# Patient Record
Sex: MALE | Race: ASIAN | URBAN - METROPOLITAN AREA
[De-identification: names, ages, dates, MRNs, and addresses within clinical notes are randomized per-mention and may not be internally consistent; named-entity substitution may affect disease eponyms.]

---

## 2018-06-21 ENCOUNTER — OFFICE VISIT (OUTPATIENT)
Dept: FAMILY MEDICINE CLINIC | Age: 73
End: 2018-06-21

## 2018-06-21 VITALS
RESPIRATION RATE: 18 BRPM | HEIGHT: 63 IN | OXYGEN SATURATION: 96 % | DIASTOLIC BLOOD PRESSURE: 82 MMHG | WEIGHT: 155 LBS | HEART RATE: 59 BPM | BODY MASS INDEX: 27.46 KG/M2 | SYSTOLIC BLOOD PRESSURE: 128 MMHG | TEMPERATURE: 98.1 F

## 2018-06-21 DIAGNOSIS — M19.90 ARTHRITIS: ICD-10-CM

## 2018-06-21 DIAGNOSIS — E78.00 HIGH CHOLESTEROL: ICD-10-CM

## 2018-06-21 DIAGNOSIS — K21.9 GASTROESOPHAGEAL REFLUX DISEASE WITHOUT ESOPHAGITIS: ICD-10-CM

## 2018-06-21 DIAGNOSIS — R56.9 SEIZURES (HCC): ICD-10-CM

## 2018-06-21 DIAGNOSIS — I10 ESSENTIAL HYPERTENSION: ICD-10-CM

## 2018-06-21 DIAGNOSIS — M10.00 IDIOPATHIC GOUT, UNSPECIFIED CHRONICITY, UNSPECIFIED SITE: Primary | ICD-10-CM

## 2018-06-21 RX ORDER — LOSARTAN POTASSIUM AND HYDROCHLOROTHIAZIDE 12.5; 1 MG/1; MG/1
1 TABLET ORAL DAILY
COMMUNITY
End: 2019-01-04 | Stop reason: SDUPTHER

## 2018-06-21 RX ORDER — SIMVASTATIN 40 MG/1
TABLET, FILM COATED ORAL
COMMUNITY
End: 2019-06-25 | Stop reason: SDUPTHER

## 2018-06-21 RX ORDER — ACETAMINOPHEN 500 MG
TABLET ORAL
COMMUNITY

## 2018-06-21 RX ORDER — LEVETIRACETAM 250 MG/1
250 TABLET ORAL 2 TIMES DAILY
COMMUNITY

## 2018-06-21 RX ORDER — NIFEDIPINE 60 MG/1
60 TABLET, EXTENDED RELEASE ORAL DAILY
COMMUNITY
End: 2019-01-04 | Stop reason: SDUPTHER

## 2018-06-21 RX ORDER — OMEPRAZOLE 20 MG/1
20 CAPSULE, DELAYED RELEASE ORAL DAILY
COMMUNITY
End: 2019-01-04 | Stop reason: SDUPTHER

## 2018-06-21 RX ORDER — ATENOLOL 100 MG/1
100 TABLET ORAL DAILY
COMMUNITY
End: 2019-06-25 | Stop reason: SDUPTHER

## 2018-06-21 RX ORDER — ALLOPURINOL 100 MG/1
TABLET ORAL DAILY
COMMUNITY
End: 2019-06-25 | Stop reason: SDUPTHER

## 2018-06-21 RX ORDER — NAPROXEN SODIUM 220 MG
220 TABLET ORAL 2 TIMES DAILY WITH MEALS
COMMUNITY
End: 2020-07-07

## 2018-06-21 NOTE — PROGRESS NOTES
Babar Em is a 74 y.o. male (: 1945) presenting to address:    Chief Complaint   Patient presents with   [address]    Hypertension    Joint Pain     bilateral knees       Vitals:    18 1330   BP: 128/82   Pulse: (!) 59   Resp: 18   Temp: 98.1 °F (36.7 °C)   TempSrc: Oral   SpO2: 96%   Weight: 155 lb (70.3 kg)   Height: 5' 3\" (1.6 m)   PainSc:   2   PainLoc: Knee       Hearing/Vision:   No exam data present    Learning Assessment:     Learning Assessment 2018   PRIMARY LEARNER Patient   PRIMARY LANGUAGE ENGLISH   LEARNER PREFERENCE PRIMARY VIDEOS     READING     LISTENING   ANSWERED BY patient   RELATIONSHIP SELF     Depression Screening:     PHQ over the last two weeks 2018   Little interest or pleasure in doing things Not at all   Feeling down, depressed or hopeless Not at all   Total Score PHQ 2 0     Fall Risk Assessment:     Fall Risk Assessment, last 12 mths 2018   Able to walk? Yes   Fall in past 12 months? No   Fall with injury? No     Abuse Screening:     Abuse Screening Questionnaire 2018   Do you ever feel afraid of your partner? N   Are you in a relationship with someone who physically or mentally threatens you? N   Is it safe for you to go home? Y     Coordination of Care Questionaire:   1. Have you been to the ER, urgent care clinic since your last visit? Hospitalized since your last visit? NO    2. Have you seen or consulted any other health care providers outside of the [address] [place] [address] since your last visit? Include any pap smears or colon screening.  NO

## 2018-06-21 NOTE — MR AVS SNAPSHOT
[address] [facility] [address] 
 
 
 [address] [facility] [address] [facility] [address] 
[phone] Patient: Compa Chavez MRN: [id number] QIV:[id number] Visit Information Date & Time Provider Department Dept. Phone Encounter #  
 6/21/2018  1:30 PM Jason Todd MD [employer] [phone] [id number] Follow-up Instructions Return if symptoms worsen or fail to improve. Your Appointments 8/22/2018  2:00 PM  
Follow Up with Jason Todd MD  
[employer] [address] [place] [address]) Appt Note: 2mth [address] [facility] [address] [facility] [address]  
[phone]  
  
   
 [address] [place] [id number] Upcoming Health Maintenance Date Due DTaP/Tdap/Td series (1 - Tdap) 2/20/1964 ZOSTER VACCINE AGE 60> 12/20/2002 GLAUCOMA SCREENING Q2Y 2/20/2008 Pneumococcal 65+ Low/Medium Risk (1 of 2 - PCV13) 2/20/2008 Influenza Age 5 to Adult 8/1/2018 Allergies as of 6/21/2018  Review Complete On: 6/21/2018 By: Chris Daly LPN No Known Allergies Current Immunizations  Never Reviewed No immunizations on file. Not reviewed this visit You Were Diagnosed With   
  
 Codes Comments Idiopathic gout, unspecified chronicity, unspecified site    -  Primary ICD-10-CM: M10.00 ICD-9-CM: 274.9 High cholesterol     ICD-10-CM: E78.00 ICD-9-CM: 272.0 Arthritis     ICD-10-CM: M19.90 ICD-9-CM: 716.90 Vitals BP Pulse Temp Resp Height(growth percentile) Weight(growth percentile) 128/82 (BP 1 Location: Right arm, BP Patient Position: Sitting) (!) 59 98.1 °F (36.7 °C) (Oral) 18 5' 3\" (1.6 m) 155 lb (70.3 kg) SpO2 BMI Smoking Status 96% 27.46 kg/m2 Former Smoker Vitals History BMI and BSA Data  Body Mass Index Body Surface Area  
 27.46 kg/m 2 1.77 m 2  
  
  
 Your Updated Medication List  
  
   
This list is accurate as of 6/21/18  2:14 PM.  Always use your most recent med list.  
  
  
  
  
 ALEVE 220 mg tablet Generic drug:  naproxen sodium Take 220 mg by mouth two (2) times daily (with meals). allopurinol 100 mg tablet Commonly known as:  Dorothy Colette Take  by mouth daily. levETIRAcetam 250 mg tablet Commonly known as:  KEPPRA Take  by mouth two (2) times a day. losartan-hydroCHLOROthiazide 100-12.5 mg per tablet Commonly known as:  HYZAAR Take 1 Tab by mouth daily. NIFEdipine ER 60 mg ER tablet Commonly known as:  ADALAT CC Take 60 mg by mouth daily. PriLOSEC 20 mg capsule Generic drug:  omeprazole Take 20 mg by mouth daily. TENORMIN 100 mg tablet Generic drug:  atenolol Take 100 mg by mouth daily. TYLENOL EXTRA STRENGTH 500 mg tablet Generic drug:  acetaminophen Take  by mouth every six (6) hours as needed for Pain. ZOCOR 40 mg tablet Generic drug:  simvastatin Take  by mouth nightly. Follow-up Instructions Return if symptoms worsen or fail to improve. To-Do List   
 06/21/2018 Lab:  LIPID PANEL   
  
 06/21/2018 Lab:  METABOLIC PANEL, COMPREHENSIVE   
  
 06/21/2018 Lab:  URIC ACID Patient Instructions Arthritis: Care Instructions Your Care Instructions Arthritis, also called osteoarthritis, is a breakdown of the cartilage that cushions your joints. When the cartilage wears down, your bones rub against each other. This causes pain and stiffness. Many people have some arthritis as they age. Arthritis most often affects the joints of the spine, hands, hips, knees, or feet. You can take simple measures to protect your joints, ease your pain, and help you stay active. Follow-up care is a key part of your treatment and safety.  Be sure to make and go to all appointments, and call your doctor if you are having problems. It's also a good idea to know your test results and keep a list of the medicines you take. How can you care for yourself at home? · Stay at a healthy weight. Being overweight puts extra strain on your joints. · Talk to your doctor or physical therapist about exercises that will help ease joint pain. ¨ Stretch. You may enjoy gentle forms of yoga to help keep your joints and muscles flexible. ¨ Walk instead of jog. Other types of exercise that are less stressful on the joints include riding a bicycle, swimming, wing chi, or water exercise. ¨ Lift weights. Strong muscles help reduce stress on your joints. Stronger thigh muscles, for example, take some of the stress off of the knees and hips. Learn the right way to lift weights so you do not make joint pain worse. · Take your medicines exactly as prescribed. Call your doctor if you think you are having a problem with your medicine. · Take pain medicines exactly as directed. ¨ If the doctor gave you a prescription medicine for pain, take it as prescribed. ¨ If you are not taking a prescription pain medicine, ask your doctor if you can take an over-the-counter medicine. · Use a cane, crutch, walker, or another device if you need help to get around. These can help rest your joints. You also can use other things to make life easier, such as a higher toilet seat and padded handles on kitchen utensils. · Do not sit in low chairs, which can make it hard to get up. · Put heat or cold on your sore joints as needed. Use whichever helps you most. You also can take turns with hot and cold packs. ¨ Apply heat 2 or 3 times a day for 20 to 30 minutes-using a heating pad, hot shower, or hot pack-to relieve pain and stiffness. ¨ Put ice or a cold pack on your sore joint for 10 to 20 minutes at a time. Put a thin cloth between the ice and your skin. When should you call for help? Call your doctor now or seek immediate medical care if: ? · You have sudden swelling, warmth, or pain in any joint. ? · You have joint pain and a fever or rash. ? · You have such bad pain that you cannot use a joint. ? Watch closely for changes in your health, and be sure to contact your doctor if: 
? · You have mild joint symptoms that continue even with more than 6 weeks of care at home. ? · You have stomach pain or other problems with your medicine. Where can you learn more? Go to http://sandoval-molly.info/. Enter H351 in the search box to learn more about \"Arthritis: Care Instructions. \" Current as of: October 31, 2016 Content Version: 11.4 © 4089-7183 Patient Feed. Care instructions adapted under license by [employer] (which disclaims liability or warranty for this information). If you have questions about a medical condition or this instruction, always ask your healthcare professional. [address] any warranty or liability for your use of this information. Introducing [facility]! Sharon Perez introduces myeasydocs patient portal. Now you can access parts of your medical record, email your doctor's office, and request medication refills online. 1. In your internet browser, go to [email or web address] 2. Click on the First Time User? Click Here link in the Sign In box. You will see the New Member Sign Up page. 3. Enter your myeasydocs Access Code exactly as it appears below. You will not need to use this code after youve completed the sign-up process. If you do not sign up before the expiration date, you must request a new code. · myeasydocs Access Code: [email or web address] Expires: 9/19/2018  2:14 PM 
 
4. Enter the last four digits of your Social Security Number (xxxx) and Date of Birth (mm/dd/yyyy) as indicated and click Submit. You will be taken to the next sign-up page. 5. Create a myeasydocs ID.  This will be your myeasydocs login ID and cannot be changed, so think of one that is secure and easy to remember. 6. Create a [employer] password. You can change your password at any time. 7. Enter your Password Reset Question and Answer. This can be used at a later time if you forget your password. 8. Enter your e-mail address. You will receive e-mail notification when new information is available in [address] 9. Click Sign Up. You can now view and download portions of your medical record. 10. Click the Download Summary menu link to download a portable copy of your medical information. If you have questions, please visit the Frequently Asked Questions section of the [employer] website. Remember, [employer] is NOT to be used for urgent needs. For medical emergencies, dial 911. Now available from your iPhone and Android! Please provide this summary of care documentation to your next provider. If you have any questions after today's visit, please call [phone].

## 2018-06-21 NOTE — PATIENT INSTRUCTIONS
Arthritis: Care Instructions  Your Care Instructions  Arthritis, also called osteoarthritis, is a breakdown of the cartilage that cushions your joints. When the cartilage wears down, your bones rub against each other. This causes pain and stiffness. Many people have some arthritis as they age. Arthritis most often affects the joints of the spine, hands, hips, knees, or feet. You can take simple measures to protect your joints, ease your pain, and help you stay active. Follow-up care is a key part of your treatment and safety. Be sure to make and go to all appointments, and call your doctor if you are having problems. It's also a good idea to know your test results and keep a list of the medicines you take. How can you care for yourself at home? · Stay at a healthy weight. Being overweight puts extra strain on your joints. · Talk to your doctor or physical therapist about exercises that will help ease joint pain. ¨ Stretch. You may enjoy gentle forms of yoga to help keep your joints and muscles flexible. ¨ Walk instead of jog. Other types of exercise that are less stressful on the joints include riding a bicycle, swimming, wing chi, or water exercise. ¨ Lift weights. Strong muscles help reduce stress on your joints. Stronger thigh muscles, for example, take some of the stress off of the knees and hips. Learn the right way to lift weights so you do not make joint pain worse. · Take your medicines exactly as prescribed. Call your doctor if you think you are having a problem with your medicine. · Take pain medicines exactly as directed. ¨ If the doctor gave you a prescription medicine for pain, take it as prescribed. ¨ If you are not taking a prescription pain medicine, ask your doctor if you can take an over-the-counter medicine. · Use a cane, crutch, walker, or another device if you need help to get around. These can help rest your joints.  You also can use other things to make life easier, such as a higher toilet seat and padded handles on kitchen utensils. · Do not sit in low chairs, which can make it hard to get up. · Put heat or cold on your sore joints as needed. Use whichever helps you most. You also can take turns with hot and cold packs. ¨ Apply heat 2 or 3 times a day for 20 to 30 minutes-using a heating pad, hot shower, or hot pack-to relieve pain and stiffness. ¨ Put ice or a cold pack on your sore joint for 10 to 20 minutes at a time. Put a thin cloth between the ice and your skin. When should you call for help? Call your doctor now or seek immediate medical care if:  ? · You have sudden swelling, warmth, or pain in any joint. ? · You have joint pain and a fever or rash. ? · You have such bad pain that you cannot use a joint. ? Watch closely for changes in your health, and be sure to contact your doctor if:  ? · You have mild joint symptoms that continue even with more than 6 weeks of care at home. ? · You have stomach pain or other problems with your medicine. Where can you learn more? Go to http://sandoval-molly.info/. Enter P558 in the search box to learn more about \"Arthritis: Care Instructions. \"  Current as of: October 31, 2016  Content Version: 11.4  © 7985-2031 [employer]. Care instructions adapted under license by [employer] (which disclaims liability or warranty for this information). If you have questions about a medical condition or this instruction, always ask your healthcare professional. [place] [address] any warranty or liability for your use of this information.

## 2018-06-22 NOTE — PROGRESS NOTES
Mamie Najera is a 74 y.o.  male and presents with     Chief Complaint   Patient presents with    Establish Care    Hypertension    Joint Pain     bilateral knees     Subjective: Additional Concerns: none    Current Outpatient Prescriptions   Medication Sig Dispense Refill    levETIRAcetam (KEPPRA) 250 mg tablet Take  by mouth two (2) times a day.  atenolol (TENORMIN) 100 mg tablet Take 100 mg by mouth daily.  losartan-hydroCHLOROthiazide (HYZAAR) 100-12.5 mg per tablet Take 1 Tab by mouth daily.  NIFEdipine ER (ADALAT CC) 60 mg ER tablet Take 60 mg by mouth daily.  allopurinol (ZYLOPRIM) 100 mg tablet Take  by mouth daily.  simvastatin (ZOCOR) 40 mg tablet Take  by mouth nightly.  omeprazole (PRILOSEC) 20 mg capsule Take 20 mg by mouth daily.  naproxen sodium (ALEVE) 220 mg tablet Take 220 mg by mouth two (2) times daily (with meals).  acetaminophen (TYLENOL EXTRA STRENGTH) 500 mg tablet Take  by mouth every six (6) hours as needed for Pain. No Known Allergies  Past Medical History:   Diagnosis Date    Arthritis     Cancer ([facility] [address]     rectal    GERD (gastroesophageal reflux disease)     Hypercholesterolemia     Hypertension      History reviewed. No pertinent surgical history.   Family History   Problem Relation Age of Onset    No Known Problems Mother     No Known Problems Father      Social History   Substance Use Topics    Smoking status: Former Smoker    Smokeless tobacco: Never Used    Alcohol use No     ROS     General: negative for - chills, fatigue, fever, weight change  Psych: negative for - anxiety, depression, irritability or mood swings  ENT: negative for - headaches, hearing change, nasal congestion, oral lesions, sneezing or sore throat  Heme/ Lymph: negative for - bleeding problems, bruising, pallor or swollen lymph nodes  Endo: negative for - hot flashes, polydipsia/polyuria or temperature intolerance  Resp: negative for - cough, shortness of breath or wheezing  CV: negative for - chest pain, edema or palpitations  GI: negative for - abdominal pain, change in bowel habits, constipation, diarrhea or nausea/vomiting  : negative for - dysuria, hematuria, incontinence, pelvic pain or vulvar/vaginal symptoms  MSK: negative for - joint pain, joint swelling or muscle pain  Neuro: negative for - confusion, headaches, seizures or weakness  Derm: negative for - dry skin, hair changes, rash or skin lesion changes    Objective:  Vitals:    06/21/18 1330   BP: 128/82   Pulse: (!) 59   Resp: 18   Temp: 98.1 °F (36.7 °C)   TempSrc: Oral   SpO2: 96%   Weight: 155 lb (70.3 kg)   Height: 5' 3\" (1.6 m)   PainSc:   2   PainLoc: Knee     PE    Alert, well appearing, and in no distress, oriented to person, place, and time and overweight  General appearance - alert, well appearing, and in no distress, oriented to person, place, and time and overweight  Mental status - alert, oriented to person, place, and time, normal mood, behavior, speech, dress, motor activity, and thought processes  Chest - clear to auscultation, no wheezes, rales or rhonchi, symmetric air entry  Heart - normal rate, regular rhythm, normal S1, S2, no murmurs, rubs, clicks or gallops  Extremities - peripheral pulses normal, no pedal edema, no clubbing or cyanosis    LABS   No results found for any previous visit. TESTS  No results found for this or any previous visit. Assessment/Plan:      1. Idiopathic gout, unspecified chronicity, unspecified site  - allopurinol (ZYLOPRIM) 100 mg tablet; Take  by mouth daily.  - URIC ACID; Future    2. High cholesterol  - simvastatin (ZOCOR) 40 mg tablet; Take  by mouth nightly. - LIPID PANEL; Future  - METABOLIC PANEL, COMPREHENSIVE; Future    3. Arthritis  - naproxen sodium (ALEVE) 220 mg tablet; Take 220 mg by mouth two (2) times daily (with meals). - acetaminophen (TYLENOL EXTRA STRENGTH) 500 mg tablet;  Take  by mouth every six (6) hours as needed for Pain. 4. Seizures (HCC)  - levETIRAcetam (KEPPRA) 250 mg tablet; Take  by mouth two (2) times a day. 5. Essential hypertension  - atenolol (TENORMIN) 100 mg tablet; Take 100 mg by mouth daily. - losartan-hydroCHLOROthiazide (HYZAAR) 100-12.5 mg per tablet; Take 1 Tab by mouth daily.  - NIFEdipine ER (ADALAT CC) 60 mg ER tablet; Take 60 mg by mouth daily. 6. Gastroesophageal reflux disease without esophagitis  - omeprazole (PRILOSEC) 20 mg capsule; Take 20 mg by mouth daily. Lab review: orders written for new lab studies as appropriate; see orders. I have discussed the diagnosis with the patient and the intended plan as seen in the above orders. The patient has received an after-visit summary and questions were answered concerning future plans. I have discussed medication side effects and warnings with the patient as well. I have reviewed the plan of care with the patient, accepted their input and they are in agreement with the treatment goals. F/U in 3 months routine.      Pete Chan MD

## 2018-06-22 NOTE — PROGRESS NOTES
Nani Lobo is a 74 y.o.  male and presents with F/U for gout, high chol, HTN, GERD, seizures, arthritis bilateral knees. Chief Complaint   Patient presents with    Establish Care    Hypertension    Joint Pain     bilateral knees     Subjective: Additional Concerns: none     Current Outpatient Prescriptions   Medication Sig Dispense Refill    levETIRAcetam (KEPPRA) 250 mg tablet Take  by mouth two (2) times a day.  atenolol (TENORMIN) 100 mg tablet Take 100 mg by mouth daily.  losartan-hydroCHLOROthiazide (HYZAAR) 100-12.5 mg per tablet Take 1 Tab by mouth daily.  NIFEdipine ER (ADALAT CC) 60 mg ER tablet Take 60 mg by mouth daily.  allopurinol (ZYLOPRIM) 100 mg tablet Take  by mouth daily.  simvastatin (ZOCOR) 40 mg tablet Take  by mouth nightly.  omeprazole (PRILOSEC) 20 mg capsule Take 20 mg by mouth daily.  naproxen sodium (ALEVE) 220 mg tablet Take 220 mg by mouth two (2) times daily (with meals).  acetaminophen (TYLENOL EXTRA STRENGTH) 500 mg tablet Take  by mouth every six (6) hours as needed for Pain. No Known Allergies  Past Medical History:   Diagnosis Date    Arthritis     Cancer ([facility] [address]     rectal    GERD (gastroesophageal reflux disease)     Hypercholesterolemia     Hypertension      History reviewed. No pertinent surgical history.   Family History   Problem Relation Age of Onset    No Known Problems Mother     No Known Problems Father      Social History   Substance Use Topics    Smoking status: Former Smoker    Smokeless tobacco: Never Used    Alcohol use No     ROS     General: negative for - chills, fatigue, fever, weight change  Psych: negative for - anxiety, depression, irritability or mood swings  ENT: negative for - headaches, hearing change, nasal congestion, oral lesions, sneezing or sore throat  Heme/ Lymph: negative for - bleeding problems, bruising, pallor or swollen lymph nodes  Endo: negative for - hot flashes, polydipsia/polyuria or temperature intolerance  Resp: negative for - cough, shortness of breath or wheezing  CV: negative for - chest pain, edema or palpitations  GI: negative for - abdominal pain, change in bowel habits, constipation, diarrhea or nausea/vomiting  : negative for - dysuria, hematuria, incontinence, pelvic pain or vulvar/vaginal symptoms  MSK: positive for - joint pain, joint swelling or muscle pain  Neuro: negative for - confusion, headaches, seizures or weakness  Derm: negative for - dry skin, hair changes, rash or skin lesion changes    Objective:  Vitals:    06/21/18 1330   BP: 128/82   Pulse: (!) 59   Resp: 18   Temp: 98.1 °F (36.7 °C)   TempSrc: Oral   SpO2: 96%   Weight: 155 lb (70.3 kg)   Height: 5' 3\" (1.6 m)   PainSc:   2   PainLoc: Knee     PE    Alert, well appearing, and in no distress, oriented to person, place, and time and overweight  General appearance - alert, well appearing, and in no distress, oriented to person, place, and time and overweight  Mental status - alert, oriented to person, place, and time, normal mood, behavior, speech, dress, motor activity, and thought processes  Chest - clear to auscultation, no wheezes, rales or rhonchi, symmetric air entry  Heart - normal rate, regular rhythm, normal S1, S2, no murmurs, rubs, clicks or gallops  Extremities - peripheral pulses normal, no pedal edema, no clubbing or cyanosis    LABS   No results found for any previous visit. TESTS  No results found for this or any previous visit. Assessment/Plan:      1. Idiopathic gout, unspecified chronicity, unspecified site  - allopurinol (ZYLOPRIM) 100 mg tablet; Take  by mouth daily.  - URIC ACID; Future    2. High cholesterol  - simvastatin (ZOCOR) 40 mg tablet; Take  by mouth nightly. - LIPID PANEL; Future  - METABOLIC PANEL, COMPREHENSIVE; Future    3. Arthritis  - naproxen sodium (ALEVE) 220 mg tablet; Take 220 mg by mouth two (2) times daily (with meals).   - acetaminophen (TYLENOL EXTRA STRENGTH) 500 mg tablet; Take  by mouth every six (6) hours as needed for Pain. 4. Seizures (HCC)  - levETIRAcetam (KEPPRA) 250 mg tablet; Take  by mouth two (2) times a day. 5. Essential hypertension  - atenolol (TENORMIN) 100 mg tablet; Take 100 mg by mouth daily. - losartan-hydroCHLOROthiazide (HYZAAR) 100-12.5 mg per tablet; Take 1 Tab by mouth daily.  - NIFEdipine ER (ADALAT CC) 60 mg ER tablet; Take 60 mg by mouth daily. 6. Gastroesophageal reflux disease without esophagitis  - omeprazole (PRILOSEC) 20 mg capsule; Take 20 mg by mouth daily. Lab review: orders written for new lab studies as appropriate; see orders. I have discussed the diagnosis with the patient and the intended plan as seen in the above orders. The patient has received an after-visit summary and questions were answered concerning future plans. I have discussed medication side effects and warnings with the patient as well. I have reviewed the plan of care with the patient, accepted their input and they are in agreement with the treatment goals. F/U as needed.      Vannessa Lentz MD

## 2018-08-22 ENCOUNTER — OFFICE VISIT (OUTPATIENT)
Dept: FAMILY MEDICINE CLINIC | Age: 73
End: 2018-08-22

## 2018-08-22 ENCOUNTER — HOSPITAL ENCOUNTER (OUTPATIENT)
Dept: LAB | Age: 73
Discharge: HOME | End: 2018-08-22
Payer: MEDICARE

## 2018-08-22 VITALS
BODY MASS INDEX: 27.11 KG/M2 | RESPIRATION RATE: 18 BRPM | HEART RATE: 70 BPM | TEMPERATURE: 98.2 F | OXYGEN SATURATION: 98 % | SYSTOLIC BLOOD PRESSURE: 110 MMHG | DIASTOLIC BLOOD PRESSURE: 81 MMHG | WEIGHT: 153 LBS | HEIGHT: 63 IN

## 2018-08-22 DIAGNOSIS — M10.00 IDIOPATHIC GOUT, UNSPECIFIED CHRONICITY, UNSPECIFIED SITE: ICD-10-CM

## 2018-08-22 DIAGNOSIS — E78.00 HIGH CHOLESTEROL: ICD-10-CM

## 2018-08-22 DIAGNOSIS — Z00.00 MEDICARE ANNUAL WELLNESS VISIT, INITIAL: ICD-10-CM

## 2018-08-22 DIAGNOSIS — H54.7 DECREASED VISUAL ACUITY: Primary | ICD-10-CM

## 2018-08-22 LAB
ALBUMIN SERPL-MCNC: 4 G/DL (ref 3.4–5)
ALBUMIN/GLOB SERPL: 1.1 {RATIO} (ref 0.8–1.7)
ALP SERPL-CCNC: 98 U/L (ref 45–117)
ALT SERPL-CCNC: 22 U/L (ref 16–61)
ANION GAP SERPL CALC-SCNC: 8 MMOL/L (ref 3–18)
AST SERPL-CCNC: 17 U/L (ref 15–37)
BILIRUB SERPL-MCNC: 0.5 MG/DL (ref 0.2–1)
BUN SERPL-MCNC: 28 MG/DL (ref 7–18)
BUN/CREAT SERPL: 16 (ref 12–20)
CALCIUM SERPL-MCNC: 8.8 MG/DL (ref 8.5–10.1)
CHLORIDE SERPL-SCNC: 104 MMOL/L (ref 100–108)
CHOLEST SERPL-MCNC: 140 MG/DL
CO2 SERPL-SCNC: 27 MMOL/L (ref 21–32)
CREAT SERPL-MCNC: 1.78 MG/DL (ref 0.6–1.3)
GLOBULIN SER CALC-MCNC: 3.6 G/DL (ref 2–4)
GLUCOSE SERPL-MCNC: 98 MG/DL (ref 74–99)
HDLC SERPL-MCNC: 35 MG/DL (ref 40–60)
HDLC SERPL: 4 {RATIO} (ref 0–5)
LDLC SERPL CALC-MCNC: 46.6 MG/DL (ref 0–100)
LIPID PROFILE,FLP: ABNORMAL
POTASSIUM SERPL-SCNC: 4.3 MMOL/L (ref 3.5–5.5)
PROT SERPL-MCNC: 7.6 G/DL (ref 6.4–8.2)
SODIUM SERPL-SCNC: 139 MMOL/L (ref 136–145)
TRIGL SERPL-MCNC: 292 MG/DL (ref ?–150)
URATE SERPL-MCNC: 7.1 MG/DL (ref 2.6–7.2)
VLDLC SERPL CALC-MCNC: 58.4 MG/DL

## 2018-08-22 PROCEDURE — 84550 ASSAY OF BLOOD/URIC ACID: CPT | Performed by: FAMILY MEDICINE

## 2018-08-22 PROCEDURE — 36415 COLL VENOUS BLD VENIPUNCTURE: CPT | Performed by: FAMILY MEDICINE

## 2018-08-22 PROCEDURE — 80061 LIPID PANEL: CPT | Performed by: FAMILY MEDICINE

## 2018-08-22 PROCEDURE — 80053 COMPREHEN METABOLIC PANEL: CPT | Performed by: FAMILY MEDICINE

## 2018-08-22 NOTE — PROGRESS NOTES
(AWV) The Initial Medicare Annual Wellness Exam PROGRESS NOTE    This is an Initial Medicare Annual Wellness Exam (AWV) (Performed 12 months after IPPE or effective date of Medicare Part B enrollment, Once in a lifetime)    I have reviewed the patient's medical history in detail and updated the computerized patient record. Yecenia Glaser is a 74 y.o.  male and presents for an annual wellness exam.     ROS     All other systems reviewed and are negative. History     Past Medical History:   Diagnosis Date    Arthritis     Cancer ([address]     rectal    GERD (gastroesophageal reflux disease)     Hypercholesterolemia     Hypertension       No past surgical history on file. Current Outpatient Prescriptions   Medication Sig Dispense Refill    levETIRAcetam (KEPPRA) 250 mg tablet Take  by mouth two (2) times a day.  atenolol (TENORMIN) 100 mg tablet Take 100 mg by mouth daily.  losartan-hydroCHLOROthiazide (HYZAAR) 100-12.5 mg per tablet Take 1 Tab by mouth daily.  NIFEdipine ER (ADALAT CC) 60 mg ER tablet Take 60 mg by mouth daily.  allopurinol (ZYLOPRIM) 100 mg tablet Take  by mouth daily.  simvastatin (ZOCOR) 40 mg tablet Take  by mouth nightly.  omeprazole (PRILOSEC) 20 mg capsule Take 20 mg by mouth daily.  acetaminophen (TYLENOL EXTRA STRENGTH) 500 mg tablet Take  by mouth every six (6) hours as needed for Pain.  naproxen sodium (ALEVE) 220 mg tablet Take 220 mg by mouth two (2) times daily (with meals).        No Known Allergies  Family History   Problem Relation Age of Onset    No Known Problems Mother     No Known Problems Father      Social History   Substance Use Topics    Smoking status: Former Smoker    Smokeless tobacco: Never Used    Alcohol use No     Health Maintenance History  Immunizations reviewed, dtap 5-6 years , pneumovax deferred, flu yearly , zoster: deferred  Colonoscopy: deferred ,   Chest CT: stopped 1984  Eye exam:   Mammo: n/a Dexascan: n/a     Depression Risk Factor Screening:      Patient Health Questionnaire (PHQ-2)   Over the last 2 weeks, how often have you been bothered by any of the following problems? · Little interest or pleasure in doing things? · Not at all. [0]  · Feeling down, depressed, or hopeless? · Not at all. [0]    Total Score: 0/6  PHQ-2 Assessment Scoring:   A score of 2 or more requires further screening with the PHQ-9    Alcohol Risk Factor Screening:     Women: On any occasion during the past 3 months, have you had more than 3 drinks containing alcohol? Do you average more than 7 drinks per week? N/a   Men: On any occasion during the past 3 months, have you had more than 4 drinks containing alcohol? Do you average more than 14 drinks per week? n/a    Functional Ability and Level of Safety:     Hearing Loss    Hearing is good. Activities of Daily Living   Self-care. Requires assistance with: no ADLs    Fall Risk   No fall risk factors    Abuse Screen   Patient is not abused    Examination   Physical Examination  Vitals:    08/22/18 1324   BP: 110/81   Pulse: 70   Resp: 18   Temp: 98.2 °F (36.8 °C)   TempSrc: Oral   SpO2: 98%   Weight: 153 lb (69.4 kg)   Height: 5' 3\" (1.6 m)   PainSc:   0 - No pain      Body mass index is 27.1 kg/(m^2).      Evaluation of Cognitive Function:  Mood/affect:normal  Appearance: well groomed   Family member/caregiver input: spouse    alert, well appearing, and in no distress, oriented to person, place, and time and overweight    No care team member to display    End-of-life planning  Advanced Directive in the case than an injury or illness causes the patient to be unable to make health care decisions    Health Care Directive or Living Will: no    Advice/Referrals/Counselling/Plan:   Education and counseling provided:  Are appropriate based on today's review and evaluation  Include in education list (weight loss, physical activity, smoking cessation, fall prevention, and nutrition)  current treatment plan is effective, no change in therapy. Brief written plan, checklist    I have discussed the diagnosis with the patient and the intended plan as seen in the above orders. The patient has received an after-visit summary and questions were answered concerning future plans. I have discussed medication side effects and warnings with the patient as well. I have reviewed the plan of care with the patient, accepted their input and they are in agreement with the treatment goals. _________________________________________________________    Problem Assessment    for treatment of   Chief Complaint   Patient presents with    Annual Wellness Visit       SUBJECTIVE    Additional Concerns: none     LABS   None    TESTS  none    Assessment/Plan:      1. Initial medicare wellness - as above.      2. Visual acuity decrease - referral to ophthalmology    Lab review: no lab studies available for review at time of visit

## 2018-08-23 NOTE — PATIENT INSTRUCTIONS

## 2018-08-26 NOTE — PROGRESS NOTES
Pls infrom patient his trig and Cr also similarly high like hie wife's. Pls inform both in tagalog of plan to take fish oil   For high trig OTC and recheck renal function in one month.

## 2018-09-04 ENCOUNTER — TELEPHONE (OUTPATIENT)
Dept: FAMILY MEDICINE CLINIC | Age: 73
End: 2018-09-04

## 2018-09-04 NOTE — TELEPHONE ENCOUNTER
----- Message from Mary Aguilera MD sent at 8/26/2018  5:33 PM EDT -----  Pls infrom patient his trig and Cr also similarly high like hie wife's. Pls inform both in tagalog of plan to take fish oil   For high trig OTC and recheck renal function in one month.

## 2018-11-23 ENCOUNTER — TELEPHONE (OUTPATIENT)
Dept: FAMILY MEDICINE CLINIC | Age: 73
End: 2018-11-23

## 2018-11-23 NOTE — TELEPHONE ENCOUNTER
Closing Referral.      Patient contacted : YES  Patient cancelled : NO  Patient No Showed more than twice : NO  Patient refused to reschedule :  YES    Patient has been contacted via phone and written correspondence to schedule an appointment. Patient verbalized non-compliance for scheduling with the specialist. I informed her the referral is still good for one year, if she chooses to follow through with the specialist visit. Patient acknowledged understanding. Closing Encounter.

## 2019-01-04 ENCOUNTER — OFFICE VISIT (OUTPATIENT)
Dept: FAMILY MEDICINE CLINIC | Age: 74
End: 2019-01-04

## 2019-01-04 VITALS
SYSTOLIC BLOOD PRESSURE: 129 MMHG | RESPIRATION RATE: 17 BRPM | OXYGEN SATURATION: 97 % | HEIGHT: 63 IN | BODY MASS INDEX: 27.29 KG/M2 | DIASTOLIC BLOOD PRESSURE: 83 MMHG | HEART RATE: 64 BPM | WEIGHT: 154 LBS | TEMPERATURE: 97.9 F

## 2019-01-04 DIAGNOSIS — R56.9 SEIZURE (HCC): Primary | ICD-10-CM

## 2019-01-04 DIAGNOSIS — M10.9 GOUT, ARTHRITIS: ICD-10-CM

## 2019-01-04 DIAGNOSIS — I10 ESSENTIAL HYPERTENSION: ICD-10-CM

## 2019-01-04 DIAGNOSIS — K21.9 GASTROESOPHAGEAL REFLUX DISEASE WITHOUT ESOPHAGITIS: ICD-10-CM

## 2019-01-04 DIAGNOSIS — E78.2 MIXED HYPERLIPIDEMIA: ICD-10-CM

## 2019-01-04 DIAGNOSIS — E55.9 VITAMIN D DEFICIENCY: ICD-10-CM

## 2019-01-04 DIAGNOSIS — N18.30 CKD (CHRONIC KIDNEY DISEASE) STAGE 3, GFR 30-59 ML/MIN (HCC): ICD-10-CM

## 2019-01-04 RX ORDER — OMEPRAZOLE 20 MG/1
20 CAPSULE, DELAYED RELEASE ORAL DAILY
Qty: 90 CAP | Refills: 1 | Status: SHIPPED | OUTPATIENT
Start: 2019-01-04 | End: 2019-06-25 | Stop reason: SDUPTHER

## 2019-01-04 RX ORDER — NIFEDIPINE 60 MG/1
60 TABLET, EXTENDED RELEASE ORAL DAILY
Qty: 90 TAB | Refills: 1 | Status: SHIPPED | OUTPATIENT
Start: 2019-01-04 | End: 2019-06-25 | Stop reason: SDUPTHER

## 2019-01-04 RX ORDER — LOSARTAN POTASSIUM AND HYDROCHLOROTHIAZIDE 12.5; 1 MG/1; MG/1
1 TABLET ORAL DAILY
Qty: 90 TAB | Refills: 1 | Status: SHIPPED | OUTPATIENT
Start: 2019-01-04 | End: 2019-06-25 | Stop reason: SDUPTHER

## 2019-01-04 NOTE — PROGRESS NOTES
Wilfredo Nunez 42     Chief Complaint   Patient presents with    Hypertension     Vitals:    01/04/19 1416   BP: 129/83   Pulse: 64   Resp: 17   Temp: 97.9 °F (36.6 °C)   TempSrc: Oral   SpO2: 97%   Weight: 154 lb (69.9 kg)   Height: 5' 3\" (1.6 m)   PainSc:   0 - No pain         HPI: Is here to establish care since his PCP has left the clinic, he has no acute complaints today. He has a history of hypertension which is well controlled under current medication    Has history following up with oncologist for colorectal cancer, and now the patient is cancer free and he follow-up every year. Blood work with oncologist we will obtain copy to follow-up on his renal function  Past Medical History:   Diagnosis Date    Arthritis     Cancer ([address]     rectal    GERD (gastroesophageal reflux disease)     Hypercholesterolemia     Hypertension      No past surgical history on file. Social History     Tobacco Use    Smoking status: Former Smoker    Smokeless tobacco: Never Used   Substance Use Topics    Alcohol use: No       Family History   Problem Relation Age of Onset    No Known Problems Mother     No Known Problems Father        Review of Systems   Constitutional: Negative for chills, fever, malaise/fatigue and weight loss. HENT: Negative for congestion, ear discharge, ear pain, hearing loss, nosebleeds and sinus pain. Eyes: Negative for blurred vision, double vision and discharge. Respiratory: Positive for cough. Negative for hemoptysis, sputum production, shortness of breath, wheezing and stridor. Cardiovascular: Negative for chest pain, palpitations, claudication and leg swelling. Gastrointestinal: Negative for abdominal pain, blood in stool, constipation, diarrhea, melena, nausea and vomiting. Genitourinary: Negative for dysuria, flank pain, frequency, hematuria and urgency. Musculoskeletal: Positive for joint pain. Negative for falls and myalgias. Skin: Negative for itching and rash. Neurological: Negative for dizziness, tingling, sensory change, speech change, focal weakness, seizures, loss of consciousness, weakness and headaches. Psychiatric/Behavioral: Negative for depression, hallucinations, substance abuse and suicidal ideas. The patient is not nervous/anxious and does not have insomnia. Physical Exam   Constitutional: He is oriented to person, place, and time. He appears well-developed and well-nourished. No distress. HENT:   Head: Normocephalic and atraumatic. Mouth/Throat: No oropharyngeal exudate. Eyes: Conjunctivae are normal. Pupils are equal, round, and reactive to light. Right eye exhibits no discharge. Left eye exhibits no discharge. No scleral icterus. Neck: Normal range of motion. Neck supple. No thyromegaly present. Cardiovascular: Normal rate, regular rhythm and normal heart sounds. Pulmonary/Chest: Effort normal and breath sounds normal. No respiratory distress. He has no rales. Abdominal: Soft. He exhibits no distension and no mass. There is no tenderness. There is no rebound. Musculoskeletal: Normal range of motion. He exhibits no edema, tenderness or deformity. Lymphadenopathy:     He has no cervical adenopathy. Neurological: He is alert and oriented to person, place, and time. No cranial nerve deficit. Coordination normal.   Skin: Skin is warm and dry. No rash noted. He is not diaphoretic. No erythema. Psychiatric: He has a normal mood and affect. Judgment and thought content normal.   Nursing note and vitals reviewed. Assessment and plan     Plan of care has been discussed with the patient, he agrees to the plan and verbalized understanding. All his questions were answered  More than 50% of the time spent in this visit was counseling the patient about  illness and treatment options         1. Gastroesophageal reflux disease without esophagitis  Stable  - omeprazole (PRILOSEC) 20 mg capsule; Take 1 Cap by mouth daily.   Dispense: 90 Cap; Refill: 1  - METABOLIC PANEL, COMPREHENSIVE; Future    2. Essential hypertension  Controlled  - losartan-hydroCHLOROthiazide (HYZAAR) 100-12.5 mg per tablet; Take 1 Tab by mouth daily. Dispense: 90 Tab; Refill: 1  - NIFEdipine ER (ADALAT CC) 60 mg ER tablet; Take 1 Tab by mouth daily. Dispense: 90 Tab; Refill: 1  - METABOLIC PANEL, COMPREHENSIVE; Future    3. Seizure [facility])  He has been seizure-free for many years    4. Mixed hyperlipidemia    - METABOLIC PANEL, COMPREHENSIVE; Future    5. Gout, arthritis  No recent attacks of gout    6. CKD (chronic kidney disease) stage 3, GFR 30-59 ml/min ([facility])    - METABOLIC PANEL, COMPREHENSIVE; Future    7. Vitamin D deficiency      Current Outpatient Medications   Medication Sig Dispense Refill    omeprazole (PRILOSEC) 20 mg capsule Take 1 Cap by mouth daily. 90 Cap 1    losartan-hydroCHLOROthiazide (HYZAAR) 100-12.5 mg per tablet Take 1 Tab by mouth daily. 90 Tab 1    NIFEdipine ER (ADALAT CC) 60 mg ER tablet Take 1 Tab by mouth daily. 90 Tab 1    levETIRAcetam (KEPPRA) 250 mg tablet Take  by mouth two (2) times a day.  atenolol (TENORMIN) 100 mg tablet Take 100 mg by mouth daily.  allopurinol (ZYLOPRIM) 100 mg tablet Take  by mouth daily.  simvastatin (ZOCOR) 40 mg tablet Take  by mouth nightly.  naproxen sodium (ALEVE) 220 mg tablet Take 220 mg by mouth two (2) times daily (with meals).  acetaminophen (TYLENOL EXTRA STRENGTH) 500 mg tablet Take  by mouth every six (6) hours as needed for Pain.          Patient Active Problem List    Diagnosis Date Noted    Gastroesophageal reflux disease without esophagitis 01/04/2019    Essential hypertension 01/04/2019    [facility] 01/04/2019    Mixed hyperlipidemia 01/04/2019    Gout, arthritis 01/04/2019    CKD (chronic kidney disease) stage 3, GFR 30-59 ml/min ([facility] 01/04/2019    Vitamin D deficiency 01/04/2019     Results for orders placed or performed during the hospital encounter of 08/22/18   LIPID PANEL   Result Value Ref Range    LIPID PROFILE          Cholesterol, total 140 <200 MG/DL    Triglyceride 292 (H) <150 MG/DL    HDL Cholesterol 35 (L) 40 - 60 MG/DL    LDL, calculated 46.6 0 - 100 MG/DL    VLDL, calculated 58.4 MG/DL    CHOL/HDL Ratio 4.0 0 - 5.0     METABOLIC PANEL, COMPREHENSIVE   Result Value Ref Range    Sodium 139 136 - 145 mmol/L    Potassium 4.3 3.5 - 5.5 mmol/L    Chloride 104 100 - 108 mmol/L    CO2 27 21 - 32 mmol/L    Anion gap 8 3.0 - 18 mmol/L    Glucose 98 74 - 99 mg/dL    BUN 28 (H) 7.0 - 18 MG/DL    Creatinine 1.78 (H) 0.6 - 1.3 MG/DL    BUN/Creatinine ratio 16 12 - 20      GFR est AA 45 (L) >60 ml/min/1.73m2    GFR est non-AA 37 (L) >60 ml/min/1.73m2    Calcium 8.8 8.5 - 10.1 MG/DL    Bilirubin, total 0.5 0.2 - 1.0 MG/DL    ALT (SGPT) 22 16 - 61 U/L    AST (SGOT) 17 15 - 37 U/L    Alk. phosphatase 98 45 - 117 U/L    Protein, total 7.6 6.4 - 8.2 g/dL    Albumin 4.0 3.4 - 5.0 g/dL    Globulin 3.6 2.0 - 4.0 g/dL    A-G Ratio 1.1 0.8 - 1.7     URIC ACID   Result Value Ref Range    Uric acid 7.1 2.6 - 7.2 MG/DL     No visits with results within 3 Month(s) from this visit. Latest known visit with results is:   Hospital Outpatient Visit on 08/22/2018   Component Date Value Ref Range Status    LIPID PROFILE 08/22/2018        Final    Cholesterol, total 08/22/2018 140  <200 MG/DL Final    Triglyceride 08/22/2018 292* <150 MG/DL Final    Comment: The drugs N-acetylcysteine (NAC) and  Metamiszole have been found to cause falsely  low results in this chemical assay. Please  be sure to submit blood samples obtained  BEFORE administration of either of these  drugs to assure correct results.       HDL Cholesterol 08/22/2018 35* 40 - 60 MG/DL Final    LDL, calculated 08/22/2018 46.6  0 - 100 MG/DL Final    VLDL, calculated 08/22/2018 58.4  MG/DL Final    CHOL/HDL Ratio 08/22/2018 4.0  0 - 5.0   Final    Sodium 08/22/2018 139  136 - 145 mmol/L Final    Potassium 08/22/2018 4.3  3.5 - 5.5 mmol/L Final    Chloride 08/22/2018 104  100 - 108 mmol/L Final    CO2 08/22/2018 27  21 - 32 mmol/L Final    Anion gap 08/22/2018 8  3.0 - 18 mmol/L Final    Glucose 08/22/2018 98  74 - 99 mg/dL Final    BUN 08/22/2018 28* 7.0 - 18 MG/DL Final    Creatinine 08/22/2018 1.78* 0.6 - 1.3 MG/DL Final    BUN/Creatinine ratio 08/22/2018 16  12 - 20   Final    GFR est AA 08/22/2018 45* >60 ml/min/1.73m2 Final    GFR est non-AA 08/22/2018 37* >60 ml/min/1.73m2 Final    Comment: (NOTE)  Estimated GFR is calculated using the Modification of Diet in Renal   Disease (MDRD) Study equation, reported for both  Americans   (GFRAA) and non- Americans (GFRNA), and normalized to 1.73m2   body surface area. The physician must decide which value applies to   the patient. The MDRD study equation should only be used in   individuals age 25 or older. It has not been validated for the   following: pregnant women, patients with serious comorbid conditions,   or on certain medications, or persons with extremes of body size,   muscle mass, or nutritional status.  Calcium 08/22/2018 8.8  8.5 - 10.1 MG/DL Final    Bilirubin, total 08/22/2018 0.5  0.2 - 1.0 MG/DL Final    ALT (SGPT) 08/22/2018 22  16 - 61 U/L Final    AST (SGOT) 08/22/2018 17  15 - 37 U/L Final    Alk. phosphatase 08/22/2018 98  45 - 117 U/L Final    Protein, total 08/22/2018 7.6  6.4 - 8.2 g/dL Final    Albumin 08/22/2018 4.0  3.4 - 5.0 g/dL Final    Globulin 08/22/2018 3.6  2.0 - 4.0 g/dL Final    A-G Ratio 08/22/2018 1.1  0.8 - 1.7   Final    Uric acid 08/22/2018 7.1  2.6 - 7.2 MG/DL Final    Comment: The drugs N-acetylcysteine (NAC) and  Metamiszole have been found to cause falsely  low results in this chemical assay. Please  be sure to submit blood samples obtained  BEFORE administration of either of these  drugs to assure correct results.             Follow-up Disposition:  Return in about 3 months (around 4/4/2019) for to get labs done 1 week  befor next visit .

## 2019-01-05 ENCOUNTER — TELEPHONE (OUTPATIENT)
Dept: FAMILY MEDICINE CLINIC | Age: 74
End: 2019-01-05

## 2019-01-10 ENCOUNTER — TELEPHONE (OUTPATIENT)
Dept: FAMILY MEDICINE CLINIC | Age: 74
End: 2019-01-10

## 2019-01-10 NOTE — TELEPHONE ENCOUNTER
Office notes from [facility]- Dr. Ca Newsome received via facsimile. Forwarded to provider for review.

## 2019-06-25 ENCOUNTER — OFFICE VISIT (OUTPATIENT)
Dept: FAMILY MEDICINE CLINIC | Age: 74
End: 2019-06-25

## 2019-06-25 VITALS
TEMPERATURE: 97.9 F | BODY MASS INDEX: 27.29 KG/M2 | HEART RATE: 62 BPM | OXYGEN SATURATION: 99 % | DIASTOLIC BLOOD PRESSURE: 84 MMHG | WEIGHT: 154 LBS | RESPIRATION RATE: 17 BRPM | HEIGHT: 63 IN | SYSTOLIC BLOOD PRESSURE: 139 MMHG

## 2019-06-25 DIAGNOSIS — Z23 ENCOUNTER FOR IMMUNIZATION: Primary | ICD-10-CM

## 2019-06-25 DIAGNOSIS — E78.00 HIGH CHOLESTEROL: ICD-10-CM

## 2019-06-25 DIAGNOSIS — R56.9 SEIZURES (HCC): ICD-10-CM

## 2019-06-25 DIAGNOSIS — I10 ESSENTIAL HYPERTENSION: ICD-10-CM

## 2019-06-25 DIAGNOSIS — K21.9 GASTROESOPHAGEAL REFLUX DISEASE WITHOUT ESOPHAGITIS: ICD-10-CM

## 2019-06-25 DIAGNOSIS — M10.00 IDIOPATHIC GOUT, UNSPECIFIED CHRONICITY, UNSPECIFIED SITE: ICD-10-CM

## 2019-06-25 RX ORDER — ATENOLOL 100 MG/1
100 TABLET ORAL DAILY
Qty: 90 TAB | Refills: 1 | Status: SHIPPED | OUTPATIENT
Start: 2019-06-25 | End: 2019-10-22 | Stop reason: SDUPTHER

## 2019-06-25 RX ORDER — ALLOPURINOL 100 MG/1
100 TABLET ORAL DAILY
Qty: 90 TAB | Refills: 1 | Status: SHIPPED | OUTPATIENT
Start: 2019-06-25 | End: 2019-10-22 | Stop reason: SDUPTHER

## 2019-06-25 RX ORDER — NIFEDIPINE 60 MG/1
60 TABLET, EXTENDED RELEASE ORAL DAILY
Qty: 90 TAB | Refills: 1 | Status: SHIPPED | OUTPATIENT
Start: 2019-06-25 | End: 2019-10-22 | Stop reason: SDUPTHER

## 2019-06-25 RX ORDER — LOSARTAN POTASSIUM AND HYDROCHLOROTHIAZIDE 12.5; 1 MG/1; MG/1
1 TABLET ORAL DAILY
Qty: 90 TAB | Refills: 1 | Status: SHIPPED | OUTPATIENT
Start: 2019-06-25 | End: 2019-10-22 | Stop reason: SDUPTHER

## 2019-06-25 RX ORDER — SIMVASTATIN 40 MG/1
40 TABLET, FILM COATED ORAL
Qty: 90 TAB | Refills: 1 | Status: SHIPPED | OUTPATIENT
Start: 2019-06-25 | End: 2019-10-22 | Stop reason: SDUPTHER

## 2019-06-25 RX ORDER — OMEPRAZOLE 20 MG/1
20 CAPSULE, DELAYED RELEASE ORAL DAILY
Qty: 90 CAP | Refills: 1 | Status: SHIPPED | OUTPATIENT
Start: 2019-06-25 | End: 2019-10-22 | Stop reason: SDUPTHER

## 2019-06-25 NOTE — PROGRESS NOTES
Leopold Push is a 66 y.o. male presents to office for htn    Medication list has been reviewed with Leopold Push and updated as of today's date     Health Maintenance items with a due date reviewed with patient:  Health Maintenance Due   Topic Date Due    DTaP/Tdap/Td series (1 - Tdap) 02/20/1964    Shingrix Vaccine Age 50> (1 of 2) 02/20/1993    GLAUCOMA SCREENING Q2Y  02/20/2008    Pneumococcal 65+ years (1 of 2 - PCV13) 02/20/2008

## 2019-06-25 NOTE — PATIENT INSTRUCTIONS
Vaccine Information Statement     Pneumococcal Conjugate Vaccine (PCV13): What You Need to Know    Many Vaccine Information Statements are available in [place] and other languages. See www.immunize.org/vis. Hojas de información Sobre Vacunas están disponibles en español y en muchos otros idiomas. Visite www.immunize.org/vis. 1. Why get vaccinated? Vaccination can protect both children and adults from pneumococcal disease. Pneumococcal disease is caused by bacteria that can spread from person to person through close contact. It can cause ear infections, and it can also lead to more serious infections of the:   Lungs (pneumonia),   Blood (bacteremia), and   Covering of the brain and spinal cord (meningitis). Pneumococcal pneumonia is most common among adults. Pneumococcal meningitis can cause deafness and brain damage, and it kills about 1 child in 10 who get it. Anyone can get pneumococcal disease, but children under 3years of age and adults 72 years and older, people with certain medical conditions, and cigarette smokers are at the highest risk. Before there was a vaccine, the [facility] saw:   more than 700 cases of meningitis,   about 13,000 blood infections,   about 5 million ear infections, and   about 200 deaths  in children under 5 each year from pneumococcal disease. Since vaccine became available, severe pneumococcal disease in these children has fallen by 88%. About 18,000 older adults die of pneumococcal disease each year in the [place]. Treatment of pneumococcal infections with penicillin and other drugs is not as effective as it used to be, because some strains of the disease have become resistant to these drugs. This makes prevention of the disease, through vaccination, even more important. 2. PCV13 vaccine    Pneumococcal conjugate vaccine (called PCV13) protects against 13 types of pneumococcal bacteria.       PCV13 is routinely given to children at 2, 4, 6, and 1515 months of age. It is also recommended for children and adults 3to 59years of age with certain health conditions, and for all adults 72years of age and older. Your doctor can give you details. 3. Some people should not get this vaccine    Anyone who has ever had a life-threatening allergic reaction to a dose of this vaccine, to an earlier pneumococcal vaccine called PCV7, or to any vaccine containing diphtheria toxoid (for example, DTaP), should not get PCV13. Anyone with a severe allergy to any component of PCV13 should not get the vaccine. Tell your doctor if the person being vaccinated has any severe allergies. If the person scheduled for vaccination is not feeling well, your healthcare provider might decide to reschedule the shot on another day. 4. Risks of a vaccine reaction    With any medicine, including vaccines, there is a chance of reactions. These are usually mild and go away on their own, but serious reactions are also possible. Problems reported following PCV13 varied by age and dose in the series. The most common problems reported among children were:    About half became drowsy after the shot, had a temporary loss of appetite, or had redness or tenderness where the shot was given.  About 1 out of 3 had swelling where the shot was given.  About 1 out of 3 had a mild fever, and about 1 in 20 had a fever over 102.2°F.   Up to about 8 out of 10 became fussy or irritable. Adults have reported pain, redness, and swelling where the shot was given; also mild fever, fatigue, headache, chills, or muscle pain. Javier Villanueva children who get PCV13 along with inactivated flu vaccine at the same time may be at increased risk for seizures caused by fever. Ask your doctor for more information. Problems that could happen after any vaccine:     People sometimes faint after a medical procedure, including vaccination.  Sitting or lying down for about 15 minutes can help prevent fainting, and injuries caused by a fall. Tell your doctor if you feel dizzy, or have vision changes or ringing in the ears.  Some older children and adults get severe pain in the shoulder and have difficulty moving the arm where a shot was given. This happens very rarely.  Any medication can cause a severe allergic reaction. Such reactions from a vaccine are very rare, estimated at about 1 in a million doses, and would happen within a few minutes to a few hours after the vaccination. As with any medicine, there is a very small chance of a vaccine causing a serious injury or death. The safety of vaccines is always being monitored. For more information, visit: www.cdc.gov/vaccinesafety/     5. What if there is a serious reaction? What should I look for?  Look for anything that concerns you, such as signs of a severe allergic reaction, very high fever, or unusual behavior. Signs of a severe allergic reaction can include hives, swelling of the face and throat, difficulty breathing, a fast heartbeat, dizziness, and weakness - usually within a few minutes to a few hours after the vaccination. What should I do?  If you think it is a severe allergic reaction or other emergency that cant wait, call 9-1-1 or get the person to the nearest hospital. Otherwise, call your doctor. Reactions should be reported to the Vaccine Adverse Event Reporting System (VAERS). Your doctor should file this report, or you can do it yourself through the VAERS web site at www.vaers. hhs.gov, or by calling 7-857.795.3590. VAERS does not give medical advice. 6. The National Vaccine Injury Compensation Program    The [facility] Vaccine Injury Compensation Program (VICP) is a federal program that was created to compensate people who may have been injured by certain vaccines.     Persons who believe they may have been injured by a vaccine can learn about the program and about filing a claim by calling 1-370.482.2896 or visiting the [facility] [address] website at [email or web address].gov/vaccinecompensation. There is a time limit to file a claim for compensation. 7. How can I learn more?  Ask your healthcare provider. He or she can give you the vaccine package insert or suggest other sources of information.  Call your local or state health department.  Contact the Centers for Disease Control and Prevention (CDC):  - Call 9-187.349.2906 (5-855-YET-INFO) or  - Visit CDCs website at www.cdc.gov/vaccines    Vaccine Information Statement   PCV13 Vaccine   11/5/2015   42 [address] [id number]    Department of Health and Human Services  Centers for Disease Control and Prevention    Office Use Only

## 2019-06-25 NOTE — PROGRESS NOTES
Prevnar 13 Immunization/s administered 6/25/2019 by Jennifer Santos LPN with guardian's consent. Patient tolerated procedure well. No reactions noted.

## 2019-06-25 NOTE — PROGRESS NOTES
Wilfredocynthia Nunez 42     Chief Complaint   Patient presents with    Hypertension     Vitals:    06/25/19 1520   BP: 139/84   Pulse: 62   Resp: 17   Temp: 97.9 °F (36.6 °C)   TempSrc: Oral   SpO2: 99%   Weight: 154 lb (69.9 kg)   Height: 5' 3\" (1.6 m)   PainSc:   0 - No pain         HPI: Patient is here for follow-up and to get lab work and medication refill he has no acute complaint no chest pain no shortness of breath no dizziness, he has noticed swelling in his ankles. But no shortness of breath no chest pain no dizziness, he was accompanied today with his wife. Past Medical History:   Diagnosis Date    Arthritis     Cancer ([address]     rectal    GERD (gastroesophageal reflux disease)     Hypercholesterolemia     Hypertension      History reviewed. No pertinent surgical history. Social History     Tobacco Use    Smoking status: Former Smoker    Smokeless tobacco: Never Used   Substance Use Topics    Alcohol use: No       Family History   Problem Relation Age of Onset    No Known Problems Mother     No Known Problems Father        Review of Systems   Constitutional: Negative for chills, fever, malaise/fatigue and weight loss. HENT: Negative for congestion, ear discharge, ear pain, hearing loss, nosebleeds, sinus pain and sore throat. Eyes: Negative for blurred vision, double vision and discharge. Respiratory: Negative for cough, hemoptysis, sputum production, shortness of breath and wheezing. Cardiovascular: Negative for chest pain, palpitations, claudication and leg swelling. Gastrointestinal: Negative for abdominal pain, constipation, diarrhea, nausea and vomiting. Genitourinary: Negative for dysuria, frequency, hematuria and urgency. Musculoskeletal: Positive for joint pain. Negative for back pain, falls, myalgias and neck pain. Skin: Negative for itching and rash.    Neurological: Negative for dizziness, tingling, sensory change, speech change, focal weakness, seizures, weakness and headaches. Psychiatric/Behavioral: Negative for depression and suicidal ideas. Physical Exam   Constitutional: He is oriented to person, place, and time. He appears well-developed and well-nourished. No distress. HENT:   Head: Normocephalic and atraumatic. Mouth/Throat: No oropharyngeal exudate. Eyes: Pupils are equal, round, and reactive to light. Conjunctivae are normal. Right eye exhibits no discharge. Left eye exhibits no discharge. No scleral icterus. Neck: Normal range of motion. Neck supple. No thyromegaly present. Cardiovascular: Normal rate, regular rhythm and normal heart sounds. Pulmonary/Chest: Effort normal and breath sounds normal. No respiratory distress. He has no rales. Abdominal: Soft. Bowel sounds are normal. He exhibits no distension and no mass. There is no tenderness. There is no rebound. Musculoskeletal: Normal range of motion. He exhibits edema. He exhibits no tenderness or deformity. Lymphadenopathy:     He has no cervical adenopathy. Neurological: He is alert and oriented to person, place, and time. No cranial nerve deficit. Coordination normal.   Skin: Skin is warm and dry. No rash noted. He is not diaphoretic. No erythema. Psychiatric: He has a normal mood and affect. Judgment and thought content normal.   Nursing note and vitals reviewed. Assessment and plan     Plan of care has been discussed with the patient, he agrees to the plan and verbalized understanding. All his questions were answered  More than 50% of the time spent in this visit was counseling the patient about  illness and treatment options         1. Seizures ([address]     Stable no seizure activities for many years    2. Gastroesophageal reflux disease without esophagitis   stable   - omeprazole (PRILOSEC) 20 mg capsule; Take 1 Cap by mouth daily. Dispense: 90 Cap; Refill: 1    3.  Essential hypertension   well controlled on current medications  - losartan-hydroCHLOROthiazide (HYZAAR) 100-12.5 mg per tablet; Take 1 Tab by mouth daily. Dispense: 90 Tab; Refill: 1  - NIFEdipine ER (ADALAT CC) 60 mg ER tablet; Take 1 Tab by mouth daily. Dispense: 90 Tab; Refill: 1  - atenolol (TENORMIN) 100 mg tablet; Take 1 Tab by mouth daily. Dispense: 90 Tab; Refill: 1  - METABOLIC PANEL, COMPREHENSIVE; Future  - CBC WITH AUTOMATED DIFF; Future    4. High cholesterol    - simvastatin (ZOCOR) 40 mg tablet; Take 1 Tab by mouth nightly. Dispense: 90 Tab; Refill: 1  - LIPID PANEL; Future  - CBC WITH AUTOMATED DIFF; Future    5. Idiopathic gout, unspecified chronicity, unspecified site  No recent major exacerbation of  - allopurinol (ZYLOPRIM) 100 mg tablet; Take 1 Tab by mouth daily. Dispense: 90 Tab; Refill: 1    6. Encounter for immunization    - PNEUMOCOCCAL CONJ VACCINE 13 VALENT IM    Current Outpatient Medications   Medication Sig Dispense Refill    omeprazole (PRILOSEC) 20 mg capsule Take 1 Cap by mouth daily. 90 Cap 1    losartan-hydroCHLOROthiazide (HYZAAR) 100-12.5 mg per tablet Take 1 Tab by mouth daily. 90 Tab 1    NIFEdipine ER (ADALAT CC) 60 mg ER tablet Take 1 Tab by mouth daily. 90 Tab 1    simvastatin (ZOCOR) 40 mg tablet Take 1 Tab by mouth nightly. 90 Tab 1    allopurinol (ZYLOPRIM) 100 mg tablet Take 1 Tab by mouth daily. 90 Tab 1    atenolol (TENORMIN) 100 mg tablet Take 1 Tab by mouth daily. 90 Tab 1    levETIRAcetam (KEPPRA) 250 mg tablet Take  by mouth two (2) times a day.  naproxen sodium (ALEVE) 220 mg tablet Take 220 mg by mouth two (2) times daily (with meals).  acetaminophen (TYLENOL EXTRA STRENGTH) 500 mg tablet Take  by mouth every six (6) hours as needed for Pain.          Patient Active Problem List    Diagnosis Date Noted    Gastroesophageal reflux disease without esophagitis 01/04/2019    Essential hypertension 01/04/2019    Seizure ([address] 01/04/2019    Mixed hyperlipidemia 01/04/2019    Gout, arthritis 01/04/2019    CKD (chronic kidney disease) stage 3, GFR 30-59 ml/min (HCC) 01/04/2019    Vitamin D deficiency 01/04/2019     Results for orders placed or performed during the hospital encounter of 08/22/18   LIPID PANEL   Result Value Ref Range    LIPID PROFILE          Cholesterol, total 140 <200 MG/DL    Triglyceride 292 (H) <150 MG/DL    HDL Cholesterol 35 (L) 40 - 60 MG/DL    LDL, calculated 46.6 0 - 100 MG/DL    VLDL, calculated 58.4 MG/DL    CHOL/HDL Ratio 4.0 0 - 5.0     METABOLIC PANEL, COMPREHENSIVE   Result Value Ref Range    Sodium 139 136 - 145 mmol/L    Potassium 4.3 3.5 - 5.5 mmol/L    Chloride 104 100 - 108 mmol/L    CO2 27 21 - 32 mmol/L    Anion gap 8 3.0 - 18 mmol/L    Glucose 98 74 - 99 mg/dL    BUN 28 (H) 7.0 - 18 MG/DL    Creatinine 1.78 (H) 0.6 - 1.3 MG/DL    BUN/Creatinine ratio 16 12 - 20      GFR est AA 45 (L) >60 ml/min/1.73m2    GFR est non-AA 37 (L) >60 ml/min/1.73m2    Calcium 8.8 8.5 - 10.1 MG/DL    Bilirubin, total 0.5 0.2 - 1.0 MG/DL    ALT (SGPT) 22 16 - 61 U/L    AST (SGOT) 17 15 - 37 U/L    Alk. phosphatase 98 45 - 117 U/L    Protein, total 7.6 6.4 - 8.2 g/dL    Albumin 4.0 3.4 - 5.0 g/dL    Globulin 3.6 2.0 - 4.0 g/dL    A-G Ratio 1.1 0.8 - 1.7     URIC ACID   Result Value Ref Range    Uric acid 7.1 2.6 - 7.2 MG/DL     No visits with results within 3 Month(s) from this visit. Latest known visit with results is:   Hospital Outpatient Visit on 08/22/2018   Component Date Value Ref Range Status    LIPID PROFILE 08/22/2018        Final    Cholesterol, total 08/22/2018 140  <200 MG/DL Final    Triglyceride 08/22/2018 292* <150 MG/DL Final    Comment: The drugs N-acetylcysteine (NAC) and  Metamiszole have been found to cause falsely  low results in this chemical assay. Please  be sure to submit blood samples obtained  BEFORE administration of either of these  drugs to assure correct results.       HDL Cholesterol 08/22/2018 35* 40 - 60 MG/DL Final    LDL, calculated 08/22/2018 46.6  0 - 100 MG/DL Final    VLDL, calculated 08/22/2018 58.4  MG/DL Final    CHOL/HDL Ratio 08/22/2018 4.0  0 - 5.0   Final    Sodium 08/22/2018 139  136 - 145 mmol/L Final    Potassium 08/22/2018 4.3  3.5 - 5.5 mmol/L Final    Chloride 08/22/2018 104  100 - 108 mmol/L Final    CO2 08/22/2018 27  21 - 32 mmol/L Final    Anion gap 08/22/2018 8  3.0 - 18 mmol/L Final    Glucose 08/22/2018 98  74 - 99 mg/dL Final    BUN 08/22/2018 28* 7.0 - 18 MG/DL Final    Creatinine 08/22/2018 1.78* 0.6 - 1.3 MG/DL Final    BUN/Creatinine ratio 08/22/2018 16  12 - 20   Final    GFR est AA 08/22/2018 45* >60 ml/min/1.73m2 Final    GFR est non-AA 08/22/2018 37* >60 ml/min/1.73m2 Final    Comment: (NOTE)  Estimated GFR is calculated using the Modification of Diet in Renal   Disease (MDRD) Study equation, reported for both  Americans   (GFRAA) and non- Americans (GFRNA), and normalized to 1.73m2   body surface area. The physician must decide which value applies to   the patient. The MDRD study equation should only be used in   individuals age 25 or older. It has not been validated for the   following: pregnant women, patients with serious comorbid conditions,   or on certain medications, or persons with extremes of body size,   muscle mass, or nutritional status.  Calcium 08/22/2018 8.8  8.5 - 10.1 MG/DL Final    Bilirubin, total 08/22/2018 0.5  0.2 - 1.0 MG/DL Final    ALT (SGPT) 08/22/2018 22  16 - 61 U/L Final    AST (SGOT) 08/22/2018 17  15 - 37 U/L Final    Alk. phosphatase 08/22/2018 98  45 - 117 U/L Final    Protein, total 08/22/2018 7.6  6.4 - 8.2 g/dL Final    Albumin 08/22/2018 4.0  3.4 - 5.0 g/dL Final    Globulin 08/22/2018 3.6  2.0 - 4.0 g/dL Final    A-G Ratio 08/22/2018 1.1  0.8 - 1.7   Final    Uric acid 08/22/2018 7.1  2.6 - 7.2 MG/DL Final    Comment: The drugs N-acetylcysteine (NAC) and  Metamiszole have been found to cause falsely  low results in this chemical assay.  Please  be sure to submit blood samples obtained  BEFORE administration of either of these  drugs to assure correct results. Follow-up and Dispositions    · Return in about 3 months (around 9/25/2019).

## 2019-06-27 ENCOUNTER — HOSPITAL ENCOUNTER (OUTPATIENT)
Dept: LAB | Age: 74
Discharge: HOME | End: 2019-06-27
Payer: MEDICARE

## 2019-06-27 DIAGNOSIS — E78.00 HIGH CHOLESTEROL: ICD-10-CM

## 2019-06-27 DIAGNOSIS — I10 ESSENTIAL HYPERTENSION: ICD-10-CM

## 2019-06-27 LAB
ALBUMIN SERPL-MCNC: 3.9 G/DL (ref 3.4–5)
ALBUMIN/GLOB SERPL: 1.2 {RATIO} (ref 0.8–1.7)
ALP SERPL-CCNC: 91 U/L (ref 45–117)
ALT SERPL-CCNC: 20 U/L (ref 16–61)
ANION GAP SERPL CALC-SCNC: 10 MMOL/L (ref 3–18)
AST SERPL-CCNC: 15 U/L (ref 15–37)
BASOPHILS # BLD: 0 K/UL (ref 0–0.1)
BASOPHILS NFR BLD: 0 % (ref 0–2)
BILIRUB SERPL-MCNC: 0.4 MG/DL (ref 0.2–1)
BUN SERPL-MCNC: 25 MG/DL (ref 7–18)
BUN/CREAT SERPL: 15 (ref 12–20)
CALCIUM SERPL-MCNC: 8.5 MG/DL (ref 8.5–10.1)
CHLORIDE SERPL-SCNC: 106 MMOL/L (ref 100–108)
CHOLEST SERPL-MCNC: 130 MG/DL
CO2 SERPL-SCNC: 24 MMOL/L (ref 21–32)
CREAT SERPL-MCNC: 1.64 MG/DL (ref 0.6–1.3)
DIFFERENTIAL METHOD BLD: ABNORMAL
EOSINOPHIL # BLD: 0.3 K/UL (ref 0–0.4)
EOSINOPHIL NFR BLD: 3 % (ref 0–5)
ERYTHROCYTE [DISTWIDTH] IN BLOOD BY AUTOMATED COUNT: 13.7 % (ref 11.6–14.5)
GLOBULIN SER CALC-MCNC: 3.2 G/DL (ref 2–4)
GLUCOSE SERPL-MCNC: 101 MG/DL (ref 74–99)
HCT VFR BLD AUTO: 41 % (ref 36–48)
HDLC SERPL-MCNC: 37 MG/DL (ref 40–60)
HDLC SERPL: 3.5 {RATIO} (ref 0–5)
HGB BLD-MCNC: 13.5 G/DL (ref 13–16)
LDLC SERPL CALC-MCNC: 53 MG/DL (ref 0–100)
LIPID PROFILE,FLP: ABNORMAL
LYMPHOCYTES # BLD: 0.8 K/UL (ref 0.9–3.6)
LYMPHOCYTES NFR BLD: 9 % (ref 21–52)
MCH RBC QN AUTO: 30.1 PG (ref 24–34)
MCHC RBC AUTO-ENTMCNC: 32.9 G/DL (ref 31–37)
MCV RBC AUTO: 91.3 FL (ref 74–97)
MONOCYTES # BLD: 0.8 K/UL (ref 0.05–1.2)
MONOCYTES NFR BLD: 9 % (ref 3–10)
NEUTS SEG # BLD: 7.2 K/UL (ref 1.8–8)
NEUTS SEG NFR BLD: 79 % (ref 40–73)
PLATELET # BLD AUTO: 248 K/UL (ref 135–420)
PMV BLD AUTO: 10.9 FL (ref 9.2–11.8)
POTASSIUM SERPL-SCNC: 4 MMOL/L (ref 3.5–5.5)
PROT SERPL-MCNC: 7.1 G/DL (ref 6.4–8.2)
RBC # BLD AUTO: 4.49 M/UL (ref 4.7–5.5)
SODIUM SERPL-SCNC: 140 MMOL/L (ref 136–145)
TRIGL SERPL-MCNC: 200 MG/DL (ref ?–150)
VLDLC SERPL CALC-MCNC: 40 MG/DL
WBC # BLD AUTO: 9.1 K/UL (ref 4.6–13.2)

## 2019-06-27 PROCEDURE — 80061 LIPID PANEL: CPT

## 2019-06-27 PROCEDURE — 85025 COMPLETE CBC W/AUTO DIFF WBC: CPT

## 2019-06-27 PROCEDURE — 80053 COMPREHEN METABOLIC PANEL: CPT

## 2019-06-27 PROCEDURE — 36415 COLL VENOUS BLD VENIPUNCTURE: CPT

## 2019-07-12 NOTE — PROGRESS NOTES
There is improvement in patient kidney function, patient advised to avoid all NSAIDs, and recheck kidney function in 3 months      Otherwise all his results are within normal limits

## 2019-07-17 ENCOUNTER — TELEPHONE (OUTPATIENT)
Dept: FAMILY MEDICINE CLINIC | Age: 74
End: 2019-07-17

## 2019-07-17 NOTE — TELEPHONE ENCOUNTER
----- Message from Roberta Frank MD sent at 7/12/2019  4:30 PM EDT -----  There is improvement in patient kidney function, patient advised to avoid all NSAIDs, and recheck kidney function in 3 months      Otherwise all his results are within normal limits

## 2019-10-22 DIAGNOSIS — M10.00 IDIOPATHIC GOUT, UNSPECIFIED CHRONICITY, UNSPECIFIED SITE: ICD-10-CM

## 2019-10-22 DIAGNOSIS — E78.00 HIGH CHOLESTEROL: ICD-10-CM

## 2019-10-22 DIAGNOSIS — K21.9 GASTROESOPHAGEAL REFLUX DISEASE WITHOUT ESOPHAGITIS: ICD-10-CM

## 2019-10-22 DIAGNOSIS — I10 ESSENTIAL HYPERTENSION: ICD-10-CM

## 2019-10-22 RX ORDER — LOSARTAN POTASSIUM AND HYDROCHLOROTHIAZIDE 12.5; 1 MG/1; MG/1
1 TABLET ORAL DAILY
Qty: 90 TAB | Refills: 0 | Status: SHIPPED | OUTPATIENT
Start: 2019-10-22 | End: 2020-01-08 | Stop reason: SDUPTHER

## 2019-10-22 RX ORDER — ALLOPURINOL 100 MG/1
100 TABLET ORAL DAILY
Qty: 90 TAB | Refills: 0 | Status: SHIPPED | OUTPATIENT
Start: 2019-10-22 | End: 2020-01-08 | Stop reason: SDUPTHER

## 2019-10-22 RX ORDER — ATENOLOL 100 MG/1
100 TABLET ORAL DAILY
Qty: 90 TAB | Refills: 0 | Status: SHIPPED | OUTPATIENT
Start: 2019-10-22 | End: 2020-01-08 | Stop reason: SDUPTHER

## 2019-10-22 RX ORDER — OMEPRAZOLE 20 MG/1
20 CAPSULE, DELAYED RELEASE ORAL DAILY
Qty: 90 CAP | Refills: 0 | Status: SHIPPED | OUTPATIENT
Start: 2019-10-22 | End: 2020-01-08 | Stop reason: SDUPTHER

## 2019-10-22 RX ORDER — NIFEDIPINE 60 MG/1
60 TABLET, EXTENDED RELEASE ORAL DAILY
Qty: 90 TAB | Refills: 0 | Status: SHIPPED | OUTPATIENT
Start: 2019-10-22 | End: 2020-01-08 | Stop reason: SDUPTHER

## 2019-10-22 RX ORDER — SIMVASTATIN 40 MG/1
40 TABLET, FILM COATED ORAL
Qty: 90 TAB | Refills: 0 | Status: SHIPPED | OUTPATIENT
Start: 2019-10-22 | End: 2020-01-08 | Stop reason: SDUPTHER

## 2019-10-22 NOTE — TELEPHONE ENCOUNTER
Pt calling to request med refill of:  Requested Prescriptions     Pending Prescriptions Disp Refills    allopurinol (ZYLOPRIM) 100 mg tablet 90 Tab 1     Sig: Take 1 Tab by mouth daily.  atenolol (TENORMIN) 100 mg tablet 90 Tab 1     Sig: Take 1 Tab by mouth daily.  losartan-hydroCHLOROthiazide (HYZAAR) 100-12.5 mg per tablet 90 Tab 1     Sig: Take 1 Tab by mouth daily.  omeprazole (PRILOSEC) 20 mg capsule 90 Cap 1     Sig: Take 1 Cap by mouth daily.  simvastatin (ZOCOR) 40 mg tablet 90 Tab 1     Sig: Take 1 Tab by mouth nightly.  NIFEdipine ER (ADALAT CC) 60 mg ER tablet 90 Tab 1     Sig: Take 1 Tab by mouth daily. Be sent to [facility]    Pt has 1 month tabs remaining     Pts last appt was 6/25/19  & no f/u scheduled    Pt advised of 72 hour time frame. Please advise.

## 2019-10-23 ENCOUNTER — CLINICAL SUPPORT (OUTPATIENT)
Dept: FAMILY MEDICINE CLINIC | Age: 74
End: 2019-10-23

## 2019-10-23 VITALS
HEART RATE: 58 BPM | TEMPERATURE: 98.2 F | OXYGEN SATURATION: 98 % | SYSTOLIC BLOOD PRESSURE: 123 MMHG | RESPIRATION RATE: 20 BRPM | DIASTOLIC BLOOD PRESSURE: 78 MMHG

## 2019-10-23 DIAGNOSIS — Z23 ENCOUNTER FOR IMMUNIZATION: Primary | ICD-10-CM

## 2019-10-23 NOTE — PROGRESS NOTES
Lora Meraz is a 66 y.o. male who presents for routine immunizations. The patient denies any symptoms , reactions or allergies that would exclude them from being immunized today. Risks and adverse reactions were discussed and the VIS was given to them. All questions were addressed. The patient was observed for 10 minutes post injection. There were no reactions observed. After obtaining informed consent, the immunization is given by Cara Gomez CMA.

## 2019-11-05 NOTE — TELEPHONE ENCOUNTER
Called to set up f/u appt pt says he wcb once he gets his calendar to set up something for December or January

## 2020-01-08 ENCOUNTER — OFFICE VISIT (OUTPATIENT)
Dept: FAMILY MEDICINE CLINIC | Age: 75
End: 2020-01-08

## 2020-01-08 VITALS
HEART RATE: 63 BPM | SYSTOLIC BLOOD PRESSURE: 116 MMHG | OXYGEN SATURATION: 100 % | RESPIRATION RATE: 16 BRPM | WEIGHT: 151.2 LBS | DIASTOLIC BLOOD PRESSURE: 71 MMHG | HEIGHT: 63 IN | BODY MASS INDEX: 26.79 KG/M2 | TEMPERATURE: 98.2 F

## 2020-01-08 DIAGNOSIS — K21.9 GASTROESOPHAGEAL REFLUX DISEASE WITHOUT ESOPHAGITIS: ICD-10-CM

## 2020-01-08 DIAGNOSIS — Z85.048 HISTORY OF RECTAL CANCER: ICD-10-CM

## 2020-01-08 DIAGNOSIS — I10 ESSENTIAL HYPERTENSION: Primary | ICD-10-CM

## 2020-01-08 DIAGNOSIS — E55.9 VITAMIN D DEFICIENCY: ICD-10-CM

## 2020-01-08 DIAGNOSIS — E78.2 MIXED HYPERLIPIDEMIA: ICD-10-CM

## 2020-01-08 DIAGNOSIS — M10.9 GOUT, ARTHRITIS: ICD-10-CM

## 2020-01-08 DIAGNOSIS — E78.00 HIGH CHOLESTEROL: ICD-10-CM

## 2020-01-08 DIAGNOSIS — N18.30 CKD (CHRONIC KIDNEY DISEASE) STAGE 3, GFR 30-59 ML/MIN (HCC): ICD-10-CM

## 2020-01-08 DIAGNOSIS — R56.9 SEIZURE (HCC): ICD-10-CM

## 2020-01-08 DIAGNOSIS — M10.00 IDIOPATHIC GOUT, UNSPECIFIED CHRONICITY, UNSPECIFIED SITE: ICD-10-CM

## 2020-01-08 RX ORDER — SIMVASTATIN 40 MG/1
40 TABLET, FILM COATED ORAL
Qty: 90 TAB | Refills: 1 | Status: SHIPPED | OUTPATIENT
Start: 2020-01-08 | End: 2020-07-07 | Stop reason: SDUPTHER

## 2020-01-08 RX ORDER — NIFEDIPINE 60 MG/1
60 TABLET, EXTENDED RELEASE ORAL DAILY
Qty: 90 TAB | Refills: 1 | Status: SHIPPED | OUTPATIENT
Start: 2020-01-08 | End: 2020-08-25 | Stop reason: SDUPTHER

## 2020-01-08 RX ORDER — OMEPRAZOLE 20 MG/1
20 CAPSULE, DELAYED RELEASE ORAL DAILY
Qty: 90 CAP | Refills: 1 | Status: SHIPPED | OUTPATIENT
Start: 2020-01-08 | End: 2020-08-25 | Stop reason: SDUPTHER

## 2020-01-08 RX ORDER — ALLOPURINOL 100 MG/1
100 TABLET ORAL DAILY
Qty: 90 TAB | Refills: 1 | Status: SHIPPED | OUTPATIENT
Start: 2020-01-08 | End: 2020-07-07 | Stop reason: SDUPTHER

## 2020-01-08 RX ORDER — LOSARTAN POTASSIUM AND HYDROCHLOROTHIAZIDE 12.5; 1 MG/1; MG/1
1 TABLET ORAL DAILY
Qty: 90 TAB | Refills: 1 | Status: SHIPPED | OUTPATIENT
Start: 2020-01-08 | End: 2020-02-26 | Stop reason: CLARIF

## 2020-01-08 RX ORDER — ATENOLOL 100 MG/1
100 TABLET ORAL DAILY
Qty: 90 TAB | Refills: 1 | Status: SHIPPED | OUTPATIENT
Start: 2020-01-08 | End: 2020-07-07 | Stop reason: SDUPTHER

## 2020-01-08 NOTE — PROGRESS NOTES
Wilfredo Nunez 42     Chief Complaint   Patient presents with    Hypertension     f/u    GERD     f/u    Cholesterol Problem     f/u    Gout     f/u     Vitals:    01/08/20 1442   BP: 116/71   Pulse: 63   Resp: 16   Temp: 98.2 °F (36.8 °C)   TempSrc: Oral   SpO2: 100%   Weight: 151 lb 3.2 oz (68.6 kg)   Height: 5' 3\" (1.6 m)   PainSc:   0 - No pain         HPI:Mr. Wes Perez is here today accompanied by his wife for follow-up and medication refill. He has been doing well no acute changes    He does have a history of colorectal cancer, and he has yearly follow-up with Kwan Arango MD      There has been no acute changes  No hospital admission  Medication has been reviewed and updated    Recent blood work from Dr. Joselyn Barakat has been reviewed    Past Medical History:   Diagnosis Date    Arthritis     Cancer ([facility] [address]     rectal    GERD (gastroesophageal reflux disease)     Hypercholesterolemia     Hypertension      History reviewed. No pertinent surgical history. Social History     Tobacco Use    Smoking status: Former Smoker    Smokeless tobacco: Never Used   Substance Use Topics    Alcohol use: No       Family History   Problem Relation Age of Onset    No Known Problems Mother     No Known Problems Father        Review of Systems   Constitutional: Negative for chills, fever, malaise/fatigue and weight loss. HENT: Negative for congestion, ear discharge, ear pain, hearing loss, nosebleeds, sinus pain and sore throat. Eyes: Negative for blurred vision, double vision and discharge. Respiratory: Negative for cough, hemoptysis, sputum production and shortness of breath. Cardiovascular: Negative for chest pain, palpitations, claudication and leg swelling. Gastrointestinal: Negative for abdominal pain, constipation, diarrhea, nausea and vomiting. Genitourinary: Negative for dysuria, frequency and urgency. Musculoskeletal: Negative for back pain, myalgias and neck pain.    Skin: Negative for itching and rash. Neurological: Negative for dizziness, tingling, sensory change, speech change, focal weakness, seizures, weakness and headaches. Psychiatric/Behavioral: Negative for depression, hallucinations, substance abuse and suicidal ideas. The patient is not nervous/anxious. Physical Exam  Vitals signs and nursing note reviewed. Constitutional:       General: He is not in acute distress. Appearance: He is well-developed. He is not diaphoretic. HENT:      Head: Normocephalic and atraumatic. Mouth/Throat:      Pharynx: No oropharyngeal exudate. Eyes:      General: No scleral icterus. Right eye: No discharge. Left eye: No discharge. Conjunctiva/sclera: Conjunctivae normal.      Pupils: Pupils are equal, round, and reactive to light. Neck:      Musculoskeletal: Normal range of motion and neck supple. Thyroid: No thyromegaly. Cardiovascular:      Rate and Rhythm: Normal rate and regular rhythm. Heart sounds: Normal heart sounds. Pulmonary:      Effort: Pulmonary effort is normal. No respiratory distress. Breath sounds: Normal breath sounds. No rales. Abdominal:      General: Bowel sounds are normal. There is no distension. Palpations: Abdomen is soft. There is no mass. Tenderness: There is no tenderness. There is no rebound. Musculoskeletal: Normal range of motion. General: No tenderness or deformity. Lymphadenopathy:      Cervical: No cervical adenopathy. Skin:     General: Skin is warm and dry. Findings: No erythema or rash. Neurological:      Mental Status: He is alert and oriented to person, place, and time. Cranial Nerves: No cranial nerve deficit. Coordination: Coordination normal.   Psychiatric:         Thought Content:  Thought content normal.         Judgment: Judgment normal.          Assessment and plan     Plan of care has been discussed with the patient, he agrees to the plan and verbalized understanding. All his questions were answered  More than 50% of the time spent in this visit was counseling the patient about  illness and treatment options         1. Idiopathic gout, unspecified chronicity, unspecified site  Stable no recent exacerbation  - allopurinol (ZYLOPRIM) 100 mg tablet; Take 1 Tab by mouth daily. Dispense: 90 Tab; Refill: 1    2. Essential hypertension  Well-controlled continue same medications  - atenolol (TENORMIN) 100 mg tablet; Take 1 Tab by mouth daily. Dispense: 90 Tab; Refill: 1  - losartan-hydroCHLOROthiazide (HYZAAR) 100-12.5 mg per tablet; Take 1 Tab by mouth daily. Dispense: 90 Tab; Refill: 1  - NIFEdipine ER (ADALAT CC) 60 mg ER tablet; Take 1 Tab by mouth daily. Dispense: 90 Tab; Refill: 1  - METABOLIC PANEL, COMPREHENSIVE; Future  - CBC WITH AUTOMATED DIFF; Future    3. High cholesterol  Patient is regular on statins  - simvastatin (ZOCOR) 40 mg tablet; Take 1 Tab by mouth nightly. Dispense: 90 Tab; Refill: 1  - CBC WITH AUTOMATED DIFF; Future    4. Gastroesophageal reflux disease without esophagitis  Stable no symptoms he is taking omeprazole daily  - omeprazole (PRILOSEC) 20 mg capsule; Take 1 Cap by mouth daily. Dispense: 90 Cap; Refill: 1    5. Vitamin D deficiency    Not currently on any vitamin D supplements    - VITAMIN D, 25 HYDROXY; Future    6. CKD (chronic kidney disease) stage 3, GFR 30-59 ml/min ([facility])  Strongly advised the patient to avoid all NSAIDs  He does take Aleve as needed for pain    I recommend that he should take Tylenol 2 tablets every 4-6 hours as needed  - CBC WITH AUTOMATED DIFF; Future    7. Mixed hyperlipidemia    - LIPID PANEL; Future  - CBC WITH AUTOMATED DIFF; Future    8. Gout, arthritis  Stable no recent exacerbation    9. Seizure ([facility] [address]  Stable patient is not having any seizures he is regular on Keppra 250 mg 2 times a day    10.  History of rectal cancer  His in remission following up with Dr. Linda Burns once a year    Current Outpatient Medications   Medication Sig Dispense Refill    allopurinol (ZYLOPRIM) 100 mg tablet Take 1 Tab by mouth daily. 90 Tab 1    atenolol (TENORMIN) 100 mg tablet Take 1 Tab by mouth daily. 90 Tab 1    losartan-hydroCHLOROthiazide (HYZAAR) 100-12.5 mg per tablet Take 1 Tab by mouth daily. 90 Tab 1    simvastatin (ZOCOR) 40 mg tablet Take 1 Tab by mouth nightly. 90 Tab 1    omeprazole (PRILOSEC) 20 mg capsule Take 1 Cap by mouth daily. 90 Cap 1    NIFEdipine ER (ADALAT CC) 60 mg ER tablet Take 1 Tab by mouth daily. 90 Tab 1    acetaminophen (TYLENOL EXTRA STRENGTH) 500 mg tablet Take  by mouth every six (6) hours as needed for Pain.  levETIRAcetam (KEPPRA) 250 mg tablet Take 250 mg by mouth two (2) times a day.  naproxen sodium (ALEVE) 220 mg tablet Take 220 mg by mouth two (2) times daily (with meals). Patient Active Problem List    Diagnosis Date Noted    Gastroesophageal reflux disease without esophagitis 01/04/2019    Essential hypertension 01/04/2019    Seizure ([address] 01/04/2019    Mixed hyperlipidemia 01/04/2019    Gout, arthritis 01/04/2019    CKD (chronic kidney disease) stage 3, GFR 30-59 ml/min ([facility]) 01/04/2019    Vitamin D deficiency 01/04/2019    History of rectal cancer 05/31/2013     Results for orders placed or performed during the hospital encounter of 79/45/08   METABOLIC PANEL, COMPREHENSIVE   Result Value Ref Range    Sodium 140 136 - 145 mmol/L    Potassium 4.0 3.5 - 5.5 mmol/L    Chloride 106 100 - 108 mmol/L    CO2 24 21 - 32 mmol/L    Anion gap 10 3.0 - 18 mmol/L    Glucose 101 (H) 74 - 99 mg/dL    BUN 25 (H) 7.0 - 18 MG/DL    Creatinine 1.64 (H) 0.6 - 1.3 MG/DL    BUN/Creatinine ratio 15 12 - 20      GFR est AA 50 (L) >60 ml/min/1.73m2    GFR est non-AA 41 (L) >60 ml/min/1.73m2    Calcium 8.5 8.5 - 10.1 MG/DL    Bilirubin, total 0.4 0.2 - 1.0 MG/DL    ALT (SGPT) 20 16 - 61 U/L    AST (SGOT) 15 15 - 37 U/L    Alk.  phosphatase 91 45 - 117 U/L    Protein, total 7.1 6.4 - 8.2 g/dL    Albumin 3.9 3.4 - 5.0 g/dL    Globulin 3.2 2.0 - 4.0 g/dL    A-G Ratio 1.2 0.8 - 1.7     LIPID PANEL   Result Value Ref Range    LIPID PROFILE          Cholesterol, total 130 <200 MG/DL    Triglyceride 200 (H) <150 MG/DL    HDL Cholesterol 37 (L) 40 - 60 MG/DL    LDL, calculated 53 0 - 100 MG/DL    VLDL, calculated 40 MG/DL    CHOL/HDL Ratio 3.5 0 - 5.0     CBC WITH AUTOMATED DIFF   Result Value Ref Range    WBC 9.1 4.6 - 13.2 K/uL    RBC 4.49 (L) 4.70 - 5.50 M/uL    HGB 13.5 13.0 - 16.0 g/dL    HCT 41.0 36.0 - 48.0 %    MCV 91.3 74.0 - 97.0 FL    MCH 30.1 24.0 - 34.0 PG    MCHC 32.9 31.0 - 37.0 g/dL    RDW 13.7 11.6 - 14.5 %    PLATELET 305 778 - 249 K/uL    MPV 10.9 9.2 - 11.8 FL    NEUTROPHILS 79 (H) 40 - 73 %    LYMPHOCYTES 9 (L) 21 - 52 %    MONOCYTES 9 3 - 10 %    EOSINOPHILS 3 0 - 5 %    BASOPHILS 0 0 - 2 %    ABS. NEUTROPHILS 7.2 1.8 - 8.0 K/UL    ABS. LYMPHOCYTES 0.8 (L) 0.9 - 3.6 K/UL    ABS. MONOCYTES 0.8 0.05 - 1.2 K/UL    ABS. EOSINOPHILS 0.3 0.0 - 0.4 K/UL    ABS. BASOPHILS 0.0 0.0 - 0.1 K/UL    DF AUTOMATED       No visits with results within 3 Month(s) from this visit.    Latest known visit with results is:   Hospital Outpatient Visit on 06/27/2019   Component Date Value Ref Range Status    Sodium 06/27/2019 140  136 - 145 mmol/L Final    Potassium 06/27/2019 4.0  3.5 - 5.5 mmol/L Final    Chloride 06/27/2019 106  100 - 108 mmol/L Final    CO2 06/27/2019 24  21 - 32 mmol/L Final    Anion gap 06/27/2019 10  3.0 - 18 mmol/L Final    Glucose 06/27/2019 101* 74 - 99 mg/dL Final    BUN 06/27/2019 25* 7.0 - 18 MG/DL Final    Creatinine 06/27/2019 1.64* 0.6 - 1.3 MG/DL Final    BUN/Creatinine ratio 06/27/2019 15  12 - 20   Final    GFR est AA 06/27/2019 50* >60 ml/min/1.73m2 Final    GFR est non-AA 06/27/2019 41* >60 ml/min/1.73m2 Final    Comment: (NOTE)  Estimated GFR is calculated using the Modification of Diet in Renal   Disease (MDRD) Study equation, reported for both  Americans   (GFRAA) and non- Americans (GFRNA), and normalized to 1.73m2   body surface area. The physician must decide which value applies to   the patient. The MDRD study equation should only be used in   individuals age 25 or older. It has not been validated for the   following: pregnant women, patients with serious comorbid conditions,   or on certain medications, or persons with extremes of body size,   muscle mass, or nutritional status.  Calcium 06/27/2019 8.5  8.5 - 10.1 MG/DL Final    Bilirubin, total 06/27/2019 0.4  0.2 - 1.0 MG/DL Final    ALT (SGPT) 06/27/2019 20  16 - 61 U/L Final    AST (SGOT) 06/27/2019 15  15 - 37 U/L Final    Alk. phosphatase 06/27/2019 91  45 - 117 U/L Final    Protein, total 06/27/2019 7.1  6.4 - 8.2 g/dL Final    Albumin 06/27/2019 3.9  3.4 - 5.0 g/dL Final    Globulin 06/27/2019 3.2  2.0 - 4.0 g/dL Final    A-G Ratio 06/27/2019 1.2  0.8 - 1.7   Final    LIPID PROFILE 06/27/2019        Final    Cholesterol, total 06/27/2019 130  <200 MG/DL Final    Triglyceride 06/27/2019 200* <150 MG/DL Final    Comment: The drugs N-acetylcysteine (NAC) and  Metamiszole have been found to cause falsely  low results in this chemical assay. Please  be sure to submit blood samples obtained  BEFORE administration of either of these  drugs to assure correct results.       HDL Cholesterol 06/27/2019 37* 40 - 60 MG/DL Final    LDL, calculated 06/27/2019 53  0 - 100 MG/DL Final    VLDL, calculated 06/27/2019 40  MG/DL Final    CHOL/HDL Ratio 06/27/2019 3.5  0 - 5.0   Final    WBC 06/27/2019 9.1  4.6 - 13.2 K/uL Final    RBC 06/27/2019 4.49* 4.70 - 5.50 M/uL Final    HGB 06/27/2019 13.5  13.0 - 16.0 g/dL Final    HCT 06/27/2019 41.0  36.0 - 48.0 % Final    MCV 06/27/2019 91.3  74.0 - 97.0 FL Final    MCH 06/27/2019 30.1  24.0 - 34.0 PG Final    MCHC 06/27/2019 32.9  31.0 - 37.0 g/dL Final    RDW 06/27/2019 13.7  11.6 - 14.5 % Final    PLATELET 29/44/6625 191  652 - 209 K/uL Final    MPV 06/27/2019 10.9  9.2 - 11.8 FL Final    NEUTROPHILS 06/27/2019 79* 40 - 73 % Final    LYMPHOCYTES 06/27/2019 9* 21 - 52 % Final    MONOCYTES 06/27/2019 9  3 - 10 % Final    EOSINOPHILS 06/27/2019 3  0 - 5 % Final    BASOPHILS 06/27/2019 0  0 - 2 % Final    ABS. NEUTROPHILS 06/27/2019 7.2  1.8 - 8.0 K/UL Final    ABS. LYMPHOCYTES 06/27/2019 0.8* 0.9 - 3.6 K/UL Final    ABS. MONOCYTES 06/27/2019 0.8  0.05 - 1.2 K/UL Final    ABS. EOSINOPHILS 06/27/2019 0.3  0.0 - 0.4 K/UL Final    ABS. BASOPHILS 06/27/2019 0.0  0.0 - 0.1 K/UL Final    DF 06/27/2019 AUTOMATED    Final          Follow-up and Dispositions    · Return for for medicare wellness.

## 2020-01-08 NOTE — PROGRESS NOTES
Yecenia lGaser presents today for   Chief Complaint   Patient presents with    Hypertension     f/u    GERD     f/u    Cholesterol Problem     f/u    Gout     f/u       Is someone accompanying this pt? yes    Is the patient using any DME equipment during OV? no    Depression Screening:  3 most recent PHQ Screens 6/25/2019   Little interest or pleasure in doing things Not at all   Feeling down, depressed, irritable, or hopeless Not at all   Total Score PHQ 2 0       Learning Assessment:  Learning Assessment 1/8/2020   PRIMARY LEARNER Patient   PRIMARY LANGUAGE ENGLISH   LEARNER PREFERENCE PRIMARY DEMONSTRATION     READING     VIDEOS     LISTENING     PICTURES   ANSWERED BY patient   RELATIONSHIP SELF       Abuse Screening:  Abuse Screening Questionnaire 1/8/2020   Do you ever feel afraid of your partner? N   Are you in a relationship with someone who physically or mentally threatens you? N   Is it safe for you to go home? Y       Fall Risk  Fall Risk Assessment, last 12 mths 6/25/2019   Able to walk? Yes   Fall in past 12 months? No   Fall with injury? -       Health Maintenance reviewed and discussed and ordered per Provider. Health Maintenance Due   Topic Date Due    DTaP/Tdap/Td series (1 - Tdap) 02/20/1956    Shingrix Vaccine Age 50> (1 of 2) 02/20/1993    GLAUCOMA SCREENING Q2Y  02/20/2008    MEDICARE YEARLY EXAM  08/23/2019   . Patient advised he is due for [address] and to schedule for next visit if not done today. Pt currently taking Antiplatelet therapy? no    Coordination of Care:  1. Have you been to the ER, urgent care clinic since your last visit? Hospitalized since your last visit? no    2. Have you seen or consulted any other health care providers outside of the [address] [place] [address] since your last visit? Include any pap smears or colon screening.  no

## 2020-02-26 DIAGNOSIS — I10 ESSENTIAL HYPERTENSION: Primary | ICD-10-CM

## 2020-02-26 RX ORDER — HYDROCHLOROTHIAZIDE 12.5 MG/1
12.5 TABLET ORAL DAILY
Qty: 90 TAB | Refills: 0 | Status: SHIPPED | OUTPATIENT
Start: 2020-02-26 | End: 2020-05-28 | Stop reason: SDUPTHER

## 2020-02-26 RX ORDER — LOSARTAN POTASSIUM 100 MG/1
100 TABLET ORAL DAILY
Qty: 90 TAB | Refills: 0 | Status: SHIPPED | OUTPATIENT
Start: 2020-02-26 | End: 2020-05-28 | Stop reason: SDUPTHER

## 2020-02-26 NOTE — TELEPHONE ENCOUNTER
Patient called stating that he tried to get his prescription for losartan-hydrochlorothiazide  filled at [facility] and they didn't have the combination drug available so he needs two separate prescriptions written for him. Patient wants prescription printed for him.

## 2020-05-28 DIAGNOSIS — I10 ESSENTIAL HYPERTENSION: ICD-10-CM

## 2020-05-28 RX ORDER — HYDROCHLOROTHIAZIDE 12.5 MG/1
12.5 TABLET ORAL DAILY
Qty: 90 TAB | Refills: 0 | Status: SHIPPED | OUTPATIENT
Start: 2020-05-28 | End: 2020-08-25 | Stop reason: SDUPTHER

## 2020-05-28 RX ORDER — LOSARTAN POTASSIUM 100 MG/1
100 TABLET ORAL DAILY
Qty: 90 TAB | Refills: 0 | Status: SHIPPED | OUTPATIENT
Start: 2020-05-28 | End: 2020-08-25 | Stop reason: SDUPTHER

## 2020-05-28 NOTE — TELEPHONE ENCOUNTER
[place] [address] [place]    Requested Prescriptions     Pending Prescriptions Disp Refills    hydroCHLOROthiazide (HYDRODIURIL) 12.5 mg tablet 90 Tab 0     Sig: Take 1 Tab by mouth daily.  losartan (COZAAR) 100 mg tablet 90 Tab 0     Sig: Take 1 Tab by mouth daily.

## 2020-05-28 NOTE — TELEPHONE ENCOUNTER
Catheter removed. Last seen 1/8/20    Last filled 2/26/20 qty 90 w/ 0 refills    Future Appointments   Date Time Provider Humberto Jc   7/7/2020  3:00 PM Crystal Jin  W. Ashwini Mccrary

## 2020-05-29 NOTE — TELEPHONE ENCOUNTER
Labs scheduled    Future Appointments   Date Time Provider Humberto Jc   6/3/2020 11:40 AM [facility]O   7/7/2020  3:00 PM Humza Lassiter MD [facility]

## 2020-06-03 ENCOUNTER — HOSPITAL ENCOUNTER (OUTPATIENT)
Dept: LAB | Age: 75
Discharge: HOME | End: 2020-06-03
Payer: MEDICARE

## 2020-06-03 DIAGNOSIS — E78.00 HIGH CHOLESTEROL: ICD-10-CM

## 2020-06-03 DIAGNOSIS — I10 ESSENTIAL HYPERTENSION: ICD-10-CM

## 2020-06-03 DIAGNOSIS — E55.9 VITAMIN D DEFICIENCY: ICD-10-CM

## 2020-06-03 DIAGNOSIS — N18.30 CKD (CHRONIC KIDNEY DISEASE) STAGE 3, GFR 30-59 ML/MIN (HCC): ICD-10-CM

## 2020-06-03 DIAGNOSIS — E78.2 MIXED HYPERLIPIDEMIA: ICD-10-CM

## 2020-06-03 LAB
25(OH)D3 SERPL-MCNC: 17.2 NG/ML (ref 30–100)
ALBUMIN SERPL-MCNC: 3.9 G/DL (ref 3.4–5)
ALBUMIN/GLOB SERPL: 1.1 {RATIO} (ref 0.8–1.7)
ALP SERPL-CCNC: 99 U/L (ref 45–117)
ALT SERPL-CCNC: 18 U/L (ref 16–61)
ANION GAP SERPL CALC-SCNC: 8 MMOL/L (ref 3–18)
AST SERPL-CCNC: 16 U/L (ref 10–38)
BASOPHILS # BLD: 0 K/UL (ref 0–0.1)
BASOPHILS NFR BLD: 0 % (ref 0–2)
BILIRUB SERPL-MCNC: 0.4 MG/DL (ref 0.2–1)
BUN SERPL-MCNC: 26 MG/DL (ref 7–18)
BUN/CREAT SERPL: 14 (ref 12–20)
CALCIUM SERPL-MCNC: 8.8 MG/DL (ref 8.5–10.1)
CHLORIDE SERPL-SCNC: 107 MMOL/L (ref 100–111)
CHOLEST SERPL-MCNC: 127 MG/DL
CO2 SERPL-SCNC: 25 MMOL/L (ref 21–32)
CREAT SERPL-MCNC: 1.86 MG/DL (ref 0.6–1.3)
DIFFERENTIAL METHOD BLD: ABNORMAL
EOSINOPHIL # BLD: 0.4 K/UL (ref 0–0.4)
EOSINOPHIL NFR BLD: 4 % (ref 0–5)
ERYTHROCYTE [DISTWIDTH] IN BLOOD BY AUTOMATED COUNT: 14.5 % (ref 11.6–14.5)
GLOBULIN SER CALC-MCNC: 3.5 G/DL (ref 2–4)
GLUCOSE SERPL-MCNC: 110 MG/DL (ref 74–99)
HCT VFR BLD AUTO: 39.9 % (ref 36–48)
HDLC SERPL-MCNC: 37 MG/DL (ref 40–60)
HDLC SERPL: 3.4 {RATIO} (ref 0–5)
HGB BLD-MCNC: 13.3 G/DL (ref 13–16)
LDLC SERPL CALC-MCNC: 57.4 MG/DL (ref 0–100)
LIPID PROFILE,FLP: ABNORMAL
LYMPHOCYTES # BLD: 1.4 K/UL (ref 0.9–3.6)
LYMPHOCYTES NFR BLD: 14 % (ref 21–52)
MCH RBC QN AUTO: 30.5 PG (ref 24–34)
MCHC RBC AUTO-ENTMCNC: 33.3 G/DL (ref 31–37)
MCV RBC AUTO: 91.5 FL (ref 74–97)
MONOCYTES # BLD: 1 K/UL (ref 0.05–1.2)
MONOCYTES NFR BLD: 10 % (ref 3–10)
NEUTS SEG # BLD: 7.1 K/UL (ref 1.8–8)
NEUTS SEG NFR BLD: 72 % (ref 40–73)
PLATELET # BLD AUTO: 254 K/UL (ref 135–420)
PMV BLD AUTO: 9.9 FL (ref 9.2–11.8)
POTASSIUM SERPL-SCNC: 4.3 MMOL/L (ref 3.5–5.5)
PROT SERPL-MCNC: 7.4 G/DL (ref 6.4–8.2)
RBC # BLD AUTO: 4.36 M/UL (ref 4.7–5.5)
SODIUM SERPL-SCNC: 140 MMOL/L (ref 136–145)
TRIGL SERPL-MCNC: 163 MG/DL (ref ?–150)
VLDLC SERPL CALC-MCNC: 32.6 MG/DL
WBC # BLD AUTO: 9.8 K/UL (ref 4.6–13.2)

## 2020-06-03 PROCEDURE — 80053 COMPREHEN METABOLIC PANEL: CPT

## 2020-06-03 PROCEDURE — 85025 COMPLETE CBC W/AUTO DIFF WBC: CPT

## 2020-06-03 PROCEDURE — 36415 COLL VENOUS BLD VENIPUNCTURE: CPT

## 2020-06-03 PROCEDURE — 82306 VITAMIN D 25 HYDROXY: CPT

## 2020-06-03 PROCEDURE — 80061 LIPID PANEL: CPT

## 2020-07-07 ENCOUNTER — VIRTUAL VISIT (OUTPATIENT)
Dept: FAMILY MEDICINE CLINIC | Age: 75
End: 2020-07-07

## 2020-07-07 ENCOUNTER — TELEPHONE (OUTPATIENT)
Dept: FAMILY MEDICINE CLINIC | Age: 75
End: 2020-07-07

## 2020-07-07 DIAGNOSIS — N18.30 CKD (CHRONIC KIDNEY DISEASE) STAGE 3, GFR 30-59 ML/MIN (HCC): ICD-10-CM

## 2020-07-07 DIAGNOSIS — M10.00 IDIOPATHIC GOUT, UNSPECIFIED CHRONICITY, UNSPECIFIED SITE: ICD-10-CM

## 2020-07-07 DIAGNOSIS — Z00.00 MEDICARE ANNUAL WELLNESS VISIT, SUBSEQUENT: Primary | ICD-10-CM

## 2020-07-07 DIAGNOSIS — E55.9 VITAMIN D DEFICIENCY: ICD-10-CM

## 2020-07-07 DIAGNOSIS — E78.00 HIGH CHOLESTEROL: ICD-10-CM

## 2020-07-07 DIAGNOSIS — R56.9 SEIZURE (HCC): ICD-10-CM

## 2020-07-07 DIAGNOSIS — E78.2 MIXED HYPERLIPIDEMIA: ICD-10-CM

## 2020-07-07 DIAGNOSIS — I10 ESSENTIAL HYPERTENSION: ICD-10-CM

## 2020-07-07 DIAGNOSIS — Z85.048 HISTORY OF RECTAL CANCER: ICD-10-CM

## 2020-07-07 RX ORDER — ERGOCALCIFEROL 1.25 MG/1
50000 CAPSULE ORAL
Qty: 12 CAP | Refills: 1 | Status: SHIPPED | OUTPATIENT
Start: 2020-07-07 | End: 2021-01-04 | Stop reason: SDUPTHER

## 2020-07-07 RX ORDER — SIMVASTATIN 40 MG/1
40 TABLET, FILM COATED ORAL
Qty: 90 TAB | Refills: 1 | Status: SHIPPED | OUTPATIENT
Start: 2020-07-07 | End: 2021-01-04 | Stop reason: SDUPTHER

## 2020-07-07 RX ORDER — ALLOPURINOL 100 MG/1
100 TABLET ORAL DAILY
Qty: 90 TAB | Refills: 1 | Status: SHIPPED | OUTPATIENT
Start: 2020-07-07 | End: 2021-01-04 | Stop reason: SDUPTHER

## 2020-07-07 RX ORDER — ATENOLOL 100 MG/1
100 TABLET ORAL DAILY
Qty: 90 TAB | Refills: 1 | Status: SHIPPED | OUTPATIENT
Start: 2020-07-07 | End: 2021-01-04 | Stop reason: SDUPTHER

## 2020-07-07 NOTE — PROGRESS NOTES
.  (AWV) The Initial Medicare Annual Wellness Exam PROGRESS NOTE    This is an Initial Medicare Annual Wellness Exam (AWV) (Performed 12 months after IPPE or effective date of Medicare Part B enrollment, Once in a lifetime)    I have reviewed the patient's medical history in detail and updated the computerized patient record. Kristofer Holman is a 66 y.o.  male and presents for an annual wellness exam       Patient Active Problem List   Diagnosis Code    Gastroesophageal reflux disease without esophagitis K21.9    Essential hypertension I10    Seizure ([facility] R56.9    Mixed hyperlipidemia E78.2    Gout, arthritis M10.9    CKD (chronic kidney disease) stage 3, GFR 30-59 ml/min ([facility]) N18.3    Vitamin D deficiency E55.9    History of rectal cancer Z85.048     Patient Active Problem List    Diagnosis Date Noted    Gastroesophageal reflux disease without esophagitis 01/04/2019    Essential hypertension 01/04/2019    Seizure ([facility] 01/04/2019    Mixed hyperlipidemia 01/04/2019    Gout, arthritis 01/04/2019    CKD (chronic kidney disease) stage 3, GFR 30-59 ml/min (HCC) 01/04/2019    Vitamin D deficiency 01/04/2019    History of rectal cancer 05/31/2013     Current Outpatient Medications   Medication Sig Dispense Refill    atenoloL (Tenormin) 100 mg tablet Take 1 Tab by mouth daily. 90 Tab 1    allopurinoL (ZYLOPRIM) 100 mg tablet Take 1 Tab by mouth daily. 90 Tab 1    simvastatin (Zocor) 40 mg tablet Take 1 Tab by mouth nightly. 90 Tab 1    ergocalciferol (ERGOCALCIFEROL) 1,250 mcg (50,000 unit) capsule Take 1 Cap by mouth every seven (7) days. 12 Cap 1    hydroCHLOROthiazide (HYDRODIURIL) 12.5 mg tablet Take 1 Tab by mouth daily. 90 Tab 0    losartan (COZAAR) 100 mg tablet Take 1 Tab by mouth daily. 90 Tab 0    omeprazole (PRILOSEC) 20 mg capsule Take 1 Cap by mouth daily. 90 Cap 1    NIFEdipine ER (ADALAT CC) 60 mg ER tablet Take 1 Tab by mouth daily.  90 Tab 1    levETIRAcetam (KEPPRA) 250 mg tablet Take 250 mg by mouth two (2) times a day.  acetaminophen (TYLENOL EXTRA STRENGTH) 500 mg tablet Take  by mouth every six (6) hours as needed for Pain. No Known Allergies  Past Medical History:   Diagnosis Date    Arthritis     Cancer ([facility]     rectal    GERD (gastroesophageal reflux disease)     Hypercholesterolemia     Hypertension      No past surgical history on file. Family History   Problem Relation Age of Onset    No Known Problems Mother     No Known Problems Father      Social History     Tobacco Use    Smoking status: Former Smoker    Smokeless tobacco: Never Used   Substance Use Topics    Alcohol use: No       ROS   History obtained from the patient  General ROS: negative for - chills, fatigue or hot flashes  Psychological ROS: negative for - anxiety or depression   Ophthalmic ROS: negative for - blurry vision, decreased vision or double vision  ENT ROS: positive for - hearing change  negative for - epistaxis or headaches  Allergy and Immunology ROS: negative  negative for - hives or insect bite sensitivity  Hematological and Lymphatic ROS: negative for - bleeding problems or blood clots  Endocrine ROS: negative for - malaise/lethargy or mood swings  [id number]}  Respiratory ROS: no cough, shortness of breath, or wheezing  Cardiovascular ROS: no chest pain or dyspnea on exertion  Gastrointestinal ROS: no abdominal pain, change in bowel habits, or black or bloody stools  Genito-Urinary ROS: no dysuria, trouble voiding, or hematuria  Musculoskeletal ROS: negative  Neurological ROS: no TIA or stroke symptoms  Dermatological ROS: negative  negative for - rash or skin lesion changes    All other systems reviewed and are negative. History     Past Medical History:   Diagnosis Date    Arthritis     Cancer ([facility]     rectal    GERD (gastroesophageal reflux disease)     Hypercholesterolemia     Hypertension       No past surgical history on file.   Current Outpatient Medications   Medication Sig Dispense Refill    atenoloL (Tenormin) 100 mg tablet Take 1 Tab by mouth daily. 90 Tab 1    allopurinoL (ZYLOPRIM) 100 mg tablet Take 1 Tab by mouth daily. 90 Tab 1    simvastatin (Zocor) 40 mg tablet Take 1 Tab by mouth nightly. 90 Tab 1    ergocalciferol (ERGOCALCIFEROL) 1,250 mcg (50,000 unit) capsule Take 1 Cap by mouth every seven (7) days. 12 Cap 1    hydroCHLOROthiazide (HYDRODIURIL) 12.5 mg tablet Take 1 Tab by mouth daily. 90 Tab 0    losartan (COZAAR) 100 mg tablet Take 1 Tab by mouth daily. 90 Tab 0    omeprazole (PRILOSEC) 20 mg capsule Take 1 Cap by mouth daily. 90 Cap 1    NIFEdipine ER (ADALAT CC) 60 mg ER tablet Take 1 Tab by mouth daily. 90 Tab 1    levETIRAcetam (KEPPRA) 250 mg tablet Take 250 mg by mouth two (2) times a day.  acetaminophen (TYLENOL EXTRA STRENGTH) 500 mg tablet Take  by mouth every six (6) hours as needed for Pain.        No Known Allergies  Family History   Problem Relation Age of Onset    No Known Problems Mother     No Known Problems Father      Social History     Tobacco Use    Smoking status: Former Smoker    Smokeless tobacco: Never Used   Substance Use Topics    Alcohol use: No     Patient Active Problem List   Diagnosis Code    Gastroesophageal reflux disease without esophagitis K21.9    Essential hypertension I10    Seizure ([facility] R56.9    Mixed hyperlipidemia E78.2    Gout, arthritis M10.9    CKD (chronic kidney disease) stage 3, GFR 30-59 ml/min ([facility]) N18.3    Vitamin D deficiency E55.9    History of rectal cancer Z85.048       Health Maintenance History  Immunizations reviewed,  To be his pneumonia vaccine when he comes to the office for visit    Colonoscopy: done by  Rectal surgeon colonoscopy has history of colon cancer and yearly follow-up with headache considered  Chest CT:he is not smoker   Eye exam: Obtain records from a [facility] [address]             Depression Risk Factor Screening:      Patient Health Questionnaire (PHQ-2)   Over the last 2 weeks, how often have you been bothered by any of the following problems? · Little interest or pleasure in doing things? · Not at all. [0]  · Feeling down, depressed, or hopeless? · Not at all. [0]    Total Score: 0/6  PHQ-2 Assessment Scoring:   A score of 2 or more requires further screening with the PHQ-9    Alcohol Risk Factor Screening:     Women: On any occasion during the past 3 months, have you had more than 3 drinks containing alcohol? Do you average more than 7 drinks per week? Men: On any occasion during the past 3 months, have you had more than 4 drinks containing alcohol? Do you average more than 14 drinks per week? Functional Ability and Level of Safety:     Hearing Loss    The patient needs further evaluation. Activities of Daily Living   Self-care. Requires assistance with: no ADLs    Fall Risk   No fall risk factors    Abuse Screen   Patient is not abused  None    Examination   Physical Examination  There were no vitals filed for this visit. There is no height or weight on file to calculate BMI.      Evaluation of Cognitive Function:  Mood/affect:Great   Appearance:well groomed   Family member/caregiver input:    alert, well appearing, and in no distress, oriented to person, place, and time and normal appearing weight    Patient Care Team:  Angi Hurtado MD as PCP - General (Internal Medicine)  Angi Hurtado MD as PCP - REHABILITATION [facility] Empaneled Provider  Justin Trotter MD (Internal Medicine)  Raymond Slater MD (Neurology)  Pete Parnell MD (Proctology)    End-of-life planning  Advanced Directive in the case than an injury or illness causes the patient to be unable to make health care decisions    Health Care Directive or Living Will: no    I have discussed with him regarding advance directive paper will be mailed to his address    Advice/Referrals/Counselling/Plan:   Education and counseling provided:  Are appropriate based on today's review and evaluation  Pneumococcal Vaccine  Prostate cancer screening tests (PSA, covered annually)  Colorectal cancer screening tests  Screening for glaucoma  Include in education list (weight loss, physical activity, smoking cessation, fall prevention, and nutrition)    ICD-10-CM ICD-9-CM    1. Medicare annual wellness visit, subsequent Z00.00 V70.0    2. Essential hypertension I10 401.9 atenoloL (Tenormin) 100 mg tablet      CBC WITH AUTOMATED DIFF   3. Idiopathic gout, unspecified chronicity, unspecified site M10.00 274.9 allopurinoL (ZYLOPRIM) 100 mg tablet   4. High cholesterol E78.00 272.0 simvastatin (Zocor) 40 mg tablet   5. Vitamin D deficiency E55.9 268.9 ergocalciferol (ERGOCALCIFEROL) 1,250 mcg (50,000 unit) capsule   6. CKD (chronic kidney disease) stage 3, GFR 30-59 ml/min (HCC) H12.8 250.6 METABOLIC PANEL, COMPREHENSIVE   7. Mixed hyperlipidemia E78.2 272.2 CBC WITH AUTOMATED DIFF   8. History of rectal cancer Z85.048 V10.06    9. Seizure ([facility] R56.9 780.39      Encounter Diagnoses   Name Primary?     Medicare annual wellness visit, subsequent Yes    Essential hypertension     Idiopathic gout, unspecified chronicity, unspecified site     High cholesterol     Vitamin D deficiency     CKD (chronic kidney disease) stage 3, GFR 30-59 ml/min (HCC)     Mixed hyperlipidemia     History of rectal cancer     [facility] ([facility]      Orders Placed This Encounter    METABOLIC PANEL, COMPREHENSIVE    CBC WITH AUTOMATED DIFF    atenoloL (Tenormin) 100 mg tablet    allopurinoL (ZYLOPRIM) 100 mg tablet    simvastatin (Zocor) 40 mg tablet    ergocalciferol (ERGOCALCIFEROL) 1,250 mcg (50,000 unit) capsule     Orders Placed This Encounter    METABOLIC PANEL, COMPREHENSIVE     Standing Status:   Future     Standing Expiration Date:   7/8/2021    CBC WITH AUTOMATED DIFF     Standing Status:   Future     Standing Expiration Date:   7/8/2021    atenoloL (Tenormin) 100 mg tablet     Sig: Take 1 Tab by mouth daily. Dispense:  90 Tab     Refill:  1    allopurinoL (ZYLOPRIM) 100 mg tablet     Sig: Take 1 Tab by mouth daily. Dispense:  90 Tab     Refill:  1    simvastatin (Zocor) 40 mg tablet     Sig: Take 1 Tab by mouth nightly. Dispense:  90 Tab     Refill:  1    ergocalciferol (ERGOCALCIFEROL) 1,250 mcg (50,000 unit) capsule     Sig: Take 1 Cap by mouth every seven (7) days. Dispense:  12 Cap     Refill:  1     Orders Placed This Encounter    METABOLIC PANEL, COMPREHENSIVE    CBC WITH AUTOMATED DIFF    atenoloL (Tenormin) 100 mg tablet    allopurinoL (ZYLOPRIM) 100 mg tablet    simvastatin (Zocor) 40 mg tablet    ergocalciferol (ERGOCALCIFEROL) 1,250 mcg (50,000 unit) capsule     Diagnoses and all orders for this visit:    1. Medicare annual wellness visit, subsequent          current treatment plan is effective, no change in therapy. Brief written plan, checklist    I have discussed the diagnosis with the patient and the intended plan as seen in the above orders. The patient has received an after-visit summary and questions were answered concerning future plans. I have discussed medication side effects and warnings with the patient as well. I have reviewed the plan of care with the patient, accepted their input and they are in agreement with the treatment goals.                ____________________________________________________________    Problem Assessment    for treatment of   Chief Complaint   Patient presents with   [facility] Wellness Visit         Follow Up Chronic Condition    Medication Refill

## 2020-07-07 NOTE — PROGRESS NOTES
Marlin Jeffrey is a 66 y.o. male who was seen by synchronous (real-time) audio-video technology on 7/7/2020 for Annual Wellness Visit (); Follow Up Chronic Condition; and Medication Refill  Patient is here for 6 months follow-up he has been doing well with no acute changes, adherent to all medications      He need medication refill    Is also due for blood work    Patient test at this time      Assessment & Plan:   Diagnoses and all orders for this visit:    2. Essential hypertension    Well controlled on current medication   -     atenoloL (Tenormin) 100 mg tablet; Take 1 Tab by mouth daily.  -     CBC WITH AUTOMATED DIFF; Future    3. Idiopathic gout, unspecified chronicity, unspecified site    Stable on allopurinol  -     allopurinoL (ZYLOPRIM) 100 mg tablet; Take 1 Tab by mouth daily. 4. High cholesterol  -     simvastatin (Zocor) 40 mg tablet; Take 1 Tab by mouth nightly. 5. Vitamin D deficiency      6 months ago patient had a low vitamin D he never took vitamin D supplement to be started today  -     ergocalciferol (ERGOCALCIFEROL) 1,250 mcg (50,000 unit) capsule; Take 1 Cap by mouth every seven (7) days. 6. CKD (chronic kidney disease) stage 3, GFR 30-59 ml/min ([facility])  -     METABOLIC PANEL, COMPREHENSIVE; Future    7. Mixed hyperlipidemia  -     CBC WITH AUTOMATED DIFF; Future    8. History of rectal cancer    Following up with a rectal surgeon Dr. Jeanette Bustos   9. Seizure ([facility]  Has not been losing any seizures for many years        712  Subjective:       Prior to Admission medications    Medication Sig Start Date End Date Taking? Authorizing Provider   atenoloL (Tenormin) 100 mg tablet Take 1 Tab by mouth daily. 7/7/20  Yes Agustin Mejía MD   allopurinoL (ZYLOPRIM) 100 mg tablet Take 1 Tab by mouth daily. 7/7/20  Yes Agustin Mejía MD   simvastatin (Zocor) 40 mg tablet Take 1 Tab by mouth nightly.  7/7/20  Yes Agustin Mejía MD   ergocalciferol (ERGOCALCIFEROL) 1,250 mcg (50,000 unit) capsule Take 1 Cap by mouth every seven (7) days. 7/7/20  Yes Richard Burgos MD   hydroCHLOROthiazide (HYDRODIURIL) 12.5 mg tablet Take 1 Tab by mouth daily. 5/28/20  Yes Richard Burgos MD   losartan (COZAAR) 100 mg tablet Take 1 Tab by mouth daily. 5/28/20  Yes Richard Burgos MD   omeprazole (PRILOSEC) 20 mg capsule Take 1 Cap by mouth daily. 1/8/20  Yes Richard Burgos MD   NIFEdipine ER (ADALAT CC) 60 mg ER tablet Take 1 Tab by mouth daily. 1/8/20  Yes Richard Burgos MD   levETIRAcetam (KEPPRA) 250 mg tablet Take 250 mg by mouth two (2) times a day. Yes Provider, Historical   acetaminophen (TYLENOL EXTRA STRENGTH) 500 mg tablet Take  by mouth every six (6) hours as needed for Pain. Yes Provider, Historical   allopurinol (ZYLOPRIM) 100 mg tablet Take 1 Tab by mouth daily. 1/8/20 7/7/20  Richard Burgos MD   atenolol (TENORMIN) 100 mg tablet Take 1 Tab by mouth daily. 1/8/20 7/7/20  Richard Burgos MD   simvastatin (ZOCOR) 40 mg tablet Take 1 Tab by mouth nightly. 1/8/20 7/7/20  Richard Burgos MD   naproxen sodium (ALEVE) 220 mg tablet Take 220 mg by mouth two (2) times daily (with meals).   7/7/20  Provider, Historical     Patient Active Problem List   Diagnosis Code    Gastroesophageal reflux disease without esophagitis K21.9    Essential hypertension I10    Seizure ([facility] R56.9    Mixed hyperlipidemia E78.2    Gout, arthritis M10.9    CKD (chronic kidney disease) stage 3, GFR 30-59 ml/min ([facility]) N18.3    Vitamin D deficiency E55.9    History of rectal cancer Z85.048     Patient Active Problem List    Diagnosis Date Noted    Gastroesophageal reflux disease without esophagitis 01/04/2019    Essential hypertension 01/04/2019    Seizure ([facility] 01/04/2019    Mixed hyperlipidemia 01/04/2019    Gout, arthritis 01/04/2019    CKD (chronic [facility] disease) stage 3, G[facility] 30[facility]59 ml[facility] 01/04/2019    Vitamin D deficiency 01/04/2019    History of rectal cancer 05/31/2013     Current Outpatient Medications   Medication Sig Dispense Refill    atenoloL (Tenormin) 100 mg tablet Take 1 Tab by mouth daily. 90 Tab 1    allopurinoL (ZYLOPRIM) 100 mg tablet Take 1 Tab by mouth daily. 90 Tab 1    simvastatin (Zocor) 40 mg tablet Take 1 Tab by mouth nightly. 90 Tab 1    ergocalciferol (ERGOCALCIFEROL) 1,250 mcg (50,000 unit) capsule Take 1 Cap by mouth every seven (7) days. 12 Cap 1    hydroCHLOROthiazide (HYDRODIURIL) 12.5 mg tablet Take 1 Tab by mouth daily. 90 Tab 0    losartan (COZAAR) 100 mg tablet Take 1 Tab by mouth daily. 90 Tab 0    omeprazole (PRILOSEC) 20 mg capsule Take 1 Cap by mouth daily. 90 Cap 1    NIFEdipine ER (ADALAT CC) 60 mg ER tablet Take 1 Tab by mouth daily. 90 Tab 1    levETIRAcetam (KEPPRA) 250 mg tablet Take 250 mg by mouth two (2) times a day.  acetaminophen (TYLENOL EXTRA STRENGTH) 500 mg tablet Take  by mouth every six (6) hours as needed for Pain. No Known Allergies  Past Medical History:   Diagnosis Date    Arthritis     Cancer ([facility]     rectal    GERD (gastroesophageal reflux disease)     Hypercholesterolemia     Hypertension      No past surgical history on file. Family History   Problem Relation Age of Onset    No Known Problems Mother     No Known Problems Father      Social History     Tobacco Use    Smoking status: Former Smoker    Smokeless tobacco: Never Used   Substance Use Topics    Alcohol use: No       Review of Systems   Constitutional: Negative for chills, fever, malaise/fatigue and weight loss. HENT: Positive for hearing loss. Negative for congestion, ear discharge, ear pain and nosebleeds. Eyes: Negative for blurred vision, double vision and discharge. Respiratory: Negative for cough. Cardiovascular: Negative for chest pain, palpitations, claudication and leg swelling. Gastrointestinal: Negative for abdominal pain, constipation, diarrhea, nausea and vomiting.    Genitourinary: Negative for dysuria, frequency and urgency. Musculoskeletal: Negative for myalgias. Skin: Negative for itching and rash. Neurological: Negative for dizziness, tingling, sensory change, speech change, focal weakness, weakness and headaches. Psychiatric/Behavioral: Negative for depression, hallucinations, substance abuse and suicidal ideas. The patient is not nervous/anxious and does not have insomnia. Objective:     Patient-Reported Vitals 7/7/2020   Patient-Reported Weight 152lb   Patient-Reported Systolic  562   Patient-Reported Diastolic 70      General: alert, cooperative, no distress   Mental  status: normal mood, behavior, speech, dress, motor activity, and thought processes, able to follow commands   HENT: NCAT   Neck: no visualized mass   Resp: no respiratory distress   Neuro: no gross deficits   Skin: no discoloration or lesions of concern on visible areas   Psychiatric: normal affect, consistent with stated mood, no evidence of hallucinations     Additional exam findings: We discussed the expected course, resolution and complications of the diagnosis(es) in detail. Medication risks, benefits, costs, interactions, and alternatives were discussed as indicated. I advised him to contact the office if his condition worsens, changes or fails to improve as anticipated. He expressed understanding with the diagnosis(es) and plan. [facility], who was evaluated through a patient-initiated, synchronous (real-time) audio-video encounter, and/or his healthcare decision maker, is aware that it is a billable service, with coverage as determined by his insurance carrier. He provided verbal consent to proceed: Yes, and patient identification was verified. It was conducted pursuant to the emergency declaration under the [facility], [address] [place] [address] authority and the [employer] Act.  A caregiver was present when appropriate. Ability to conduct physical exam was limited. I was in the office. The patient was at home.       Jacquelin Buckley MD

## 2020-07-07 NOTE — PROGRESS NOTES
Kristofer Holman is a 66 y.o. male (: 1945) presenting to address:    Chief Complaint   Patient presents with   [address]       There were no vitals filed for this visit. Hearing/Vision:   No exam data present    Learning Assessment:     Learning Assessment 2020   PRIMARY LEARNER Patient   PRIMARY LANGUAGE ENGLISH   LEARNER PREFERENCE PRIMARY DEMONSTRATION     READING     VIDEOS     LISTENING     PICTURES   ANSWERED BY patient   RELATIONSHIP SELF     Depression Screening:     3 most recent PHQ Screens 2020   Little interest or pleasure in doing things Not at all   Feeling down, depressed, irritable, or hopeless Not at all   Total Score PHQ 2 0     Fall Risk Assessment:     Fall Risk Assessment, last 12 mths 2020   Able to walk? Yes   Fall in past 12 months? No   Fall with injury? -     Abuse Screening:     Abuse Screening Questionnaire 2020   Do you ever feel afraid of your partner? N   Are you in a relationship with someone who physically or mentally threatens you? N   Is it safe for you to go home? Y     Coordination of Care Questionaire:   1. Have you been to the ER, urgent care clinic since your last visit? Hospitalized since your last visit? NO    2. Have you seen or consulted any other health care providers outside of the [address] [place] [address] since your last visit? Include any pap smears or colon screening. NO    Advanced Directive:   1. Do you have an Advanced Directive? NO    2. Would you like information on Advanced Directives?  NO

## 2020-07-07 NOTE — PROGRESS NOTES
[facility] Chief Complaint Patient presents with [address] [place] [address] Annual Wellness Visit   Follow Up Chronic Condition  Medication Refill There were no vitals filed for this visit. HPI:*** Past Medical History:  
Diagnosis Date  Arthritis  Cancer ([address] rectal  
 GERD (gastroesophageal reflux disease)  Hypercholesterolemia  Hypertension No past surgical history on file. Social History Tobacco Use  Smoking status: Former Smoker  Smokeless tobacco: Never Used Substance Use Topics  Alcohol use: No  
 
 
Family History Problem Relation Age of Onset  No Known Problems Mother  No Known Problems Father ROS Physical Exam  
 
Assessment and plan  
 
Plan of care has been discussed with the patient, he agrees to the plan and verbalized understanding. All his questions were answered More than 50% of the time spent in this visit was counseling the patient about  illness and treatment options 1. Essential hypertension *** 
- atenoloL (Tenormin) 100 mg tablet; Take 1 Tab by mouth daily. Dispense: 90 Tab; Refill: 1 
 
2. Idiopathic gout, unspecified chronicity, unspecified site *** 
- allopurinoL (ZYLOPRIM) 100 mg tablet; Take 1 Tab by mouth daily. Dispense: 90 Tab; Refill: 1 3. High cholesterol *** 
- simvastatin (Zocor) 40 mg tablet; Take 1 Tab by mouth nightly. Dispense: 90 Tab; Refill: 1 Current Outpatient Medications Medication Sig Dispense Refill  atenoloL (Tenormin) 100 mg tablet Take 1 Tab by mouth daily. 90 Tab 1  
 allopurinoL (ZYLOPRIM) 100 mg tablet Take 1 Tab by mouth daily. 90 Tab 1  
 simvastatin (Zocor) 40 mg tablet Take 1 Tab by mouth nightly. 90 Tab 1  
 hydroCHLOROthiazide (HYDRODIURIL) 12.5 mg tablet Take 1 Tab by mouth daily. 90 Tab 0  
 losartan (COZAAR) 100 mg tablet Take 1 Tab by mouth daily. 90 Tab 0  
 omeprazole (PRILOSEC) 20 mg capsule Take 1 Cap by mouth daily.  90 Cap 1  
  NIFEdipine ER (ADALAT CC) 60 mg ER tablet Take 1 Tab by mouth daily. 90 Tab 1  
 levETIRAcetam (KEPPRA) 250 mg tablet Take 250 mg by mouth two (2) times a day.  acetaminophen (TYLENOL EXTRA STRENGTH) 500 mg tablet Take  by mouth every six (6) hours as needed for Pain. Patient Active Problem List  
 Diagnosis Date Noted  Gastroesophageal reflux disease without esophagitis 01/04/2019  Essential hypertension 01/04/2019  Seizure ([address] 01/04/2019  Mixed hyperlipidemia 01/04/2019  Gout, arthritis 01/04/2019  CKD (chronic kidney disease) stage 3, GFR 30-59 ml/min ([facility]) 01/04/2019  Vitamin D deficiency 01/04/2019  
 History of rectal cancer 05/31/2013 Results for orders placed or performed during the hospital encounter of 06/03/20 VITAMIN D, 25 HYDROXY Result Value Ref Range Vitamin D 25-Hydroxy 17.2 (L) 30 - 100 ng/mL METABOLIC PANEL, COMPREHENSIVE Result Value Ref Range Sodium 140 136 - 145 mmol/L Potassium 4.3 3.5 - 5.5 mmol/L Chloride 107 100 - 111 mmol/L  
 CO2 25 21 - 32 mmol/L Anion gap 8 3.0 - 18 mmol/L Glucose 110 (H) 74 - 99 mg/dL BUN 26 (H) 7.0 - 18 MG/DL Creatinine 1.86 (H) 0.6 - 1.3 MG/DL  
 BUN/Creatinine ratio 14 12 - 20 GFR est AA 43 (L) >60 ml/min/1.73m2 GFR est non-AA 35 (L) >60 ml/min/1.73m2 Calcium 8.8 8.5 - 10.1 MG/DL Bilirubin, total 0.4 0.2 - 1.0 MG/DL  
 ALT (SGPT) 18 16 - 61 U/L  
 AST (SGOT) 16 10 - 38 U/L Alk. phosphatase 99 45 - 117 U/L Protein, total 7.4 6.4 - 8.2 g/dL Albumin 3.9 3.4 - 5.0 g/dL Globulin 3.5 2.0 - 4.0 g/dL A-G Ratio 1.1 0.8 - 1.7 LIPID PANEL Result Value Ref Range LIPID PROFILE Cholesterol, total 127 <200 MG/DL Triglyceride 163 (H) <150 MG/DL  
 HDL Cholesterol 37 (L) 40 - 60 MG/DL  
 LDL, calculated 57.4 0 - 100 MG/DL VLDL, calculated 32.6 MG/DL  
 CHOL/HDL Ratio 3.4 0 - 5.0    
CBC WITH AUTOMATED DIFF Result Value Ref Range WBC 9.8 4.6 - 13.2 K/uL  
 RBC 4.36 (L) 4.70 - 5.50 M/uL  
 HGB 13.3 13.0 - 16.0 g/dL HCT 39.9 36.0 - 48.0 % MCV 91.5 74.0 - 97.0 FL  
 MCH 30.5 24.0 - 34.0 PG  
 MCHC 33.3 31.0 - 37.0 g/dL  
 RDW 14.5 11.6 - 14.5 % PLATELET 854 535 - 347 K/uL MPV 9.9 9.2 - 11.8 FL  
 NEUTROPHILS 72 40 - 73 % LYMPHOCYTES 14 (L) 21 - 52 % MONOCYTES 10 3 - 10 % EOSINOPHILS 4 0 - 5 % BASOPHILS 0 0 - 2 %  
 ABS. NEUTROPHILS 7.1 1.8 - 8.0 K/UL  
 ABS. LYMPHOCYTES 1.4 0.9 - 3.6 K/UL  
 ABS. MONOCYTES 1.0 0.05 - 1.2 K/UL  
 ABS. EOSINOPHILS 0.4 0.0 - 0.4 K/UL  
 ABS. BASOPHILS 0.0 0.0 - 0.1 K/UL  
 DF AUTOMATED Hospital Outpatient Visit on 06/03/2020 Component Date Value Ref Range Status  Vitamin D 25-Hydroxy 06/03/2020 17.2* 30 - 100 ng/mL Final  
 Comment: (NOTE) Deficiency               <20 ng/mL Insufficiency          20-30 ng/mL Sufficient             ng/mL Possible toxicity       >100 ng/mL The Method used is [facility] currently standardized to a Center of Disease Control and Prevention (CDC) certified reference  
method. Samples containing fluorescein dye can produce falsely  
elevated values when tested with the [employer] Centaur Vitamin D Assay. It is recommended that results in the toxic range, >100 ng/mL, be  
retested 72 hours post fluorescein exposure.  Sodium 06/03/2020 140  136 - 145 mmol/L Final  
 Potassium 06/03/2020 4.3  3.5 - 5.5 mmol/L Final  
 Chloride 06/03/2020 107  100 - 111 mmol/L Final  
 CO2 06/03/2020 25  21 - 32 mmol/L Final  
 Anion gap 06/03/2020 8  3.0 - 18 mmol/L Final  
 Glucose 06/03/2020 110* 74 - 99 mg/dL Final  
 BUN 06/03/2020 26* 7.0 - 18 MG/DL Final  
 Creatinine 06/03/2020 1.86* 0.6 - 1.3 MG/DL Final  
 BUN/Creatinine ratio 06/03/2020 14  12 - 20   Final  
 GFR est AA 06/03/2020 43* >60 ml/min/1.73m2 Final  
 GFR est non-AA 06/03/2020 35* >60 ml/min/1.73m2 Final  
 Comment: (NOTE) Estimated GFR is calculated using the Modification of Diet in Renal  
Disease (MDRD) Study equation, reported for both  Americans [facility]) and non- Americans (GFRNA), and normalized to 1.73m2  
body surface area. The physician must decide which value applies to  
the patient. The MDRD study equation should only be used in  
individuals age 25 or older. It has not been validated for the  
following: pregnant women, patients with serious comorbid conditions,  
or on certain medications, or persons with extremes of body size,  
muscle mass, or nutritional status.  Calcium 06/03/2020 8.8  8.5 - 10.1 MG/DL Final  
 Bilirubin, total 06/03/2020 0.4  0.2 - 1.0 MG/DL Final  
 ALT (SGPT) 06/03/2020 18  16 - 61 U/L Final  
 AST (SGOT) 06/03/2020 16  10 - 38 U/L Final  
 Alk. phosphatase 06/03/2020 99  45 - 117 U/L Final  
 Protein, total 06/03/2020 7.4  6.4 - 8.2 g/dL Final  
 Albumin 06/03/2020 3.9  3.4 - 5.0 g/dL Final  
 Globulin 06/03/2020 3.5  2.0 - 4.0 g/dL Final  
 A-G Ratio 06/03/2020 1.1  0.8 - 1.7   Final  
 LIPID PROFILE 06/03/2020        Final  
 Cholesterol, total 06/03/2020 127  <200 MG/DL Final  
 Triglyceride 06/03/2020 163* <150 MG/DL Final  
 Comment: The drugs N-acetylcysteine (NAC) and 
Metamiszole have been found to cause falsely 
low results in this chemical assay. Please 
be sure to submit blood samples obtained BEFORE administration of either of these 
drugs to assure correct results.  
  
 HDL Cholesterol 06/03/2020 37* 40 - 60 MG/DL Final  
 LDL, calculated 06/03/2020 57.4  0 - 100 MG/DL Final  
 VLDL, calculated 06/03/2020 32.6  MG/DL Final  
 CHOL/HDL Ratio 06/03/2020 3.4  0 - 5.0   Final  
 WBC 06/03/2020 9.8  4.6 - 13.2 K/uL Final  
 RBC 06/03/2020 4.36* 4.70 - 5.50 M/uL Final  
 HGB 06/03/2020 13.3  13.0 - 16.0 g/dL Final  
 HCT 06/03/2020 39.9  36.0 - 48.0 % Final  
 MCV 06/03/2020 91.5  74.0 - 97.0 FL Final  
 MCH 06/03/2020 30.5  24.0 - 34.0 PG Final  
  MCHC 06/03/2020 33.3  31.0 - 37.0 g/dL Final  
 RDW 06/03/2020 14.5  11.6 - 14.5 % Final  
 PLATELET 09/03/7581 726  135 - 420 K/uL Final  
 MPV 06/03/2020 9.9  9.2 - 11.8 FL Final  
 NEUTROPHILS 06/03/2020 72  40 - 73 % Final  
 LYMPHOCYTES 06/03/2020 14* 21 - 52 % Final  
 MONOCYTES 06/03/2020 10  3 - 10 % Final  
 EOSINOPHILS 06/03/2020 4  0 - 5 % Final  
 BASOPHILS 06/03/2020 0  0 - 2 % Final  
 ABS. NEUTROPHILS 06/03/2020 7.1  1.8 - 8.0 K/UL Final  
 ABS. LYMPHOCYTES 06/03/2020 1.4  0.9 - 3.6 K/UL Final  
 ABS. MONOCYTES 06/03/2020 1.0  0.05 - 1.2 K/UL Final  
 ABS. EOSINOPHILS 06/03/2020 0.4  0.0 - 0.4 K/UL Final  
 ABS.  BASOPHILS 06/03/2020 0.0  0.0 - 0.1 K/UL Final  
 DF 06/03/2020 AUTOMATED    Final

## 2020-08-25 DIAGNOSIS — I10 ESSENTIAL HYPERTENSION: ICD-10-CM

## 2020-08-25 DIAGNOSIS — K21.9 GASTROESOPHAGEAL REFLUX DISEASE WITHOUT ESOPHAGITIS: ICD-10-CM

## 2020-08-25 RX ORDER — OMEPRAZOLE 20 MG/1
20 CAPSULE, DELAYED RELEASE ORAL DAILY
Qty: 90 CAP | Refills: 1 | Status: SHIPPED | OUTPATIENT
Start: 2020-08-25 | End: 2021-01-26 | Stop reason: SDUPTHER

## 2020-08-25 RX ORDER — HYDROCHLOROTHIAZIDE 12.5 MG/1
12.5 TABLET ORAL DAILY
Qty: 90 TAB | Refills: 0 | Status: SHIPPED | OUTPATIENT
Start: 2020-08-25 | End: 2020-11-30 | Stop reason: SDUPTHER

## 2020-08-25 RX ORDER — LOSARTAN POTASSIUM 100 MG/1
100 TABLET ORAL DAILY
Qty: 90 TAB | Refills: 0 | Status: SHIPPED | OUTPATIENT
Start: 2020-08-25 | End: 2020-11-30 | Stop reason: SDUPTHER

## 2020-08-25 RX ORDER — NIFEDIPINE 60 MG/1
60 TABLET, EXTENDED RELEASE ORAL DAILY
Qty: 90 TAB | Refills: 1 | Status: SHIPPED | OUTPATIENT
Start: 2020-08-25 | End: 2021-01-26 | Stop reason: SDUPTHER

## 2020-08-25 NOTE — TELEPHONE ENCOUNTER
Pt called requesting medication refill, please advise    Requested Prescriptions     Pending Prescriptions Disp Refills    omeprazole (PriLOSEC) 20 mg capsule 90 Cap 1     Sig: Take 1 Cap by mouth daily.  NIFEdipine ER (ADALAT CC) 60 mg ER tablet 90 Tab 1     Sig: Take 1 Tab by mouth daily.  losartan (COZAAR) 100 mg tablet 90 Tab 0     Sig: Take 1 Tab by mouth daily.  hydroCHLOROthiazide (HYDRODIURIL) 12.5 mg tablet 90 Tab 0     Sig: Take 1 Tab by mouth daily.

## 2020-10-21 ENCOUNTER — APPOINTMENT (OUTPATIENT)
Dept: FAMILY MEDICINE CLINIC | Age: 75
End: 2020-10-21

## 2020-10-21 ENCOUNTER — HOSPITAL ENCOUNTER (OUTPATIENT)
Dept: LAB | Age: 75
Discharge: HOME | End: 2020-10-21
Payer: MEDICARE

## 2020-10-21 DIAGNOSIS — N18.30 CKD (CHRONIC KIDNEY DISEASE) STAGE 3, GFR 30-59 ML/MIN (HCC): ICD-10-CM

## 2020-10-21 DIAGNOSIS — E78.2 MIXED HYPERLIPIDEMIA: ICD-10-CM

## 2020-10-21 DIAGNOSIS — I10 ESSENTIAL HYPERTENSION: ICD-10-CM

## 2020-10-21 LAB
ALBUMIN SERPL-MCNC: 4 G/DL (ref 3.4–5)
ALBUMIN/GLOB SERPL: 1.3 {RATIO} (ref 0.8–1.7)
ALP SERPL-CCNC: 98 U/L (ref 45–117)
ALT SERPL-CCNC: 18 U/L (ref 16–61)
ANION GAP SERPL CALC-SCNC: 9 MMOL/L (ref 3–18)
AST SERPL-CCNC: 17 U/L (ref 10–38)
BASOPHILS # BLD: 0 K/UL (ref 0–0.1)
BASOPHILS NFR BLD: 0 % (ref 0–2)
BILIRUB SERPL-MCNC: 0.6 MG/DL (ref 0.2–1)
BUN SERPL-MCNC: 25 MG/DL (ref 7–18)
BUN/CREAT SERPL: 15 (ref 12–20)
CALCIUM SERPL-MCNC: 9.1 MG/DL (ref 8.5–10.1)
CHLORIDE SERPL-SCNC: 106 MMOL/L (ref 100–111)
CO2 SERPL-SCNC: 25 MMOL/L (ref 21–32)
CREAT SERPL-MCNC: 1.66 MG/DL (ref 0.6–1.3)
DIFFERENTIAL METHOD BLD: ABNORMAL
EOSINOPHIL # BLD: 0.3 K/UL (ref 0–0.4)
EOSINOPHIL NFR BLD: 3 % (ref 0–5)
ERYTHROCYTE [DISTWIDTH] IN BLOOD BY AUTOMATED COUNT: 13.6 % (ref 11.6–14.5)
GLOBULIN SER CALC-MCNC: 3.2 G/DL (ref 2–4)
GLUCOSE SERPL-MCNC: 104 MG/DL (ref 74–99)
HCT VFR BLD AUTO: 39.8 % (ref 36–48)
HGB BLD-MCNC: 13.4 G/DL (ref 13–16)
LYMPHOCYTES # BLD: 1.4 K/UL (ref 0.9–3.6)
LYMPHOCYTES NFR BLD: 18 % (ref 21–52)
MCH RBC QN AUTO: 30.2 PG (ref 24–34)
MCHC RBC AUTO-ENTMCNC: 33.7 G/DL (ref 31–37)
MCV RBC AUTO: 89.8 FL (ref 74–97)
MONOCYTES # BLD: 1.1 K/UL (ref 0.05–1.2)
MONOCYTES NFR BLD: 13 % (ref 3–10)
NEUTS SEG # BLD: 5.4 K/UL (ref 1.8–8)
NEUTS SEG NFR BLD: 66 % (ref 40–73)
PLATELET # BLD AUTO: 236 K/UL (ref 135–420)
PMV BLD AUTO: 10.7 FL (ref 9.2–11.8)
POTASSIUM SERPL-SCNC: 4.4 MMOL/L (ref 3.5–5.5)
PROT SERPL-MCNC: 7.2 G/DL (ref 6.4–8.2)
RBC # BLD AUTO: 4.43 M/UL (ref 4.7–5.5)
SODIUM SERPL-SCNC: 140 MMOL/L (ref 136–145)
WBC # BLD AUTO: 8.2 K/UL (ref 4.6–13.2)

## 2020-10-21 PROCEDURE — 36415 COLL VENOUS BLD VENIPUNCTURE: CPT

## 2020-10-21 PROCEDURE — 80053 COMPREHEN METABOLIC PANEL: CPT

## 2020-10-21 PROCEDURE — 85025 COMPLETE CBC W/AUTO DIFF WBC: CPT

## 2020-11-30 DIAGNOSIS — I10 ESSENTIAL HYPERTENSION: ICD-10-CM

## 2020-12-01 NOTE — TELEPHONE ENCOUNTER
Patient last refill was 05/25/2020 qty of 90 with 0 refills  No future appointments. Last OV was 07/07/2020 VV.

## 2020-12-02 RX ORDER — HYDROCHLOROTHIAZIDE 12.5 MG/1
12.5 TABLET ORAL DAILY
Qty: 90 TAB | Refills: 0 | Status: SHIPPED | OUTPATIENT
Start: 2020-12-02 | End: 2021-01-26 | Stop reason: SDUPTHER

## 2020-12-02 RX ORDER — LOSARTAN POTASSIUM 100 MG/1
100 TABLET ORAL DAILY
Qty: 90 TAB | Refills: 0 | Status: SHIPPED | OUTPATIENT
Start: 2020-12-02 | End: 2021-01-26 | Stop reason: SDUPTHER

## 2021-01-04 ENCOUNTER — TELEPHONE (OUTPATIENT)
Dept: FAMILY MEDICINE CLINIC | Age: 76
End: 2021-01-04

## 2021-01-04 DIAGNOSIS — I10 ESSENTIAL HYPERTENSION: ICD-10-CM

## 2021-01-04 DIAGNOSIS — E78.00 HIGH CHOLESTEROL: ICD-10-CM

## 2021-01-04 DIAGNOSIS — M10.00 IDIOPATHIC GOUT, UNSPECIFIED CHRONICITY, UNSPECIFIED SITE: ICD-10-CM

## 2021-01-04 DIAGNOSIS — E55.9 VITAMIN D DEFICIENCY: ICD-10-CM

## 2021-01-04 RX ORDER — SIMVASTATIN 40 MG/1
40 TABLET, FILM COATED ORAL
Qty: 90 TAB | Refills: 1 | Status: SHIPPED | OUTPATIENT
Start: 2021-01-04 | End: 2021-06-30 | Stop reason: SDUPTHER

## 2021-01-04 RX ORDER — ATENOLOL 100 MG/1
100 TABLET ORAL DAILY
Qty: 90 TAB | Refills: 1 | Status: SHIPPED | OUTPATIENT
Start: 2021-01-04 | End: 2021-06-30 | Stop reason: SDUPTHER

## 2021-01-04 RX ORDER — ALLOPURINOL 100 MG/1
100 TABLET ORAL DAILY
Qty: 90 TAB | Refills: 1 | Status: SHIPPED | OUTPATIENT
Start: 2021-01-04 | End: 2021-06-30 | Stop reason: SDUPTHER

## 2021-01-04 RX ORDER — ERGOCALCIFEROL 1.25 MG/1
50000 CAPSULE ORAL
Qty: 12 CAP | Refills: 1 | Status: SHIPPED | OUTPATIENT
Start: 2021-01-04 | End: 2021-06-30 | Stop reason: SDUPTHER

## 2021-01-04 NOTE — TELEPHONE ENCOUNTER
Last medication refills were 07/07/2020 qty 90   Future Appointments   Date Time Provider Humberto Jc   1/26/2021 11:00 AM Ty Perez MD [facility]

## 2021-01-04 NOTE — TELEPHONE ENCOUNTER
Is patient due for an follow up appointment? I did not see it in your last note. Please assist, thank you.

## 2021-01-06 LAB — CREATININE, EXTERNAL: 1.65

## 2021-01-26 ENCOUNTER — OFFICE VISIT (OUTPATIENT)
Dept: FAMILY MEDICINE CLINIC | Age: 76
End: 2021-01-26
Payer: MEDICARE

## 2021-01-26 ENCOUNTER — TELEPHONE (OUTPATIENT)
Dept: FAMILY MEDICINE CLINIC | Age: 76
End: 2021-01-26

## 2021-01-26 VITALS
HEIGHT: 63 IN | BODY MASS INDEX: 27.07 KG/M2 | SYSTOLIC BLOOD PRESSURE: 113 MMHG | DIASTOLIC BLOOD PRESSURE: 72 MMHG | HEART RATE: 78 BPM | WEIGHT: 152.8 LBS | TEMPERATURE: 97.2 F | RESPIRATION RATE: 16 BRPM | OXYGEN SATURATION: 98 %

## 2021-01-26 DIAGNOSIS — D69.6 THROMBOCYTOPENIA (HCC): ICD-10-CM

## 2021-01-26 DIAGNOSIS — M79.89 LEG SWELLING: ICD-10-CM

## 2021-01-26 DIAGNOSIS — C20 RECTAL CANCER (HCC): ICD-10-CM

## 2021-01-26 DIAGNOSIS — Z23 ENCOUNTER FOR IMMUNIZATION: Primary | ICD-10-CM

## 2021-01-26 DIAGNOSIS — R56.9 SEIZURE (HCC): ICD-10-CM

## 2021-01-26 DIAGNOSIS — K21.9 GASTROESOPHAGEAL REFLUX DISEASE WITHOUT ESOPHAGITIS: ICD-10-CM

## 2021-01-26 DIAGNOSIS — I10 ESSENTIAL HYPERTENSION: ICD-10-CM

## 2021-01-26 PROCEDURE — 99214 OFFICE O/P EST MOD 30 MIN: CPT | Performed by: LEGAL MEDICINE

## 2021-01-26 PROCEDURE — 90732 PPSV23 VACC 2 YRS+ SUBQ/IM: CPT | Performed by: LEGAL MEDICINE

## 2021-01-26 PROCEDURE — G8754 DIAS BP LESS 90: HCPCS | Performed by: LEGAL MEDICINE

## 2021-01-26 PROCEDURE — G8536 NO DOC ELDER MAL SCRN: HCPCS | Performed by: LEGAL MEDICINE

## 2021-01-26 PROCEDURE — G0463 HOSPITAL OUTPT CLINIC VISIT: HCPCS | Performed by: LEGAL MEDICINE

## 2021-01-26 PROCEDURE — G8427 DOCREV CUR MEDS BY ELIG CLIN: HCPCS | Performed by: LEGAL MEDICINE

## 2021-01-26 PROCEDURE — G8510 SCR DEP NEG, NO PLAN REQD: HCPCS | Performed by: LEGAL MEDICINE

## 2021-01-26 PROCEDURE — G8752 SYS BP LESS 140: HCPCS | Performed by: LEGAL MEDICINE

## 2021-01-26 PROCEDURE — G8419 CALC BMI OUT NRM PARAM NOF/U: HCPCS | Performed by: LEGAL MEDICINE

## 2021-01-26 PROCEDURE — 1101F PT FALLS ASSESS-DOCD LE1/YR: CPT | Performed by: LEGAL MEDICINE

## 2021-01-26 RX ORDER — HYDROCHLOROTHIAZIDE 12.5 MG/1
12.5 TABLET ORAL DAILY
Qty: 90 TAB | Refills: 1 | Status: SHIPPED | OUTPATIENT
Start: 2021-01-26 | End: 2021-08-25 | Stop reason: SDUPTHER

## 2021-01-26 RX ORDER — LOSARTAN POTASSIUM 100 MG/1
100 TABLET ORAL DAILY
Qty: 90 TAB | Refills: 1 | Status: SHIPPED | OUTPATIENT
Start: 2021-01-26 | End: 2021-08-25 | Stop reason: SDUPTHER

## 2021-01-26 RX ORDER — NIFEDIPINE 60 MG/1
60 TABLET, EXTENDED RELEASE ORAL DAILY
Qty: 90 TAB | Refills: 1 | Status: SHIPPED | OUTPATIENT
Start: 2021-01-26 | End: 2021-08-25 | Stop reason: SDUPTHER

## 2021-01-26 RX ORDER — OMEPRAZOLE 20 MG/1
20 CAPSULE, DELAYED RELEASE ORAL DAILY
Qty: 90 CAP | Refills: 1 | Status: SHIPPED | OUTPATIENT
Start: 2021-01-26 | End: 2021-08-25 | Stop reason: SDUPTHER

## 2021-01-26 NOTE — PROGRESS NOTES
Dominik Calderon is a 66 y.o. male (: 1945) presenting to address:    Chief Complaint   Patient presents with    Hypertension     follow up        Vitals:    21 1100   BP: 113/72   Pulse: 78   Resp: 16   Temp: 97.2 °F (36.2 °C)   TempSrc: Temporal   SpO2: 98%   Weight: 152 lb 12.8 oz (69.3 kg)   Height: 5' 3\" (1.6 m)   PainSc:   0 - No pain       Is someone accompanying this pt? NO    Is the patient using any DME equipment during OV? NO    Hearing/Vision:   No exam data present    Learning Assessment:     Learning Assessment 2020   PRIMARY LEARNER Patient   PRIMARY LANGUAGE ENGLISH   LEARNER PREFERENCE PRIMARY DEMONSTRATION     READING     VIDEOS     LISTENING     PICTURES   ANSWERED BY patient   RELATIONSHIP SELF     Depression Screening:     3 most recent PHQ Screens 2021   Little interest or pleasure in doing things Not at all   Feeling down, depressed, irritable, or hopeless Not at all   Total Score PHQ 2 0     Fall Risk Assessment:     Fall Risk Assessment, last 12 mths 2021   Able to walk? Yes   Fall in past 12 months? 0   Do you feel unsteady? 0   Are you worried about falling 0   Fall with injury? -     Coordination of Care Questionaire:   1. Have you been to the ER, urgent care clinic since your last visit? Hospitalized since your last visit? NO    2. Have you seen or consulted any other health care providers outside of the [address] [place] [address] since your last visit? Include any pap smears or colon screening. YES neurologist and oncology     Advanced Directive:   1. Do you have an Advanced Directive? NO    2. Would you like information on Advanced Directives?  NO

## 2021-01-26 NOTE — PROGRESS NOTES
Pneumovax 23 Immunization/s administered 1/26/2021 by Kallie Thompson LPN with guardian's consent. Patient tolerated procedure well. No reactions noted.

## 2021-01-26 NOTE — PROGRESS NOTES
Rossy Welch     Chief Complaint   Patient presents with    Hypertension     follow up      Vitals:    21 1100   BP: 113/72   Pulse: 78   Resp: 16   Temp: 97.2 °F (36.2 °C)   TempSrc: Temporal   SpO2: 98%   Weight: 152 lb 12.8 oz (69.3 kg)   Height: 5' 3\" (1.6 m)   PainSc:   0 - No pain         HPI:Patient is here for follow-up he has been doing well adherent with all medications    He recently followed up with oncologist and he brought a copy of blood work, blood work is within normal limit a copy will be scanned in his chart    Past Medical History:   Diagnosis Date    Arthritis     Cancer ([facility]     rectal    GERD (gastroesophageal reflux disease)     Hypercholesterolemia     Hypertension      No past surgical history on file. Social History     Tobacco Use    Smoking status: Former Smoker     Packs/day: 1.00     Quit date: 1983     Years since quittin.1    Smokeless tobacco: Never Used   Substance Use Topics    Alcohol use: No       Family History   Problem Relation Age of Onset    No Known Problems Mother     No Known Problems Father        Review of Systems   Constitutional: Negative for chills, fever, malaise/fatigue and weight loss. HENT: Negative for congestion, ear discharge, ear pain, hearing loss, nosebleeds, sinus pain and sore throat. Eyes: Negative for blurred vision, double vision and discharge. Respiratory: Negative for cough, hemoptysis, sputum production, shortness of breath and wheezing. Cardiovascular: Positive for leg swelling. Negative for chest pain, palpitations, orthopnea, claudication and PND. Gastrointestinal: Negative for abdominal pain, constipation, diarrhea, nausea and vomiting. Genitourinary: Negative for dysuria, flank pain, frequency, hematuria and urgency. Musculoskeletal: Negative for back pain, falls, joint pain, myalgias and neck pain. Skin: Negative for itching and rash.    Neurological: Negative for dizziness, tingling, sensory change, speech change, focal weakness, weakness and headaches. Psychiatric/Behavioral: Negative for depression and suicidal ideas. Physical Exam  Vitals signs and nursing note reviewed. Constitutional:       General: He is not in acute distress. Appearance: He is well-developed. He is not diaphoretic. HENT:      Head: Normocephalic and atraumatic. Eyes:      General: No scleral icterus. Right eye: No discharge. Left eye: No discharge. Conjunctiva/sclera: Conjunctivae normal.      Pupils: Pupils are equal, round, and reactive to light. Neck:      Musculoskeletal: Normal range of motion and neck supple. Thyroid: No thyromegaly. Cardiovascular:      Rate and Rhythm: Normal rate and regular rhythm. Heart sounds: Normal heart sounds. Pulmonary:      Effort: Pulmonary effort is normal. No respiratory distress. Breath sounds: Normal breath sounds. No rales. Abdominal:      General: Bowel sounds are normal. There is no distension. Palpations: Abdomen is soft. There is no mass. Tenderness: There is no abdominal tenderness. There is no rebound. Musculoskeletal: Normal range of motion. General: No tenderness or deformity. Right lower leg: Edema present. Left lower leg: Edema present. Lymphadenopathy:      Cervical: No cervical adenopathy. Skin:     General: Skin is warm and dry. Findings: No erythema or rash. Neurological:      Mental Status: He is alert and oriented to person, place, and time. Cranial Nerves: No cranial nerve deficit. Coordination: Coordination normal.   Psychiatric:         Thought Content: Thought content normal.         Judgment: Judgment normal.          Assessment and plan     Plan of care has been discussed with the patient, he agrees to the plan and verbalized understanding.   All his questions were answered  More than 50% of the time spent in this visit was counseling the patient about  illness and treatment options         1. Encounter for immunization  Vaccine was given no complications  - PNEUMOCOCCAL POLYSACCHARIDE VACCINE, 23-VALENT, ADULT OR IMMUNOSUPPRESSED PT DOSE,    2. Gastroesophageal reflux disease without esophagitis  Stable on omeprazole  - omeprazole (PRILOSEC) 20 mg capsule; Take 1 Cap by mouth daily. Dispense: 90 Cap; Refill: 1    3. Essential hypertension  Blood pressures well controlled patient is following up yearly  - NIFEdipine ER (ADALAT CC) 60 mg ER tablet; Take 1 Tab by mouth daily. Dispense: 90 Tab; Refill: 1  - hydroCHLOROthiazide (HYDRODIURIL) 12.5 mg tablet; Take 1 Tab by mouth daily. Dispense: 90 Tab; Refill: 1  - losartan (COZAAR) 100 mg tablet; Take 1 Tab by mouth daily. Dispense: 90 Tab; Refill: 1    4. Rectal cancer [facility])  Patient is following up once a year with his oncologist    5. Seizure [facility])  Patient has no recent episodes of seizures and he is stable on medications and following up with neurologist    6. Thrombocytopenia (HCC)  Resolved    7. Leg swelling  Possible dependent edema  No symptoms of congestive heart failure  To  consider echocardiogram to check ejection fraction    Current Outpatient Medications   Medication Sig Dispense Refill    omeprazole (PRILOSEC) 20 mg capsule Take 1 Cap by mouth daily. 90 Cap 1    NIFEdipine ER (ADALAT CC) 60 mg ER tablet Take 1 Tab by mouth daily. 90 Tab 1    hydroCHLOROthiazide (HYDRODIURIL) 12.5 mg tablet Take 1 Tab by mouth daily. 90 Tab 1    losartan (COZAAR) 100 mg tablet Take 1 Tab by mouth daily. 90 Tab 1    allopurinoL (ZYLOPRIM) 100 mg tablet Take 1 Tab by mouth daily. 90 Tab 1    atenoloL (Tenormin) 100 mg tablet Take 1 Tab by mouth daily. 90 Tab 1    simvastatin (Zocor) 40 mg tablet Take 1 Tab by mouth nightly. 90 Tab 1    ergocalciferol (ERGOCALCIFEROL) 1,250 mcg (50,000 unit) capsule Take 1 Cap by mouth every seven (7) days.  12 Cap 1    levETIRAcetam (KEPPRA) 250 mg tablet Take 250 mg by mouth two (2) times a day.  acetaminophen (TYLENOL EXTRA STRENGTH) 500 mg tablet Take  by mouth every six (6) hours as needed for Pain. Patient Active Problem List    Diagnosis Date Noted    Gastroesophageal reflux disease without esophagitis 01/04/2019    Essential hypertension 01/04/2019    Seizure ([address] 01/04/2019    Mixed hyperlipidemia 01/04/2019    Gout, arthritis 01/04/2019    CKD (chronic kidney disease) stage 3, GFR 30-59 ml/min 01/04/2019    Vitamin D deficiency 01/04/2019    History of rectal cancer 05/31/2013     Results for orders placed or performed during the hospital encounter of 98/50/02   METABOLIC PANEL, COMPREHENSIVE   Result Value Ref Range    Sodium 140 136 - 145 mmol/L    Potassium 4.4 3.5 - 5.5 mmol/L    Chloride 106 100 - 111 mmol/L    CO2 25 21 - 32 mmol/L    Anion gap 9 3.0 - 18 mmol/L    Glucose 104 (H) 74 - 99 mg/dL    BUN 25 (H) 7.0 - 18 MG/DL    Creatinine 1.66 (H) 0.6 - 1.3 MG/DL    BUN/Creatinine ratio 15 12 - 20      GFR est AA 49 (L) >60 ml/min/1.73m2    GFR est non-AA 40 (L) >60 ml/min/1.73m2    Calcium 9.1 8.5 - 10.1 MG/DL    Bilirubin, total 0.6 0.2 - 1.0 MG/DL    ALT (SGPT) 18 16 - 61 U/L    AST (SGOT) 17 10 - 38 U/L    Alk. phosphatase 98 45 - 117 U/L    Protein, total 7.2 6.4 - 8.2 g/dL    Albumin 4.0 3.4 - 5.0 g/dL    Globulin 3.2 2.0 - 4.0 g/dL    A-G Ratio 1.3 0.8 - 1.7     CBC WITH AUTOMATED DIFF   Result Value Ref Range    WBC 8.2 4.6 - 13.2 K/uL    RBC 4.43 (L) 4.70 - 5.50 M/uL    HGB 13.4 13.0 - 16.0 g/dL    HCT 39.8 36.0 - 48.0 %    MCV 89.8 74.0 - 97.0 FL    MCH 30.2 24.0 - 34.0 PG    MCHC 33.7 31.0 - 37.0 g/dL    RDW 13.6 11.6 - 14.5 %    PLATELET 937 799 - 566 K/uL    MPV 10.7 9.2 - 11.8 FL    NEUTROPHILS 66 40 - 73 %    LYMPHOCYTES 18 (L) 21 - 52 %    MONOCYTES 13 (H) 3 - 10 %    EOSINOPHILS 3 0 - 5 %    BASOPHILS 0 0 - 2 %    ABS. NEUTROPHILS 5.4 1.8 - 8.0 K/UL    ABS. LYMPHOCYTES 1.4 0.9 - 3.6 K/UL    ABS.  MONOCYTES 1.1 0.05 - 1.2 K/UL ABS. EOSINOPHILS 0.3 0.0 - 0.4 K/UL    ABS. BASOPHILS 0.0 0.0 - 0.1 K/UL    DF AUTOMATED       No visits with results within 3 Month(s) from this visit. Latest known visit with results is:   Hospital Outpatient Visit on 10/21/2020   Component Date Value Ref Range Status    Sodium 10/21/2020 140  136 - 145 mmol/L Final    Potassium 10/21/2020 4.4  3.5 - 5.5 mmol/L Final    Chloride 10/21/2020 106  100 - 111 mmol/L Final    CO2 10/21/2020 25  21 - 32 mmol/L Final    Anion gap 10/21/2020 9  3.0 - 18 mmol/L Final    Glucose 10/21/2020 104* 74 - 99 mg/dL Final    BUN 10/21/2020 25* 7.0 - 18 MG/DL Final    Creatinine 10/21/2020 1.66* 0.6 - 1.3 MG/DL Final    BUN/Creatinine ratio 10/21/2020 15  12 - 20   Final    GFR est AA 10/21/2020 49* >60 ml/min/1.73m2 Final    GFR est non-AA 10/21/2020 40* >60 ml/min/1.73m2 Final    Comment: (NOTE)  Estimated GFR is calculated using the Modification of Diet in Renal   Disease (MDRD) Study equation, reported for both  Americans   (GFRAA) and non- Americans (GFRNA), and normalized to 1.73m2   body surface area. The physician must decide which value applies to   the patient. The MDRD study equation should only be used in   individuals age 25 or older. It has not been validated for the   following: pregnant women, patients with serious comorbid conditions,   or on certain medications, or persons with extremes of body size,   muscle mass, or nutritional status.  Calcium 10/21/2020 9.1  8.5 - 10.1 MG/DL Final    Bilirubin, total 10/21/2020 0.6  0.2 - 1.0 MG/DL Final    ALT (SGPT) 10/21/2020 18  16 - 61 U/L Final    AST (SGOT) 10/21/2020 17  10 - 38 U/L Final    Alk.  phosphatase 10/21/2020 98  45 - 117 U/L Final    Protein, total 10/21/2020 7.2  6.4 - 8.2 g/dL Final    Albumin 10/21/2020 4.0  3.4 - 5.0 g/dL Final    Globulin 10/21/2020 3.2  2.0 - 4.0 g/dL Final    A-G Ratio 10/21/2020 1.3  0.8 - 1.7   Final    WBC 10/21/2020 8.2  4.6 - 13.2 K/uL Final  RBC 10/21/2020 4.43* 4.70 - 5.50 M/uL Final    HGB 10/21/2020 13.4  13.0 - 16.0 g/dL Final    HCT 10/21/2020 39.8  36.0 - 48.0 % Final    MCV 10/21/2020 89.8  74.0 - 97.0 FL Final    MCH 10/21/2020 30.2  24.0 - 34.0 PG Final    MCHC 10/21/2020 33.7  31.0 - 37.0 g/dL Final    RDW 10/21/2020 13.6  11.6 - 14.5 % Final    PLATELET 67/99/2278 250  135 - 420 K/uL Final    MPV 10/21/2020 10.7  9.2 - 11.8 FL Final    NEUTROPHILS 10/21/2020 66  40 - 73 % Final    LYMPHOCYTES 10/21/2020 18* 21 - 52 % Final    MONOCYTES 10/21/2020 13* 3 - 10 % Final    EOSINOPHILS 10/21/2020 3  0 - 5 % Final    BASOPHILS 10/21/2020 0  0 - 2 % Final    ABS. NEUTROPHILS 10/21/2020 5.4  1.8 - 8.0 K/UL Final    ABS. LYMPHOCYTES 10/21/2020 1.4  0.9 - 3.6 K/UL Final    ABS. MONOCYTES 10/21/2020 1.1  0.05 - 1.2 K/UL Final    ABS. EOSINOPHILS 10/21/2020 0.3  0.0 - 0.4 K/UL Final    ABS.  BASOPHILS 10/21/2020 0.0  0.0 - 0.1 K/UL Final    DF 10/21/2020 AUTOMATED    Final

## 2021-06-30 DIAGNOSIS — E78.00 HIGH CHOLESTEROL: ICD-10-CM

## 2021-06-30 DIAGNOSIS — M10.00 IDIOPATHIC GOUT, UNSPECIFIED CHRONICITY, UNSPECIFIED SITE: ICD-10-CM

## 2021-06-30 DIAGNOSIS — E55.9 VITAMIN D DEFICIENCY: ICD-10-CM

## 2021-06-30 DIAGNOSIS — I10 ESSENTIAL HYPERTENSION: ICD-10-CM

## 2021-07-01 RX ORDER — ATENOLOL 100 MG/1
100 TABLET ORAL DAILY
Qty: 90 TABLET | Refills: 0 | Status: SHIPPED | OUTPATIENT
Start: 2021-07-01 | End: 2021-10-05 | Stop reason: SDUPTHER

## 2021-07-01 RX ORDER — ALLOPURINOL 100 MG/1
100 TABLET ORAL DAILY
Qty: 90 TABLET | Refills: 0 | Status: SHIPPED | OUTPATIENT
Start: 2021-07-01 | End: 2021-10-05 | Stop reason: SDUPTHER

## 2021-07-01 RX ORDER — ERGOCALCIFEROL 1.25 MG/1
50000 CAPSULE ORAL
Qty: 12 CAPSULE | Refills: 0 | Status: SHIPPED | OUTPATIENT
Start: 2021-07-01 | End: 2021-10-05 | Stop reason: SDUPTHER

## 2021-07-01 RX ORDER — SIMVASTATIN 40 MG/1
40 TABLET, FILM COATED ORAL
Qty: 90 TABLET | Refills: 0 | Status: SHIPPED | OUTPATIENT
Start: 2021-07-01 | End: 2021-10-05 | Stop reason: SDUPTHER

## 2021-07-01 NOTE — TELEPHONE ENCOUNTER
Last refills were 01/04/2021   No future appointments. Last appointment was 01/26/2021  Over due for a follow up.

## 2021-07-01 NOTE — TELEPHONE ENCOUNTER
Orders Placed This Encounter    atenoloL (Tenormin) 100 mg tablet     Sig: Take 1 Tablet by mouth daily. Dispense:  90 Tablet     Refill:  0     On behalf of Dr. Fawn Urban. Needs appt for further refills.  allopurinoL (ZYLOPRIM) 100 mg tablet     Sig: Take 1 Tablet by mouth daily. Dispense:  90 Tablet     Refill:  0     On behalf of Dr. Fawn Urban. Needs appt for further refills.  simvastatin (Zocor) 40 mg tablet     Sig: Take 1 Tablet by mouth nightly. On behalf of Dr. Fawn Urban. Needs appt for further refills. Dispense:  90 Tablet     Refill:  0    ergocalciferol (ERGOCALCIFEROL) 1,250 mcg (50,000 unit) capsule     Sig: Take 1 Capsule by mouth every seven (7) days. Dispense:  12 Capsule     Refill:  0     On behalf of Dr. Fawn Urban. Needs appt for further refills.

## 2021-07-30 ENCOUNTER — HOSPITAL ENCOUNTER (OUTPATIENT)
Dept: LAB | Age: 76
Discharge: HOME | End: 2021-07-30
Payer: MEDICARE

## 2021-07-30 ENCOUNTER — APPOINTMENT (OUTPATIENT)
Dept: FAMILY MEDICINE CLINIC | Age: 76
End: 2021-07-30

## 2021-07-30 ENCOUNTER — OFFICE VISIT (OUTPATIENT)
Dept: FAMILY MEDICINE CLINIC | Age: 76
End: 2021-07-30
Payer: MEDICARE

## 2021-07-30 VITALS
HEIGHT: 63 IN | OXYGEN SATURATION: 99 % | HEART RATE: 75 BPM | DIASTOLIC BLOOD PRESSURE: 77 MMHG | SYSTOLIC BLOOD PRESSURE: 124 MMHG | RESPIRATION RATE: 16 BRPM | BODY MASS INDEX: 27.11 KG/M2 | TEMPERATURE: 97.2 F | WEIGHT: 153 LBS

## 2021-07-30 DIAGNOSIS — Z11.59 NEED FOR HEPATITIS C SCREENING TEST: ICD-10-CM

## 2021-07-30 DIAGNOSIS — M10.00 IDIOPATHIC GOUT, UNSPECIFIED CHRONICITY, UNSPECIFIED SITE: ICD-10-CM

## 2021-07-30 DIAGNOSIS — I10 ESSENTIAL HYPERTENSION: ICD-10-CM

## 2021-07-30 DIAGNOSIS — C20 RECTAL CANCER (HCC): ICD-10-CM

## 2021-07-30 DIAGNOSIS — R60.0 LOWER LEG EDEMA: ICD-10-CM

## 2021-07-30 DIAGNOSIS — N18.31 STAGE 3A CHRONIC KIDNEY DISEASE (HCC): ICD-10-CM

## 2021-07-30 DIAGNOSIS — E78.00 HIGH CHOLESTEROL: ICD-10-CM

## 2021-07-30 DIAGNOSIS — Z12.5 SCREENING FOR MALIGNANT NEOPLASM OF PROSTATE: ICD-10-CM

## 2021-07-30 DIAGNOSIS — I10 ESSENTIAL HYPERTENSION: Primary | ICD-10-CM

## 2021-07-30 LAB
ALBUMIN SERPL-MCNC: 4.1 G/DL (ref 3.4–5)
ALBUMIN/GLOB SERPL: 1.1 {RATIO} (ref 0.8–1.7)
ALP SERPL-CCNC: 106 U/L (ref 45–117)
ALT SERPL-CCNC: 24 U/L (ref 16–61)
ANION GAP SERPL CALC-SCNC: 7 MMOL/L (ref 3–18)
AST SERPL-CCNC: 15 U/L (ref 10–38)
BASOPHILS # BLD: 0 K/UL (ref 0–0.1)
BASOPHILS NFR BLD: 0 % (ref 0–2)
BILIRUB SERPL-MCNC: 0.5 MG/DL (ref 0.2–1)
BUN SERPL-MCNC: 25 MG/DL (ref 7–18)
BUN/CREAT SERPL: 15 (ref 12–20)
CALCIUM SERPL-MCNC: 9.2 MG/DL (ref 8.5–10.1)
CHLORIDE SERPL-SCNC: 106 MMOL/L (ref 100–111)
CHOLEST SERPL-MCNC: 148 MG/DL
CO2 SERPL-SCNC: 26 MMOL/L (ref 21–32)
CREAT SERPL-MCNC: 1.64 MG/DL (ref 0.6–1.3)
DIFFERENTIAL METHOD BLD: ABNORMAL
EOSINOPHIL # BLD: 0.3 K/UL (ref 0–0.4)
EOSINOPHIL NFR BLD: 4 % (ref 0–5)
ERYTHROCYTE [DISTWIDTH] IN BLOOD BY AUTOMATED COUNT: 14 % (ref 11.6–14.5)
GLOBULIN SER CALC-MCNC: 3.8 G/DL (ref 2–4)
GLUCOSE SERPL-MCNC: 107 MG/DL (ref 74–99)
HCT VFR BLD AUTO: 41.5 % (ref 36–48)
HDLC SERPL-MCNC: 38 MG/DL (ref 40–60)
HDLC SERPL: 3.9 {RATIO} (ref 0–5)
HGB BLD-MCNC: 13.5 G/DL (ref 13–16)
LDLC SERPL CALC-MCNC: 44.6 MG/DL (ref 0–100)
LIPID PROFILE,FLP: ABNORMAL
LYMPHOCYTES # BLD: 0.9 K/UL (ref 0.9–3.6)
LYMPHOCYTES NFR BLD: 10 % (ref 21–52)
MCH RBC QN AUTO: 29.8 PG (ref 24–34)
MCHC RBC AUTO-ENTMCNC: 32.5 G/DL (ref 31–37)
MCV RBC AUTO: 91.6 FL (ref 74–97)
MONOCYTES # BLD: 0.9 K/UL (ref 0.05–1.2)
MONOCYTES NFR BLD: 10 % (ref 3–10)
NEUTS SEG # BLD: 6.8 K/UL (ref 1.8–8)
NEUTS SEG NFR BLD: 76 % (ref 40–73)
PLATELET # BLD AUTO: 260 K/UL (ref 135–420)
PMV BLD AUTO: 10.5 FL (ref 9.2–11.8)
POTASSIUM SERPL-SCNC: 4.5 MMOL/L (ref 3.5–5.5)
PROT SERPL-MCNC: 7.9 G/DL (ref 6.4–8.2)
PSA SERPL-MCNC: 2.1 NG/ML (ref 0–4)
RBC # BLD AUTO: 4.53 M/UL (ref 4.35–5.65)
SODIUM SERPL-SCNC: 139 MMOL/L (ref 136–145)
TRIGL SERPL-MCNC: 327 MG/DL (ref ?–150)
URATE SERPL-MCNC: 6.8 MG/DL (ref 2.6–7.2)
VLDLC SERPL CALC-MCNC: 65.4 MG/DL
WBC # BLD AUTO: 9 K/UL (ref 4.6–13.2)

## 2021-07-30 PROCEDURE — 84153 ASSAY OF PSA TOTAL: CPT

## 2021-07-30 PROCEDURE — G8754 DIAS BP LESS 90: HCPCS | Performed by: LEGAL MEDICINE

## 2021-07-30 PROCEDURE — 84550 ASSAY OF BLOOD/URIC ACID: CPT

## 2021-07-30 PROCEDURE — 86803 HEPATITIS C AB TEST: CPT

## 2021-07-30 PROCEDURE — G8536 NO DOC ELDER MAL SCRN: HCPCS | Performed by: LEGAL MEDICINE

## 2021-07-30 PROCEDURE — 85025 COMPLETE CBC W/AUTO DIFF WBC: CPT

## 2021-07-30 PROCEDURE — 80061 LIPID PANEL: CPT

## 2021-07-30 PROCEDURE — 36415 COLL VENOUS BLD VENIPUNCTURE: CPT

## 2021-07-30 PROCEDURE — 1101F PT FALLS ASSESS-DOCD LE1/YR: CPT | Performed by: LEGAL MEDICINE

## 2021-07-30 PROCEDURE — 80053 COMPREHEN METABOLIC PANEL: CPT

## 2021-07-30 PROCEDURE — G8752 SYS BP LESS 140: HCPCS | Performed by: LEGAL MEDICINE

## 2021-07-30 PROCEDURE — G8427 DOCREV CUR MEDS BY ELIG CLIN: HCPCS | Performed by: LEGAL MEDICINE

## 2021-07-30 PROCEDURE — G8510 SCR DEP NEG, NO PLAN REQD: HCPCS | Performed by: LEGAL MEDICINE

## 2021-07-30 PROCEDURE — 99214 OFFICE O/P EST MOD 30 MIN: CPT | Performed by: LEGAL MEDICINE

## 2021-07-30 PROCEDURE — G8419 CALC BMI OUT NRM PARAM NOF/U: HCPCS | Performed by: LEGAL MEDICINE

## 2021-07-30 NOTE — PROGRESS NOTES
Simi Peterson is a 64 y.o. male (: 1945) presenting to address:    Chief Complaint   Patient presents with    Hypertension     follow up       Vitals:    21 1351   BP: 124/77   Pulse: 75   Resp: 16   Temp: 97.2 °F (36.2 °C)   TempSrc: Temporal   SpO2: 99%   Weight: 153 lb (69.4 kg)   Height: 5' 3\" (1.6 m)   PainSc:   0 - No pain       Hearing/Vision:   No exam data present    Learning Assessment:     Learning Assessment 2020   PRIMARY LEARNER Patient   PRIMARY LANGUAGE ENGLISH   LEARNER PREFERENCE PRIMARY DEMONSTRATION     READING     VIDEOS     LISTENING     PICTURES   ANSWERED BY patient   RELATIONSHIP SELF     Depression Screening:     3 most recent PHQ Screens 2021   Little interest or pleasure in doing things Not at all   Feeling down, depressed, irritable, or hopeless Not at all   Total Score PHQ 2 0     Fall Risk Assessment:     Fall Risk Assessment, last 12 mths 2021   Able to walk? Yes   Fall in past 12 months? 0   Do you feel unsteady? -   Are you worried about falling -   Fall with injury? -     Abuse Screening:     Abuse Screening Questionnaire 2020   Do you ever feel afraid of your partner? N   Are you in a relationship with someone who physically or mentally threatens you? N   Is it safe for you to go home? Y     Coordination of Care Questionaire:   1. Have you been to the ER, urgent care clinic since your last visit? Hospitalized since your last visit? NO    2. Have you seen or consulted any other health care providers outside of the [address] [place] [address] since your last visit? Include any pap smears or colon screening. NO    Advanced Directive:   1. Do you have an Advanced Directive? NO    2. Would you like information on Advanced Directives?  NO

## 2021-07-30 NOTE — PROGRESS NOTES
Wilfredo Rondaabida 42     Chief Complaint   Patient presents with    Hypertension     follow up     Vitals:    21 1351   BP: 124/77   Pulse: 75   Resp: 16   Temp: 97.2 °F (36.2 °C)   TempSrc: Temporal   SpO2: 99%   Weight: 153 lb (69.4 kg)   Height: 5' 3\" (1.6 m)   PainSc:   0 - No pain         HPI: Patient is here for follow-up, he has been stable no acute changes, he has bilateral lower extremity swelling, and he complained about fatigue and tiredness, an echo was ordered but he has been canceled. Patient need echocardiogram cardiology evaluation he had seen Kelly Buenrostro        Past Medical History:   Diagnosis Date    Arthritis     Cancer ([facility] [address]     rectal    GERD (gastroesophageal reflux disease)     Hypercholesterolemia     Hypertension      No past surgical history on file. Social History     Tobacco Use    Smoking status: Former Smoker     Packs/day: 1.00     Quit date: 1983     Years since quittin.6    Smokeless tobacco: Never Used   Substance Use Topics    Alcohol use: No       Family History   Problem Relation Age of Onset    No Known Problems Mother     No Known Problems Father        Review of Systems   Constitutional: Positive for malaise/fatigue. Negative for chills, fever and weight loss. HENT: Negative for congestion, ear discharge, ear pain, hearing loss, nosebleeds, sinus pain and sore throat. Eyes: Negative for blurred vision, double vision and discharge. Respiratory: Negative for cough, hemoptysis, sputum production, shortness of breath, wheezing and stridor. Cardiovascular: Positive for leg swelling. Negative for chest pain, palpitations and claudication. Gastrointestinal: Positive for constipation. Negative for abdominal pain, blood in stool, diarrhea, melena, nausea and vomiting. Genitourinary: Negative for dysuria, flank pain, frequency, hematuria and urgency. Musculoskeletal: Positive for joint pain.  Negative for back pain, myalgias and neck pain. Skin: Negative for itching and rash. Neurological: Negative for dizziness, tingling, sensory change, speech change, focal weakness, seizures, loss of consciousness, weakness and headaches. Psychiatric/Behavioral: Negative for depression, hallucinations, memory loss, substance abuse and suicidal ideas. The patient is not nervous/anxious and does not have insomnia. Physical Exam  Vitals and nursing note reviewed. Constitutional:       General: He is not in acute distress. Appearance: He is well-developed. He is not diaphoretic. HENT:      Head: Normocephalic and atraumatic. Eyes:      General: No scleral icterus. Right eye: No discharge. Left eye: No discharge. Conjunctiva/sclera: Conjunctivae normal.   Neck:      Thyroid: No thyromegaly. Cardiovascular:      Rate and Rhythm: Normal rate and regular rhythm. Heart sounds: Normal heart sounds. Pulmonary:      Effort: Pulmonary effort is normal. No respiratory distress. Breath sounds: Normal breath sounds. No rales. Abdominal:      General: Bowel sounds are normal. There is no distension. Palpations: Abdomen is soft. There is no mass. Tenderness: There is no abdominal tenderness. There is no rebound. Musculoskeletal:         General: No tenderness or deformity. Normal range of motion. Cervical back: Normal range of motion and neck supple. Lymphadenopathy:      Cervical: No cervical adenopathy. Skin:     General: Skin is warm and dry. Findings: No erythema or rash. Neurological:      Mental Status: He is alert and oriented to person, place, and time. Cranial Nerves: No cranial nerve deficit. Coordination: Coordination normal.   Psychiatric:         Thought Content: Thought content normal.         Judgment: Judgment normal.          Assessment and plan     Plan of care has been discussed with the patient, he agrees to the plan and verbalized understanding.   All his questions were answered  More than 50% of the time spent in this visit was counseling the patient about  illness and treatment options         1. Essential hypertension  Blood pressure is well controlled on current medications  - METABOLIC PANEL, COMPREHENSIVE; Future  - LIPID PANEL; Future  - CBC WITH AUTOMATED DIFF; Future  - - REFERRAL TO CARDIOLOGY    2. Idiopathic gout, unspecified chronicity, unspecified site  Stable no recent exacerbations  - CBC WITH AUTOMATED DIFF; Future  - URIC ACID; Future    3. High cholesterol  Patient is adherent to statins  - CBC WITH AUTOMATED DIFF; Future    4. Rectal cancer [facility])  Following up with oncology once. Year    5. Screening for malignant neoplasm of prostate    - PSA SCREENING (SCREENING); Future    6. Need for hepatitis C screening test    - HEPATITIS C AB; Future    7. Stage 3a chronic kidney disease (HCC)  Reevaluate EGFR  8. Lower leg edema    - - REFERRAL TO CARDIOLOGY    Current Outpatient Medications   Medication Sig Dispense Refill    atenoloL (Tenormin) 100 mg tablet Take 1 Tablet by mouth daily. 90 Tablet 0    allopurinoL (ZYLOPRIM) 100 mg tablet Take 1 Tablet by mouth daily. 90 Tablet 0    simvastatin (Zocor) 40 mg tablet Take 1 Tablet by mouth nightly. On behalf of Dr. Deyanira Carl. Needs appt for further refills. 90 Tablet 0    ergocalciferol (ERGOCALCIFEROL) 1,250 mcg (50,000 unit) capsule Take 1 Capsule by mouth every seven (7) days. 12 Capsule 0    omeprazole (PRILOSEC) 20 mg capsule Take 1 Cap by mouth daily. 90 Cap 1    NIFEdipine ER (ADALAT CC) 60 mg ER tablet Take 1 Tab by mouth daily. 90 Tab 1    hydroCHLOROthiazide (HYDRODIURIL) 12.5 mg tablet Take 1 Tab by mouth daily. 90 Tab 1    losartan (COZAAR) 100 mg tablet Take 1 Tab by mouth daily. 90 Tab 1    levETIRAcetam (KEPPRA) 250 mg tablet Take 250 mg by mouth two (2) times a day.  acetaminophen (TYLENOL EXTRA STRENGTH) 500 mg tablet Take  by mouth every six (6) hours as needed for Pain. Patient Active Problem List    Diagnosis Date Noted    Gastroesophageal reflux disease without esophagitis 01/04/2019    Essential hypertension 01/04/2019    Seizure ([facility] [address] 01/04/2019    Mixed hyperlipidemia 01/04/2019    Gout, arthritis 01/04/2019    CKD (chronic kidney disease) stage 3, GFR 30-59 ml/min ([facility]) 01/04/2019    Vitamin D deficiency 01/04/2019    History of rectal cancer 05/31/2013     Results for orders placed or performed in visit on 03/19/21   AMB EXT CREATININE   Result Value Ref Range    Creatinine, External 1.650      No visits with results within 3 Month(s) from this visit. Latest known visit with results is:   Abstract on 03/19/2021   Component Date Value Ref Range Status    Creatinine, External 01/06/2021 1.650   Final          Follow-up and Dispositions    · Return in about 3 months (around 10/30/2021) for for medicare wellness.

## 2021-08-03 LAB
HCV AB SER IA-ACNC: <0.02 INDEX
HCV AB SERPL QL IA: NEGATIVE
HCV COMMENT,HCGAC: NORMAL

## 2021-08-25 DIAGNOSIS — K21.9 GASTROESOPHAGEAL REFLUX DISEASE WITHOUT ESOPHAGITIS: ICD-10-CM

## 2021-08-25 DIAGNOSIS — I10 ESSENTIAL HYPERTENSION: ICD-10-CM

## 2021-08-25 RX ORDER — HYDROCHLOROTHIAZIDE 12.5 MG/1
12.5 TABLET ORAL DAILY
Qty: 90 TABLET | Refills: 1 | Status: SHIPPED | OUTPATIENT
Start: 2021-08-25 | End: 2022-02-11 | Stop reason: SDUPTHER

## 2021-08-25 RX ORDER — NIFEDIPINE 60 MG/1
60 TABLET, EXTENDED RELEASE ORAL DAILY
Qty: 90 TABLET | Refills: 1 | Status: SHIPPED | OUTPATIENT
Start: 2021-08-25 | End: 2022-02-11 | Stop reason: SINTOL

## 2021-08-25 RX ORDER — OMEPRAZOLE 20 MG/1
20 CAPSULE, DELAYED RELEASE ORAL DAILY
Qty: 90 CAPSULE | Refills: 1 | Status: SHIPPED | OUTPATIENT
Start: 2021-08-25 | End: 2022-02-11 | Stop reason: SDUPTHER

## 2021-08-25 RX ORDER — LOSARTAN POTASSIUM 100 MG/1
100 TABLET ORAL DAILY
Qty: 90 TABLET | Refills: 1 | Status: SHIPPED | OUTPATIENT
Start: 2021-08-25 | End: 2022-02-11 | Stop reason: SDUPTHER

## 2021-08-25 NOTE — TELEPHONE ENCOUNTER
Patient called requesting medication refill, please advise. Requested Prescriptions     Pending Prescriptions Disp Refills    omeprazole (PRILOSEC) 20 mg capsule 90 Capsule 1     Sig: Take 1 Capsule by mouth daily.  hydroCHLOROthiazide (HYDRODIURIL) 12.5 mg tablet 90 Tablet 1     Sig: Take 1 Tablet by mouth daily.  losartan (COZAAR) 100 mg tablet 90 Tablet 1     Sig: Take 1 Tablet by mouth daily.  NIFEdipine ER (ADALAT CC) 60 mg ER tablet 90 Tablet 1     Sig: Take 1 Tablet by mouth daily.

## 2021-10-05 DIAGNOSIS — M10.00 IDIOPATHIC GOUT, UNSPECIFIED CHRONICITY, UNSPECIFIED SITE: ICD-10-CM

## 2021-10-05 DIAGNOSIS — I10 ESSENTIAL HYPERTENSION: ICD-10-CM

## 2021-10-05 DIAGNOSIS — E78.00 HIGH CHOLESTEROL: ICD-10-CM

## 2021-10-05 DIAGNOSIS — E55.9 VITAMIN D DEFICIENCY: ICD-10-CM

## 2021-10-05 NOTE — TELEPHONE ENCOUNTER
Requested Prescriptions     Pending Prescriptions Disp Refills    atenoloL (Tenormin) 100 mg tablet 90 Tablet 0     Sig: Take 1 Tablet by mouth daily.  allopurinoL (ZYLOPRIM) 100 mg tablet 90 Tablet 0     Sig: Take 1 Tablet by mouth daily.  simvastatin (Zocor) 40 mg tablet 90 Tablet 0     Sig: Take 1 Tablet by mouth nightly. On behalf of Dr. Adolfo Guerrero. Needs appt for further refills.  ergocalciferol (ERGOCALCIFEROL) 1,250 mcg (50,000 unit) capsule 12 Capsule 0     Sig: Take 1 Capsule by mouth every seven (7) days. Pt called to request a refill. Please advise. No future appointments.

## 2021-10-06 RX ORDER — ATENOLOL 100 MG/1
100 TABLET ORAL DAILY
Qty: 90 TABLET | Refills: 0 | Status: SHIPPED | OUTPATIENT
Start: 2021-10-06 | End: 2022-01-03 | Stop reason: SDUPTHER

## 2021-10-06 RX ORDER — ERGOCALCIFEROL 1.25 MG/1
50000 CAPSULE ORAL
Qty: 12 CAPSULE | Refills: 0 | Status: SHIPPED | OUTPATIENT
Start: 2021-10-06 | End: 2022-01-03 | Stop reason: SDUPTHER

## 2021-10-06 RX ORDER — ALLOPURINOL 100 MG/1
100 TABLET ORAL DAILY
Qty: 90 TABLET | Refills: 0 | Status: SHIPPED | OUTPATIENT
Start: 2021-10-06 | End: 2022-01-03 | Stop reason: SDUPTHER

## 2021-10-06 RX ORDER — SIMVASTATIN 40 MG/1
40 TABLET, FILM COATED ORAL
Qty: 90 TABLET | Refills: 0 | Status: SHIPPED | OUTPATIENT
Start: 2021-10-06 | End: 2022-01-03 | Stop reason: SDUPTHER

## 2022-01-03 DIAGNOSIS — E55.9 VITAMIN D DEFICIENCY: ICD-10-CM

## 2022-01-03 DIAGNOSIS — I10 ESSENTIAL HYPERTENSION: ICD-10-CM

## 2022-01-03 DIAGNOSIS — M10.00 IDIOPATHIC GOUT, UNSPECIFIED CHRONICITY, UNSPECIFIED SITE: ICD-10-CM

## 2022-01-03 DIAGNOSIS — E78.00 HIGH CHOLESTEROL: ICD-10-CM

## 2022-01-03 RX ORDER — ALLOPURINOL 100 MG/1
100 TABLET ORAL DAILY
Qty: 90 TABLET | Refills: 0 | Status: SHIPPED | OUTPATIENT
Start: 2022-01-03 | End: 2022-02-11 | Stop reason: SDUPTHER

## 2022-01-03 RX ORDER — SIMVASTATIN 40 MG/1
40 TABLET, FILM COATED ORAL
Qty: 90 TABLET | Refills: 0 | Status: SHIPPED | OUTPATIENT
Start: 2022-01-03 | End: 2022-02-11 | Stop reason: SDUPTHER

## 2022-01-03 RX ORDER — ERGOCALCIFEROL 1.25 MG/1
50000 CAPSULE ORAL
Qty: 12 CAPSULE | Refills: 0 | Status: SHIPPED | OUTPATIENT
Start: 2022-01-03 | End: 2022-02-11 | Stop reason: SDUPTHER

## 2022-01-03 RX ORDER — ATENOLOL 100 MG/1
100 TABLET ORAL DAILY
Qty: 90 TABLET | Refills: 0 | Status: SHIPPED
Start: 2022-01-03 | End: 2022-02-11 | Stop reason: DRUGHIGH

## 2022-01-03 NOTE — TELEPHONE ENCOUNTER
Patient requesting medication refills. Requested Prescriptions     Pending Prescriptions Disp Refills    simvastatin (Zocor) 40 mg tablet 90 Tablet 0     Sig: Take 1 Tablet by mouth nightly. On behalf of Dr. Radha Pagan. Needs appt for further refills.  atenoloL (Tenormin) 100 mg tablet 90 Tablet 0     Sig: Take 1 Tablet by mouth daily.  allopurinoL (ZYLOPRIM) 100 mg tablet 90 Tablet 0     Sig: Take 1 Tablet by mouth daily.  ergocalciferol (ERGOCALCIFEROL) 1,250 mcg (50,000 unit) capsule 12 Capsule 0     Sig: Take 1 Capsule by mouth every seven (7) days.      Future Appointments   Date Time Provider Humberto Babita   1/11/2022  3:30 PM Catherine Yi MD [facility] BS AMB     Last office visit 07/30/21

## 2022-01-11 ENCOUNTER — OFFICE VISIT (OUTPATIENT)
Dept: FAMILY MEDICINE CLINIC | Age: 77
End: 2022-01-11
Payer: MEDICARE

## 2022-01-11 VITALS
HEIGHT: 63 IN | WEIGHT: 146.6 LBS | RESPIRATION RATE: 16 BRPM | DIASTOLIC BLOOD PRESSURE: 76 MMHG | TEMPERATURE: 97.2 F | SYSTOLIC BLOOD PRESSURE: 128 MMHG | OXYGEN SATURATION: 97 % | BODY MASS INDEX: 25.98 KG/M2 | HEART RATE: 75 BPM

## 2022-01-11 DIAGNOSIS — R56.9 SEIZURE (HCC): ICD-10-CM

## 2022-01-11 DIAGNOSIS — Z13.5 SCREENING FOR GLAUCOMA: ICD-10-CM

## 2022-01-11 DIAGNOSIS — R42 DIZZINESS: Primary | ICD-10-CM

## 2022-01-11 DIAGNOSIS — N18.31 STAGE 3A CHRONIC KIDNEY DISEASE (HCC): ICD-10-CM

## 2022-01-11 DIAGNOSIS — C20 RECTAL CANCER (HCC): ICD-10-CM

## 2022-01-11 DIAGNOSIS — D69.6 THROMBOCYTOPENIA (HCC): ICD-10-CM

## 2022-01-11 DIAGNOSIS — I95.1 ORTHOSTATIC HYPOTENSION: ICD-10-CM

## 2022-01-11 PROCEDURE — G8536 NO DOC ELDER MAL SCRN: HCPCS | Performed by: LEGAL MEDICINE

## 2022-01-11 PROCEDURE — G8752 SYS BP LESS 140: HCPCS | Performed by: LEGAL MEDICINE

## 2022-01-11 PROCEDURE — G8510 SCR DEP NEG, NO PLAN REQD: HCPCS | Performed by: LEGAL MEDICINE

## 2022-01-11 PROCEDURE — G8754 DIAS BP LESS 90: HCPCS | Performed by: LEGAL MEDICINE

## 2022-01-11 PROCEDURE — 1101F PT FALLS ASSESS-DOCD LE1/YR: CPT | Performed by: LEGAL MEDICINE

## 2022-01-11 PROCEDURE — 99214 OFFICE O/P EST MOD 30 MIN: CPT | Performed by: LEGAL MEDICINE

## 2022-01-11 PROCEDURE — G8419 CALC BMI OUT NRM PARAM NOF/U: HCPCS | Performed by: LEGAL MEDICINE

## 2022-01-11 PROCEDURE — G8427 DOCREV CUR MEDS BY ELIG CLIN: HCPCS | Performed by: LEGAL MEDICINE

## 2022-01-11 RX ORDER — NIFEDIPINE 60 MG/1
60 TABLET, EXTENDED RELEASE ORAL
COMMUNITY
Start: 2021-01-26 | End: 2022-02-11 | Stop reason: SDUPTHER

## 2022-01-11 NOTE — PROGRESS NOTES
Visit Vitals  /76 (BP 1 Location: Right arm, BP Patient Position: Sitting, BP Cuff Size: Large adult)   Pulse 75   Temp 97.2 °F (36.2 °C) (Temporal)   Resp 16   Ht 5' 3\" (1.6 m)   Wt 146 lb 9.6 oz (66.5 kg)   SpO2 97%   BMI 25.97 kg/m²

## 2022-01-11 NOTE — PROGRESS NOTES
Wilfredo Nunez 42     Chief Complaint   Patient presents with    Dizziness     started 3 wks ago     Vitals:    22 1517 22 1557 22 1558   BP: 128/76     Pulse: 75     Resp: 16     Temp: 97.2 °F (36.2 °C)     TempSrc: Temporal     SpO2: 99% 98% 97%   Weight: 146 lb 9.6 oz (66.5 kg)     Height: 5' 3\" (1.6 m)     PainSc:   0 - No pain           HPI: Mr. Tee Sarah  has been with  Dizziness , he feel lightheaded when he stand up quickly, no fall no loss of consciousness  , denies any vertigo no nausea no vomiting associated with the symptoms. Orthostatic blood pressure being done and systolic blood pressure dropped more than 15 units when he was changed from laying down to standing and he felt dizzy at that time      Past Medical History:   Diagnosis Date    Arthritis     Cancer ([facility] [address]     rectal    GERD (gastroesophageal reflux disease)     Hypercholesterolemia     Hypertension      No past surgical history on file. Social History     Tobacco Use    Smoking status: Former Smoker     Packs/day: 1.00     Quit date: 1983     Years since quittin.0    Smokeless tobacco: Never Used   Substance Use Topics    Alcohol use: No       Family History   Problem Relation Age of Onset    No Known Problems Mother     No Known Problems Father        Review of Systems   Constitutional: Negative for chills, fever, malaise/fatigue and weight loss. HENT: Negative for congestion, ear discharge, ear pain, hearing loss and nosebleeds. Eyes: Negative for blurred vision, double vision and discharge. Respiratory: Negative for cough. Cardiovascular: Negative for chest pain, palpitations, claudication and leg swelling. Gastrointestinal: Negative for abdominal pain, constipation, diarrhea, nausea and vomiting. Genitourinary: Negative for dysuria, frequency and urgency. Musculoskeletal: Negative for myalgias. Skin: Negative for itching and rash. Neurological: Positive for dizziness. Negative for tingling, sensory change, speech change, focal weakness, weakness and headaches. Psychiatric/Behavioral: Negative for depression and suicidal ideas. Physical Exam  Vitals and nursing note reviewed. Constitutional:       General: He is not in acute distress. Appearance: He is well-developed. He is not diaphoretic. HENT:      Head: Normocephalic and atraumatic. Eyes:      General: No scleral icterus. Right eye: No discharge. Left eye: No discharge. Conjunctiva/sclera: Conjunctivae normal.   Neck:      Thyroid: No thyromegaly. Cardiovascular:      Rate and Rhythm: Normal rate and regular rhythm. Heart sounds: Normal heart sounds. Pulmonary:      Effort: Pulmonary effort is normal. No respiratory distress. Breath sounds: Normal breath sounds. No rales. Abdominal:      General: Bowel sounds are normal. There is no distension. Palpations: Abdomen is soft. There is no mass. Tenderness: There is no abdominal tenderness. There is no rebound. Musculoskeletal:         General: No tenderness or deformity. Normal range of motion. Cervical back: Normal range of motion and neck supple. Lymphadenopathy:      Cervical: No cervical adenopathy. Skin:     General: Skin is warm and dry. Findings: No erythema or rash. Neurological:      Mental Status: He is alert and oriented to person, place, and time. Cranial Nerves: No cranial nerve deficit. Coordination: Coordination normal.   Psychiatric:         Thought Content: Thought content normal.         Judgment: Judgment normal.          Assessment and plan     Plan of care has been discussed with the patient, he agrees to the plan and verbalized understanding. All his questions were answered  More than 50% of the time spent in this visit was counseling the patient about  illness and treatment options         1. Rectal cancer [facility])  He is in remission and follow-up with oncology    2. Seizure ([facility] [address]  Stable notes recent active seizures    3. Thrombocytopenia ([facility] [address]  Following up with oncology  - CBC WITH AUTOMATED DIFF; Future    4. Stage 3a chronic kidney disease (HCC)    - METABOLIC PANEL, COMPREHENSIVE; Future    5. Dizziness  Possibly due orthostatic hypotension decrease atenolol to 50 mg instead of 100    Follow-up in 1 month or sooner if symptoms are worse  - CBC WITH AUTOMATED DIFF; Future    6. Screening for glaucoma    - REFERRAL TO OPHTHALMOLOGY    7. Orthostatic hypotension    decrease atenolol to 50 mg instead of 100    He just refilled 100 mg he can cut the tablet in half    Advised the patient to stay well-hydrated and to change positions from laying to sitting to standing slowly    Essential hypertension  Well-controlled on multiple medications    Current Outpatient Medications   Medication Sig Dispense Refill    NIFEdipine ER (PROCARDIA XL) 60 mg ER tablet Take 60 mg by mouth.  simvastatin (Zocor) 40 mg tablet Take 1 Tablet by mouth nightly. On behalf of Dr. Michael Chavez. Needs appt for further refills. 90 Tablet 0    atenoloL (Tenormin) 100 mg tablet Take 1 Tablet by mouth daily. 90 Tablet 0    allopurinoL (ZYLOPRIM) 100 mg tablet Take 1 Tablet by mouth daily. 90 Tablet 0    ergocalciferol (ERGOCALCIFEROL) 1,250 mcg (50,000 unit) capsule Take 1 Capsule by mouth every seven (7) days. 12 Capsule 0    omeprazole (PRILOSEC) 20 mg capsule Take 1 Capsule by mouth daily. 90 Capsule 1    hydroCHLOROthiazide (HYDRODIURIL) 12.5 mg tablet Take 1 Tablet by mouth daily. 90 Tablet 1    losartan (COZAAR) 100 mg tablet Take 1 Tablet by mouth daily. 90 Tablet 1    NIFEdipine ER (ADALAT CC) 60 mg ER tablet Take 1 Tablet by mouth daily. 90 Tablet 1    levETIRAcetam (KEPPRA) 250 mg tablet Take 250 mg by mouth two (2) times a day.  acetaminophen (TYLENOL EXTRA STRENGTH) 500 mg tablet Take  by mouth every six (6) hours as needed for Pain.          Patient Active Problem List    Diagnosis Date Noted    Gastroesophageal reflux disease without esophagitis 01/04/2019    Essential hypertension 01/04/2019    Seizure ([address] 01/04/2019    Mixed hyperlipidemia 01/04/2019    Gout, arthritis 01/04/2019    CKD (chronic kidney disease) stage 3, GFR 30-59 ml/min (HCC) 01/04/2019    Vitamin D deficiency 01/04/2019    History of rectal cancer 05/31/2013     Results for orders placed or performed during the hospital encounter of 07/30/21   HEPATITIS C AB   Result Value Ref Range    Hepatitis C virus Ab <0.02 <0.80 Index    Hep C virus Ab Interp. Negative NEG      Hep C  virus Ab comment         CBC WITH AUTOMATED DIFF   Result Value Ref Range    WBC 9.0 4.6 - 13.2 K/uL    RBC 4.53 4.35 - 5.65 M/uL    HGB 13.5 13.0 - 16.0 g/dL    HCT 41.5 36.0 - 48.0 %    MCV 91.6 74.0 - 97.0 FL    MCH 29.8 24.0 - 34.0 PG    MCHC 32.5 31.0 - 37.0 g/dL    RDW 14.0 11.6 - 14.5 %    PLATELET 625 412 - 017 K/uL    MPV 10.5 9.2 - 11.8 FL    NEUTROPHILS 76 (H) 40 - 73 %    LYMPHOCYTES 10 (L) 21 - 52 %    MONOCYTES 10 3 - 10 %    EOSINOPHILS 4 0 - 5 %    BASOPHILS 0 0 - 2 %    ABS. NEUTROPHILS 6.8 1.8 - 8.0 K/UL    ABS. LYMPHOCYTES 0.9 0.9 - 3.6 K/UL    ABS. MONOCYTES 0.9 0.05 - 1.2 K/UL    ABS. EOSINOPHILS 0.3 0.0 - 0.4 K/UL    ABS.  BASOPHILS 0.0 0.0 - 0.1 K/UL    DF AUTOMATED     LIPID PANEL   Result Value Ref Range    LIPID PROFILE          Cholesterol, total 148 <200 MG/DL    Triglyceride 327 (H) <150 MG/DL    HDL Cholesterol 38 (L) 40 - 60 MG/DL    LDL, calculated 44.6 0 - 100 MG/DL    VLDL, calculated 65.4 MG/DL    CHOL/HDL Ratio 3.9 0 - 5.0     METABOLIC PANEL, COMPREHENSIVE   Result Value Ref Range    Sodium 139 136 - 145 mmol/L    Potassium 4.5 3.5 - 5.5 mmol/L    Chloride 106 100 - 111 mmol/L    CO2 26 21 - 32 mmol/L    Anion gap 7 3.0 - 18 mmol/L    Glucose 107 (H) 74 - 99 mg/dL    BUN 25 (H) 7.0 - 18 MG/DL    Creatinine 1.64 (H) 0.6 - 1.3 MG/DL    BUN/Creatinine ratio 15 12 - 20      GFR est AA 49 (L) >60 ml/min/1.73m2 GFR est non-AA 41 (L) >60 ml/min/1.73m2    Calcium 9.2 8.5 - 10.1 MG/DL    Bilirubin, total 0.5 0.2 - 1.0 MG/DL    ALT (SGPT) 24 16 - 61 U/L    AST (SGOT) 15 10 - 38 U/L    Alk. phosphatase 106 45 - 117 U/L    Protein, total 7.9 6.4 - 8.2 g/dL    Albumin 4.1 3.4 - 5.0 g/dL    Globulin 3.8 2.0 - 4.0 g/dL    A-G Ratio 1.1 0.8 - 1.7     PSA SCREENING (SCREENING)   Result Value Ref Range    Prostate Specific Ag 2.1 0.0 - 4.0 ng/mL   URIC ACID   Result Value Ref Range    Uric acid 6.8 2.6 - 7.2 MG/DL     No visits with results within 3 Month(s) from this visit. Latest known visit with results is:   Hospital Outpatient Visit on 07/30/2021   Component Date Value Ref Range Status    Hepatitis C virus Ab 07/30/2021 <0.02  <0.80 Index Final    Hep C virus Ab Interp. 07/30/2021 Negative  NEG   Final    Hep C  virus Ab comment 07/30/2021        Final    Comment: Index <0.80. ......................... Sabrina Brower Negative  Index > or = to 0.80 and <1.00. .... Sabrina Brower Equivocal  Index >1.00. ......................... Sabrina Brower Positive          For Equivocal or Positive results, confirmation with Hepatitis C RNA by PCR or bDNA is suggested.  WBC 07/30/2021 9.0  4.6 - 13.2 K/uL Final    RBC 07/30/2021 4.53  4.35 - 5.65 M/uL Final    HGB 07/30/2021 13.5  13.0 - 16.0 g/dL Final    HCT 07/30/2021 41.5  36.0 - 48.0 % Final    MCV 07/30/2021 91.6  74.0 - 97.0 FL Final    MCH 07/30/2021 29.8  24.0 - 34.0 PG Final    MCHC 07/30/2021 32.5  31.0 - 37.0 g/dL Final    RDW 07/30/2021 14.0  11.6 - 14.5 % Final    PLATELET 39/36/0856 287  135 - 420 K/uL Final    MPV 07/30/2021 10.5  9.2 - 11.8 FL Final    NEUTROPHILS 07/30/2021 76* 40 - 73 % Final    LYMPHOCYTES 07/30/2021 10* 21 - 52 % Final    MONOCYTES 07/30/2021 10  3 - 10 % Final    EOSINOPHILS 07/30/2021 4  0 - 5 % Final    BASOPHILS 07/30/2021 0  0 - 2 % Final    ABS. NEUTROPHILS 07/30/2021 6.8  1.8 - 8.0 K/UL Final    ABS. LYMPHOCYTES 07/30/2021 0.9  0.9 - 3.6 K/UL Final    ABS.  MONOCYTES 07/30/2021 0.9  0.05 - 1.2 K/UL Final    ABS. EOSINOPHILS 07/30/2021 0.3  0.0 - 0.4 K/UL Final    ABS. BASOPHILS 07/30/2021 0.0  0.0 - 0.1 K/UL Final    DF 07/30/2021 AUTOMATED    Final    LIPID PROFILE 07/30/2021        Final    Cholesterol, total 07/30/2021 148  <200 MG/DL Final    Triglyceride 07/30/2021 327* <150 MG/DL Final    Comment: The drugs N-acetylcysteine (NAC) and  Metamiszole have been found to cause falsely  low results in this chemical assay. Please  be sure to submit blood samples obtained  BEFORE administration of either of these  drugs to assure correct results.  HDL Cholesterol 07/30/2021 38* 40 - 60 MG/DL Final    LDL, calculated 07/30/2021 44.6  0 - 100 MG/DL Final    VLDL, calculated 07/30/2021 65.4  MG/DL Final    CHOL/HDL Ratio 07/30/2021 3.9  0 - 5.0   Final    Sodium 07/30/2021 139  136 - 145 mmol/L Final    Potassium 07/30/2021 4.5  3.5 - 5.5 mmol/L Final    Chloride 07/30/2021 106  100 - 111 mmol/L Final    CO2 07/30/2021 26  21 - 32 mmol/L Final    Anion gap 07/30/2021 7  3.0 - 18 mmol/L Final    Glucose 07/30/2021 107* 74 - 99 mg/dL Final    BUN 07/30/2021 25* 7.0 - 18 MG/DL Final    Creatinine 07/30/2021 1.64* 0.6 - 1.3 MG/DL Final    BUN/Creatinine ratio 07/30/2021 15  12 - 20   Final    GFR est AA 07/30/2021 49* >60 ml/min/1.73m2 Final    GFR est non-AA 07/30/2021 41* >60 ml/min/1.73m2 Final    Comment: (NOTE)  Estimated GFR is calculated using the Modification of Diet in Renal   Disease (MDRD) Study equation, reported for both  Americans   (GFRAA) and non- Americans (GFRNA), and normalized to 1.73m2   body surface area. The physician must decide which value applies to   the patient. The MDRD study equation should only be used in   individuals age 25 or older.  It has not been validated for the   following: pregnant women, patients with serious comorbid conditions,   or on certain medications, or persons with extremes of body size,   muscle mass, or nutritional status.  Calcium 07/30/2021 9.2  8.5 - 10.1 MG/DL Final    Bilirubin, total 07/30/2021 0.5  0.2 - 1.0 MG/DL Final    ALT (SGPT) 07/30/2021 24  16 - 61 U/L Final    AST (SGOT) 07/30/2021 15  10 - 38 U/L Final    Alk. phosphatase 07/30/2021 106  45 - 117 U/L Final    Protein, total 07/30/2021 7.9  6.4 - 8.2 g/dL Final    Albumin 07/30/2021 4.1  3.4 - 5.0 g/dL Final    Globulin 07/30/2021 3.8  2.0 - 4.0 g/dL Final    A-G Ratio 07/30/2021 1.1  0.8 - 1.7   Final    Prostate Specific Ag 07/30/2021 2.1  0.0 - 4.0 ng/mL Final    Uric acid 07/30/2021 6.8  2.6 - 7.2 MG/DL Final    Comment: The drugs N-acetylcysteine (NAC) and  Metamiszole have been found to cause falsely  low results in this chemical assay. Please  be sure to submit blood samples obtained  BEFORE administration of either of these  drugs to assure correct results. Follow-up and Dispositions    · Return in about 1 month (around 2/11/2022).

## 2022-01-11 NOTE — PROGRESS NOTES
Jd Cope is a 64 y.o. male (: 1945) presenting to address:    Chief Complaint   Patient presents with    Dizziness     started 3 wks ago       Vitals:    22 1517   BP: 128/76   Pulse: 75   Resp: 16   Temp: 97.2 °F (36.2 °C)   TempSrc: Temporal   SpO2: 99%   Weight: 146 lb 9.6 oz (66.5 kg)   Height: 5' 3\" (1.6 m)   PainSc:   0 - No pain       Hearing/Vision:   No exam data present    Learning Assessment:     Learning Assessment 2020   PRIMARY LEARNER Patient   PRIMARY LANGUAGE ENGLISH   LEARNER PREFERENCE PRIMARY DEMONSTRATION     READING     VIDEOS     LISTENING     PICTURES   ANSWERED BY patient   RELATIONSHIP SELF     Depression Screening:     3 most recent PHQ Screens 2022   Little interest or pleasure in doing things Not at all   Feeling down, depressed, irritable, or hopeless Not at all   Total Score PHQ 2 0     Fall Risk Assessment:     Fall Risk Assessment, last 12 mths 2022   Able to walk? Yes   Fall in past 12 months? 0   Do you feel unsteady? 0   Are you worried about falling 0   Fall with injury? -     Abuse Screening:     Abuse Screening Questionnaire 2022   Do you ever feel afraid of your partner? N   Are you in a relationship with someone who physically or mentally threatens you? N   Is it safe for you to go home?  Y     ADL Assessment:     ADL Assessment 2020   Feeding yourself No Help Needed   Getting from bed to chair No Help Needed   Getting dressed No Help Needed   Bathing or showering No Help Needed   Walk across the room (includes cane/walker) No Help Needed   Using the telphone No Help Needed   Taking your medications No Help Needed   Preparing meals No Help Needed   Managing money (expenses/bills) No Help Needed   Moderately strenuous housework (laundry) No Help Needed   Shopping for personal items (toiletries/medicines) No Help Needed   Shopping for groceries No Help Needed   Driving No Help Needed   Climbing a flight of stairs No Help Needed Getting to places beyond walking distances No Help Needed        Coordination of Care Questionaire:   1. \"Have you been to the ER, urgent care clinic since your last visit? Hospitalized since your last visit? \" No    2. \"Have you seen or consulted any other health care providers outside of the [address] [place] [address] since your last visit? \" Yes Where: Neurologist     3. For patients aged 39-70: Has the patient had a colonoscopy? Yes, HM satisfied with blue hyperlink     Advanced Directive:   1. Do you have an Advanced Directive? NO    2. Would you like information on Advanced Directives?  NO

## 2022-02-04 ENCOUNTER — APPOINTMENT (OUTPATIENT)
Dept: FAMILY MEDICINE CLINIC | Age: 77
End: 2022-02-04

## 2022-02-04 ENCOUNTER — HOSPITAL ENCOUNTER (OUTPATIENT)
Dept: LAB | Age: 77
Discharge: HOME | End: 2022-02-04
Payer: MEDICARE

## 2022-02-04 DIAGNOSIS — R42 DIZZINESS: ICD-10-CM

## 2022-02-04 DIAGNOSIS — N18.31 STAGE 3A CHRONIC KIDNEY DISEASE (HCC): ICD-10-CM

## 2022-02-04 DIAGNOSIS — D69.6 THROMBOCYTOPENIA (HCC): ICD-10-CM

## 2022-02-04 LAB
ALBUMIN SERPL-MCNC: 4 G/DL (ref 3.4–5)
ALBUMIN/GLOB SERPL: 1.3 {RATIO} (ref 0.8–1.7)
ALP SERPL-CCNC: 90 U/L (ref 45–117)
ALT SERPL-CCNC: 21 U/L (ref 16–61)
ANION GAP SERPL CALC-SCNC: 6 MMOL/L (ref 3–18)
AST SERPL-CCNC: 15 U/L (ref 10–38)
BASOPHILS # BLD: 0.1 K/UL (ref 0–0.1)
BASOPHILS NFR BLD: 1 % (ref 0–2)
BILIRUB SERPL-MCNC: 0.5 MG/DL (ref 0.2–1)
BUN SERPL-MCNC: 26 MG/DL (ref 7–18)
BUN/CREAT SERPL: 16 (ref 12–20)
CALCIUM SERPL-MCNC: 8.8 MG/DL (ref 8.5–10.1)
CHLORIDE SERPL-SCNC: 106 MMOL/L (ref 100–111)
CO2 SERPL-SCNC: 29 MMOL/L (ref 21–32)
CREAT SERPL-MCNC: 1.67 MG/DL (ref 0.6–1.3)
DIFFERENTIAL METHOD BLD: ABNORMAL
EOSINOPHIL # BLD: 0.4 K/UL (ref 0–0.4)
EOSINOPHIL NFR BLD: 4 % (ref 0–5)
ERYTHROCYTE [DISTWIDTH] IN BLOOD BY AUTOMATED COUNT: 13.8 % (ref 11.6–14.5)
GLOBULIN SER CALC-MCNC: 3 G/DL (ref 2–4)
GLUCOSE SERPL-MCNC: 115 MG/DL (ref 74–99)
HCT VFR BLD AUTO: 41.6 % (ref 36–48)
HGB BLD-MCNC: 13.3 G/DL (ref 13–16)
IMM GRANULOCYTES # BLD AUTO: 0.1 K/UL (ref 0–0.04)
IMM GRANULOCYTES NFR BLD AUTO: 1 % (ref 0–0.5)
LYMPHOCYTES # BLD: 1.7 K/UL (ref 0.9–3.6)
LYMPHOCYTES NFR BLD: 17 % (ref 21–52)
MCH RBC QN AUTO: 29.4 PG (ref 24–34)
MCHC RBC AUTO-ENTMCNC: 32 G/DL (ref 31–37)
MCV RBC AUTO: 91.8 FL (ref 78–100)
MONOCYTES # BLD: 0.9 K/UL (ref 0.05–1.2)
MONOCYTES NFR BLD: 9 % (ref 3–10)
NEUTS SEG # BLD: 7 K/UL (ref 1.8–8)
NEUTS SEG NFR BLD: 70 % (ref 40–73)
NRBC # BLD: 0 K/UL (ref 0–0.01)
NRBC BLD-RTO: 0 PER 100 WBC
PLATELET # BLD AUTO: 268 K/UL (ref 135–420)
PMV BLD AUTO: 10.3 FL (ref 9.2–11.8)
POTASSIUM SERPL-SCNC: 4.8 MMOL/L (ref 3.5–5.5)
PROT SERPL-MCNC: 7 G/DL (ref 6.4–8.2)
RBC # BLD AUTO: 4.53 M/UL (ref 4.35–5.65)
SODIUM SERPL-SCNC: 141 MMOL/L (ref 136–145)
WBC # BLD AUTO: 10.1 K/UL (ref 4.6–13.2)

## 2022-02-04 PROCEDURE — 36415 COLL VENOUS BLD VENIPUNCTURE: CPT

## 2022-02-04 PROCEDURE — 80053 COMPREHEN METABOLIC PANEL: CPT

## 2022-02-04 PROCEDURE — 85025 COMPLETE CBC W/AUTO DIFF WBC: CPT

## 2022-02-07 ENCOUNTER — TELEPHONE (OUTPATIENT)
Dept: FAMILY MEDICINE CLINIC | Age: 77
End: 2022-02-07

## 2022-02-07 NOTE — TELEPHONE ENCOUNTER
Left vm for pt to call the office to r/s for 2/11/22 for an earlier appt that day or another day if preferred Dr Stoner will not be available that afternoon.

## 2022-02-07 NOTE — TELEPHONE ENCOUNTER
Left vm for pt to call the office to r/s for 2/11/22 for an earlier appt that day or another day if preferred Dr Thaddeus Runner will not be available that afternoon.

## 2022-02-11 ENCOUNTER — OFFICE VISIT (OUTPATIENT)
Dept: FAMILY MEDICINE CLINIC | Age: 77
End: 2022-02-11
Payer: MEDICARE

## 2022-02-11 VITALS
SYSTOLIC BLOOD PRESSURE: 110 MMHG | RESPIRATION RATE: 16 BRPM | OXYGEN SATURATION: 97 % | HEIGHT: 63 IN | TEMPERATURE: 98 F | BODY MASS INDEX: 26.72 KG/M2 | DIASTOLIC BLOOD PRESSURE: 76 MMHG | WEIGHT: 150.8 LBS | HEART RATE: 84 BPM

## 2022-02-11 DIAGNOSIS — E78.00 HIGH CHOLESTEROL: ICD-10-CM

## 2022-02-11 DIAGNOSIS — M10.00 IDIOPATHIC GOUT, UNSPECIFIED CHRONICITY, UNSPECIFIED SITE: ICD-10-CM

## 2022-02-11 DIAGNOSIS — K21.9 GASTROESOPHAGEAL REFLUX DISEASE WITHOUT ESOPHAGITIS: ICD-10-CM

## 2022-02-11 DIAGNOSIS — R42 DIZZINESS: Primary | ICD-10-CM

## 2022-02-11 DIAGNOSIS — E55.9 VITAMIN D DEFICIENCY: ICD-10-CM

## 2022-02-11 DIAGNOSIS — I10 ESSENTIAL HYPERTENSION: ICD-10-CM

## 2022-02-11 DIAGNOSIS — N18.31 STAGE 3A CHRONIC KIDNEY DISEASE (HCC): ICD-10-CM

## 2022-02-11 PROCEDURE — 1101F PT FALLS ASSESS-DOCD LE1/YR: CPT | Performed by: LEGAL MEDICINE

## 2022-02-11 PROCEDURE — G8419 CALC BMI OUT NRM PARAM NOF/U: HCPCS | Performed by: LEGAL MEDICINE

## 2022-02-11 PROCEDURE — G8510 SCR DEP NEG, NO PLAN REQD: HCPCS | Performed by: LEGAL MEDICINE

## 2022-02-11 PROCEDURE — G8427 DOCREV CUR MEDS BY ELIG CLIN: HCPCS | Performed by: LEGAL MEDICINE

## 2022-02-11 PROCEDURE — G8536 NO DOC ELDER MAL SCRN: HCPCS | Performed by: LEGAL MEDICINE

## 2022-02-11 PROCEDURE — 99214 OFFICE O/P EST MOD 30 MIN: CPT | Performed by: LEGAL MEDICINE

## 2022-02-11 PROCEDURE — G8754 DIAS BP LESS 90: HCPCS | Performed by: LEGAL MEDICINE

## 2022-02-11 PROCEDURE — G8752 SYS BP LESS 140: HCPCS | Performed by: LEGAL MEDICINE

## 2022-02-11 RX ORDER — ATENOLOL 50 MG/1
50 TABLET ORAL DAILY
Qty: 90 TABLET | Refills: 1 | Status: SHIPPED | OUTPATIENT
Start: 2022-02-11 | End: 2022-08-29 | Stop reason: SDUPTHER

## 2022-02-11 RX ORDER — OMEPRAZOLE 20 MG/1
20 CAPSULE, DELAYED RELEASE ORAL DAILY
Qty: 90 CAPSULE | Refills: 1 | Status: SHIPPED | OUTPATIENT
Start: 2022-02-11 | End: 2022-08-29 | Stop reason: SDUPTHER

## 2022-02-11 RX ORDER — HYDROCHLOROTHIAZIDE 12.5 MG/1
12.5 TABLET ORAL DAILY
Qty: 90 TABLET | Refills: 1 | Status: SHIPPED | OUTPATIENT
Start: 2022-02-11 | End: 2022-08-29 | Stop reason: SDUPTHER

## 2022-02-11 RX ORDER — OMEPRAZOLE 20 MG/1
20 CAPSULE, DELAYED RELEASE ORAL DAILY
Qty: 90 CAPSULE | Refills: 1 | Status: SHIPPED | OUTPATIENT
Start: 2022-02-11 | End: 2022-02-11 | Stop reason: SDUPTHER

## 2022-02-11 RX ORDER — LOSARTAN POTASSIUM 100 MG/1
100 TABLET ORAL DAILY
Qty: 90 TABLET | Refills: 1 | Status: SHIPPED | OUTPATIENT
Start: 2022-02-11 | End: 2022-08-29 | Stop reason: SDUPTHER

## 2022-02-11 RX ORDER — NIFEDIPINE 60 MG/1
60 TABLET, EXTENDED RELEASE ORAL DAILY
Qty: 90 TABLET | Refills: 1 | Status: SHIPPED | OUTPATIENT
Start: 2022-02-11 | End: 2022-08-29 | Stop reason: SDUPTHER

## 2022-02-11 RX ORDER — ALLOPURINOL 100 MG/1
100 TABLET ORAL DAILY
Qty: 90 TABLET | Refills: 1 | Status: SHIPPED | OUTPATIENT
Start: 2022-02-11 | End: 2022-08-29 | Stop reason: SDUPTHER

## 2022-02-11 RX ORDER — ALLOPURINOL 100 MG/1
100 TABLET ORAL DAILY
Qty: 90 TABLET | Refills: 1 | Status: SHIPPED | OUTPATIENT
Start: 2022-02-11 | End: 2022-02-11 | Stop reason: SDUPTHER

## 2022-02-11 RX ORDER — SIMVASTATIN 40 MG/1
40 TABLET, FILM COATED ORAL
Qty: 90 TABLET | Refills: 1 | Status: SHIPPED | OUTPATIENT
Start: 2022-02-11 | End: 2022-08-29 | Stop reason: SDUPTHER

## 2022-02-11 RX ORDER — SIMVASTATIN 40 MG/1
40 TABLET, FILM COATED ORAL
Qty: 90 TABLET | Refills: 1 | Status: SHIPPED | OUTPATIENT
Start: 2022-02-11 | End: 2022-02-11 | Stop reason: SDUPTHER

## 2022-02-11 RX ORDER — NIFEDIPINE 60 MG/1
60 TABLET, EXTENDED RELEASE ORAL DAILY
Qty: 90 TABLET | Refills: 1 | Status: SHIPPED | OUTPATIENT
Start: 2022-02-11 | End: 2022-02-11 | Stop reason: SDUPTHER

## 2022-02-11 RX ORDER — ERGOCALCIFEROL 1.25 MG/1
50000 CAPSULE ORAL
Qty: 12 CAPSULE | Refills: 1 | Status: SHIPPED | OUTPATIENT
Start: 2022-02-11 | End: 2022-08-29 | Stop reason: SDUPTHER

## 2022-02-11 RX ORDER — LOSARTAN POTASSIUM 100 MG/1
100 TABLET ORAL DAILY
Qty: 90 TABLET | Refills: 1 | Status: SHIPPED | OUTPATIENT
Start: 2022-02-11 | End: 2022-02-11 | Stop reason: SDUPTHER

## 2022-02-11 RX ORDER — HYDROCHLOROTHIAZIDE 12.5 MG/1
12.5 TABLET ORAL DAILY
Qty: 90 TABLET | Refills: 1 | Status: SHIPPED | OUTPATIENT
Start: 2022-02-11 | End: 2022-02-11 | Stop reason: SDUPTHER

## 2022-02-11 RX ORDER — ATENOLOL 50 MG/1
50 TABLET ORAL DAILY
Qty: 90 TABLET | Refills: 1 | Status: SHIPPED | OUTPATIENT
Start: 2022-02-11 | End: 2022-02-11 | Stop reason: SDUPTHER

## 2022-02-11 RX ORDER — ERGOCALCIFEROL 1.25 MG/1
50000 CAPSULE ORAL
Qty: 12 CAPSULE | Refills: 1 | Status: SHIPPED | OUTPATIENT
Start: 2022-02-11 | End: 2022-02-11 | Stop reason: SDUPTHER

## 2022-02-11 NOTE — PROGRESS NOTES
Chelsey Milton is a 64 y.o. male (: 1945) presenting to address:    Chief Complaint   Patient presents with    Dizziness     1 month f/u       Vitals:    22 1258   BP: 112/68   Pulse: 87   Resp: 16   Temp: 98 °F (36.7 °C)   TempSrc: Temporal   SpO2: 97%   Weight: 150 lb 12.8 oz (68.4 kg)   Height: 5' 3\" (1.6 m)   PainSc:   0 - No pain       Hearing/Vision:   No exam data present    Learning Assessment:     Learning Assessment 2020   PRIMARY LEARNER Patient   PRIMARY LANGUAGE ENGLISH   LEARNER PREFERENCE PRIMARY DEMONSTRATION     READING     VIDEOS     LISTENING     PICTURES   ANSWERED BY patient   RELATIONSHIP SELF     Depression Screening:     3 most recent PHQ Screens 2022   Little interest or pleasure in doing things Not at all   Feeling down, depressed, irritable, or hopeless Not at all   Total Score PHQ 2 0     Fall Risk Assessment:     Fall Risk Assessment, last 12 mths 2022   Able to walk? Yes   Fall in past 12 months? 0   Do you feel unsteady? 0   Are you worried about falling 0   Fall with injury? -     Abuse Screening:     Abuse Screening Questionnaire 2022   Do you ever feel afraid of your partner? N   Are you in a relationship with someone who physically or mentally threatens you? N   Is it safe for you to go home?  Y     ADL Assessment:     ADL Assessment 2020   Feeding yourself No Help Needed   Getting from bed to chair No Help Needed   Getting dressed No Help Needed   Bathing or showering No Help Needed   Walk across the room (includes cane/walker) No Help Needed   Using the telphone No Help Needed   Taking your medications No Help Needed   Preparing meals No Help Needed   Managing money (expenses/bills) No Help Needed   Moderately strenuous housework (laundry) No Help Needed   Shopping for personal items (toiletries/medicines) No Help Needed   Shopping for groceries No Help Needed   Driving No Help Needed   Climbing a flight of stairs No Help Needed   Getting to places beyond walking distances No Help Needed        Coordination of Care Questionaire:   1. \"Have you been to the ER, urgent care clinic since your last visit? Hospitalized since your last visit? \" No    2. \"Have you seen or consulted any other health care providers outside of the [address] [place] [address] since your last visit? \" No     3. For patients aged 39-70: Has the patient had a colonoscopy / FIT/ Cologuard? Yes - Care Gap present. OVERDUE! If the patient is female:    4. For patients aged 41-77: Has the patient had a mammogram within the past 2 years? NA - based on age or sex  See top three    5. For patients aged 21-65: Has the patient had a pap smear? NA - based on age or sex    Advanced Directive:   1. Do you have an Advanced Directive? NO    2. Would you like information on Advanced Directives?  NO

## 2022-02-11 NOTE — PROGRESS NOTES
Wilfredo Nunez 42     Chief Complaint   Patient presents with    Dizziness     1 month f/u     Vitals:    22 1258 22 1325 22 1326   BP: 112/68 104/78 110/76   Pulse: 87 77 84   Resp: 16     Temp: 98 °F (36.7 °C)     TempSrc: Temporal     SpO2: 97%     Weight: 150 lb 12.8 oz (68.4 kg)     Height: 5' 3\" (1.6 m)     PainSc:   0 - No pain           HPI:Mr. Maggie Smith is here for follow up on dizziness it has improved , he had orthostatic hypotension and atenolol has been decreased to 50 mg. Orthostatic blood pressure is normal     Past Medical History:   Diagnosis Date    Arthritis     Cancer ([address]     rectal    GERD (gastroesophageal reflux disease)     Hypercholesterolemia     Hypertension      No past surgical history on file. Social History     Tobacco Use    Smoking status: Former Smoker     Packs/day: 1.00     Quit date: 1983     Years since quittin.1    Smokeless tobacco: Never Used   Substance Use Topics    Alcohol use: No       Family History   Problem Relation Age of Onset    No Known Problems Mother     No Known Problems Father        Review of Systems   Constitutional: Negative for chills, fever, malaise/fatigue and weight loss. HENT: Negative for congestion, ear discharge, ear pain, hearing loss, nosebleeds and sinus pain. Eyes: Negative for blurred vision, double vision and discharge. Respiratory: Negative for cough, hemoptysis, sputum production and shortness of breath. Cardiovascular: Negative for chest pain, palpitations, claudication and leg swelling. Gastrointestinal: Negative for abdominal pain, constipation, diarrhea, nausea and vomiting. Genitourinary: Negative for dysuria, frequency and urgency. Musculoskeletal: Negative for myalgias and neck pain. Skin: Negative for itching and rash. Neurological: Positive for dizziness. Negative for tingling, sensory change, speech change, focal weakness, weakness and headaches.        Physical Exam  Vitals and nursing note reviewed. Constitutional:       General: He is not in acute distress. Appearance: Normal appearance. He is well-developed. He is not diaphoretic. HENT:      Head: Normocephalic and atraumatic. Neck:      Thyroid: No thyromegaly. Cardiovascular:      Rate and Rhythm: Normal rate and regular rhythm. Heart sounds: Normal heart sounds. Pulmonary:      Effort: Pulmonary effort is normal. No respiratory distress. Breath sounds: Normal breath sounds. No rales. Abdominal:      General: Bowel sounds are normal. There is no distension. Palpations: Abdomen is soft. There is no mass. Tenderness: There is no abdominal tenderness. There is no rebound. Musculoskeletal:         General: No tenderness or deformity. Normal range of motion. Cervical back: Normal range of motion and neck supple. Lymphadenopathy:      Cervical: No cervical adenopathy. Skin:     General: Skin is warm and dry. Findings: No erythema or rash. Neurological:      Mental Status: He is alert and oriented to person, place, and time. Cranial Nerves: No cranial nerve deficit. Coordination: Coordination normal.   Psychiatric:         Thought Content: Thought content normal.         Judgment: Judgment normal.          Assessment and plan     Plan of care has been discussed with the patient, he agrees to the plan and verbalized understanding. All his questions were answered  More than 50% of the time spent in this visit was counseling the patient about  illness and treatment options         1. Dizziness  Much improved     2. Stage 3a chronic kidney disease (HCC)    Stable kidney function   3. Essential hypertension  Well controlled on current medication   - atenoloL (TENORMIN) 50 mg tablet; Take 1 Tablet by mouth daily for 90 days. Dispense: 90 Tablet; Refill: 1  - NIFEdipine ER (ADALAT CC) 60 mg ER tablet; Take 1 Tablet by mouth daily. Dispense: 90 Tablet;  Refill: 1  - losartan (COZAAR) 100 mg tablet; Take 1 Tablet by mouth daily. Dispense: 90 Tablet; Refill: 1  - hydroCHLOROthiazide (HYDRODIURIL) 12.5 mg tablet; Take 1 Tablet by mouth daily. Dispense: 90 Tablet; Refill: 1    4. Gastroesophageal reflux disease without esophagitis  Stable on current medications   - omeprazole (PRILOSEC) 20 mg capsule; Take 1 Capsule by mouth daily. Dispense: 90 Capsule; Refill: 1    5. High cholesterol    - simvastatin (Zocor) 40 mg tablet; Take 1 Tablet by mouth nightly. On behalf of Dr. Mj Conley. Needs appt for further refills. Dispense: 90 Tablet; Refill: 1    6. Idiopathic gout, unspecified chronicity, unspecified site  Stable with allopurinol   - allopurinoL (ZYLOPRIM) 100 mg tablet; Take 1 Tablet by mouth daily. Dispense: 90 Tablet; Refill: 1    7. Vitamin D deficiency    - ergocalciferol (ERGOCALCIFEROL) 1,250 mcg (50,000 unit) capsule; Take 1 Capsule by mouth every seven (7) days. Dispense: 12 Capsule; Refill: 1    Current Outpatient Medications   Medication Sig Dispense Refill    atenoloL (TENORMIN) 50 mg tablet Take 1 Tablet by mouth daily for 90 days. 90 Tablet 1    NIFEdipine ER (ADALAT CC) 60 mg ER tablet Take 1 Tablet by mouth daily. 90 Tablet 1    losartan (COZAAR) 100 mg tablet Take 1 Tablet by mouth daily. 90 Tablet 1    omeprazole (PRILOSEC) 20 mg capsule Take 1 Capsule by mouth daily. 90 Capsule 1    hydroCHLOROthiazide (HYDRODIURIL) 12.5 mg tablet Take 1 Tablet by mouth daily. 90 Tablet 1    simvastatin (Zocor) 40 mg tablet Take 1 Tablet by mouth nightly. On behalf of Dr. Mj Conley. Needs appt for further refills. 90 Tablet 1    ergocalciferol (ERGOCALCIFEROL) 1,250 mcg (50,000 unit) capsule Take 1 Capsule by mouth every seven (7) days. 12 Capsule 1    allopurinoL (ZYLOPRIM) 100 mg tablet Take 1 Tablet by mouth daily. 90 Tablet 1    levETIRAcetam (KEPPRA) 250 mg tablet Take 250 mg by mouth two (2) times a day.       acetaminophen (TYLENOL EXTRA STRENGTH) 500 mg tablet Take  by mouth every six (6) hours as needed for Pain. Patient Active Problem List    Diagnosis Date Noted    Gastroesophageal reflux disease without esophagitis 01/04/2019    Essential hypertension 01/04/2019    Seizure ([address] 01/04/2019    Mixed hyperlipidemia 01/04/2019    Gout, arthritis 01/04/2019    CKD (chronic kidney disease) stage 3, GFR 30-59 ml/min ([facility]) 01/04/2019    Vitamin D deficiency 01/04/2019    History of rectal cancer 05/31/2013     Results for orders placed or performed during the hospital encounter of 02/04/22   CBC WITH AUTOMATED DIFF   Result Value Ref Range    WBC 10.1 4.6 - 13.2 K/uL    RBC 4.53 4.35 - 5.65 M/uL    HGB 13.3 13.0 - 16.0 g/dL    HCT 41.6 36.0 - 48.0 %    MCV 91.8 78.0 - 100.0 FL    MCH 29.4 24.0 - 34.0 PG    MCHC 32.0 31.0 - 37.0 g/dL    RDW 13.8 11.6 - 14.5 %    PLATELET 804 412 - 186 K/uL    MPV 10.3 9.2 - 11.8 FL    NRBC 0.0 0  WBC    ABSOLUTE NRBC 0.00 0.00 - 0.01 K/uL    NEUTROPHILS 70 40 - 73 %    LYMPHOCYTES 17 (L) 21 - 52 %    MONOCYTES 9 3 - 10 %    EOSINOPHILS 4 0 - 5 %    BASOPHILS 1 0 - 2 %    IMMATURE GRANULOCYTES 1 (H) 0.0 - 0.5 %    ABS. NEUTROPHILS 7.0 1.8 - 8.0 K/UL    ABS. LYMPHOCYTES 1.7 0.9 - 3.6 K/UL    ABS. MONOCYTES 0.9 0.05 - 1.2 K/UL    ABS. EOSINOPHILS 0.4 0.0 - 0.4 K/UL    ABS. BASOPHILS 0.1 0.0 - 0.1 K/UL    ABS. IMM.  GRANS. 0.1 (H) 0.00 - 0.04 K/UL    DF AUTOMATED     METABOLIC PANEL, COMPREHENSIVE   Result Value Ref Range    Sodium 141 136 - 145 mmol/L    Potassium 4.8 3.5 - 5.5 mmol/L    Chloride 106 100 - 111 mmol/L    CO2 29 21 - 32 mmol/L    Anion gap 6 3.0 - 18 mmol/L    Glucose 115 (H) 74 - 99 mg/dL    BUN 26 (H) 7.0 - 18 MG/DL    Creatinine 1.67 (H) 0.6 - 1.3 MG/DL    BUN/Creatinine ratio 16 12 - 20      GFR est AA 48 (L) >60 ml/min/1.73m2    GFR est non-AA 40 (L) >60 ml/min/1.73m2    Calcium 8.8 8.5 - 10.1 MG/DL    Bilirubin, total 0.5 0.2 - 1.0 MG/DL    ALT (SGPT) 21 16 - 61 U/L    AST (SGOT) 15 10 - 38 U/L Alk. phosphatase 90 45 - 117 U/L    Protein, total 7.0 6.4 - 8.2 g/dL    Albumin 4.0 3.4 - 5.0 g/dL    Globulin 3.0 2.0 - 4.0 g/dL    A-G Ratio 1.3 0.8 - 1.7       Hospital Outpatient Visit on 02/04/2022   Component Date Value Ref Range Status    WBC 02/04/2022 10.1  4.6 - 13.2 K/uL Final    RBC 02/04/2022 4.53  4.35 - 5.65 M/uL Final    HGB 02/04/2022 13.3  13.0 - 16.0 g/dL Final    HCT 02/04/2022 41.6  36.0 - 48.0 % Final    MCV 02/04/2022 91.8  78.0 - 100.0 FL Final    MCH 02/04/2022 29.4  24.0 - 34.0 PG Final    MCHC 02/04/2022 32.0  31.0 - 37.0 g/dL Final    RDW 02/04/2022 13.8  11.6 - 14.5 % Final    PLATELET 88/92/6292 223  135 - 420 K/uL Final    MPV 02/04/2022 10.3  9.2 - 11.8 FL Final    NRBC 02/04/2022 0.0  0  WBC Final    ABSOLUTE NRBC 02/04/2022 0.00  0.00 - 0.01 K/uL Final    NEUTROPHILS 02/04/2022 70  40 - 73 % Final    LYMPHOCYTES 02/04/2022 17* 21 - 52 % Final    MONOCYTES 02/04/2022 9  3 - 10 % Final    EOSINOPHILS 02/04/2022 4  0 - 5 % Final    BASOPHILS 02/04/2022 1  0 - 2 % Final    IMMATURE GRANULOCYTES 02/04/2022 1* 0.0 - 0.5 % Final    ABS. NEUTROPHILS 02/04/2022 7.0  1.8 - 8.0 K/UL Final    ABS. LYMPHOCYTES 02/04/2022 1.7  0.9 - 3.6 K/UL Final    ABS. MONOCYTES 02/04/2022 0.9  0.05 - 1.2 K/UL Final    ABS. EOSINOPHILS 02/04/2022 0.4  0.0 - 0.4 K/UL Final    ABS. BASOPHILS 02/04/2022 0.1  0.0 - 0.1 K/UL Final    ABS. IMM.  GRANS. 02/04/2022 0.1* 0.00 - 0.04 K/UL Final    DF 02/04/2022 AUTOMATED    Final    Sodium 02/04/2022 141  136 - 145 mmol/L Final    Potassium 02/04/2022 4.8  3.5 - 5.5 mmol/L Final    Chloride 02/04/2022 106  100 - 111 mmol/L Final    CO2 02/04/2022 29  21 - 32 mmol/L Final    Anion gap 02/04/2022 6  3.0 - 18 mmol/L Final    Glucose 02/04/2022 115* 74 - 99 mg/dL Final    BUN 02/04/2022 26* 7.0 - 18 MG/DL Final    Creatinine 02/04/2022 1.67* 0.6 - 1.3 MG/DL Final    BUN/Creatinine ratio 02/04/2022 16  12 - 20   Final    GFR est AA 02/04/2022 48* >60 ml/min/1.73m2 Final    GFR est non-AA 02/04/2022 40* >60 ml/min/1.73m2 Final    Comment: (NOTE)  Estimated GFR is calculated using the Modification of Diet in Renal   Disease (MDRD) Study equation, reported for both  Americans   (GFRAA) and non- Americans (GFRNA), and normalized to 1.73m2   body surface area. The physician must decide which value applies to   the patient. The MDRD study equation should only be used in   individuals age 25 or older. It has not been validated for the   following: pregnant women, patients with serious comorbid conditions,   or on certain medications, or persons with extremes of body size,   muscle mass, or nutritional status.  Calcium 02/04/2022 8.8  8.5 - 10.1 MG/DL Final    Bilirubin, total 02/04/2022 0.5  0.2 - 1.0 MG/DL Final    ALT (SGPT) 02/04/2022 21  16 - 61 U/L Final    AST (SGOT) 02/04/2022 15  10 - 38 U/L Final    Alk. phosphatase 02/04/2022 90  45 - 117 U/L Final    Protein, total 02/04/2022 7.0  6.4 - 8.2 g/dL Final    Albumin 02/04/2022 4.0  3.4 - 5.0 g/dL Final    Globulin 02/04/2022 3.0  2.0 - 4.0 g/dL Final    A-G Ratio 02/04/2022 1.3  0.8 - 1.7   Final          Follow-up and Dispositions    · Return in about 6 months (around 8/11/2022) for for medicare wellness.          v

## 2022-03-18 PROBLEM — I10 ESSENTIAL HYPERTENSION: Status: ACTIVE | Noted: 2019-01-04

## 2022-03-18 PROBLEM — N18.30 CKD (CHRONIC KIDNEY DISEASE) STAGE 3, GFR 30-59 ML/MIN (HCC): Status: ACTIVE | Noted: 2019-01-04

## 2022-03-19 PROBLEM — R56.9 SEIZURE (HCC): Status: ACTIVE | Noted: 2019-01-04

## 2022-03-19 PROBLEM — K21.9 GASTROESOPHAGEAL REFLUX DISEASE WITHOUT ESOPHAGITIS: Status: ACTIVE | Noted: 2019-01-04

## 2022-03-19 PROBLEM — M10.9 GOUT, ARTHRITIS: Status: ACTIVE | Noted: 2019-01-04

## 2022-03-19 PROBLEM — E78.2 MIXED HYPERLIPIDEMIA: Status: ACTIVE | Noted: 2019-01-04

## 2022-03-20 PROBLEM — E55.9 VITAMIN D DEFICIENCY: Status: ACTIVE | Noted: 2019-01-04

## 2022-08-29 DIAGNOSIS — K21.9 GASTROESOPHAGEAL REFLUX DISEASE WITHOUT ESOPHAGITIS: ICD-10-CM

## 2022-08-29 DIAGNOSIS — E55.9 VITAMIN D DEFICIENCY: ICD-10-CM

## 2022-08-29 DIAGNOSIS — I10 ESSENTIAL HYPERTENSION: ICD-10-CM

## 2022-08-29 DIAGNOSIS — E78.00 HIGH CHOLESTEROL: ICD-10-CM

## 2022-08-29 DIAGNOSIS — M10.00 IDIOPATHIC GOUT, UNSPECIFIED CHRONICITY, UNSPECIFIED SITE: ICD-10-CM

## 2022-08-29 NOTE — TELEPHONE ENCOUNTER
Patient requesting medication be sent to [address] [place], [place] [address]. Requested Prescriptions     Pending Prescriptions Disp Refills    atenoloL (TENORMIN) 50 mg tablet 90 Tablet 1     Sig: Take 1 Tablet by mouth daily for 90 days. NIFEdipine ER (ADALAT CC) 60 mg ER tablet 90 Tablet 1     Sig: Take 1 Tablet by mouth daily. losartan (COZAAR) 100 mg tablet 90 Tablet 1     Sig: Take 1 Tablet by mouth daily. omeprazole (PRILOSEC) 20 mg capsule 90 Capsule 1     Sig: Take 1 Capsule by mouth daily. hydroCHLOROthiazide (HYDRODIURIL) 12.5 mg tablet 90 Tablet 1     Sig: Take 1 Tablet by mouth daily. simvastatin (Zocor) 40 mg tablet 90 Tablet 1     Sig: Take 1 Tablet by mouth nightly. On behalf of Dr. Tamara Ahumada. Needs appt for further refills. allopurinoL (ZYLOPRIM) 100 mg tablet 90 Tablet 1     Sig: Take 1 Tablet by mouth daily. ergocalciferol (ERGOCALCIFEROL) 1,250 mcg (50,000 unit) capsule 12 Capsule 1     Sig: Take 1 Capsule by mouth every seven (7) days. No future appointments.   Last seen 02/11/22

## 2022-09-01 RX ORDER — SIMVASTATIN 40 MG/1
40 TABLET, FILM COATED ORAL
Qty: 90 TABLET | Refills: 0 | Status: SHIPPED | OUTPATIENT
Start: 2022-09-01

## 2022-09-01 RX ORDER — ERGOCALCIFEROL 1.25 MG/1
50000 CAPSULE ORAL
Qty: 12 CAPSULE | Refills: 0 | Status: SHIPPED | OUTPATIENT
Start: 2022-09-01

## 2022-09-01 RX ORDER — OMEPRAZOLE 20 MG/1
20 CAPSULE, DELAYED RELEASE ORAL DAILY
Qty: 90 CAPSULE | Refills: 0 | Status: SHIPPED | OUTPATIENT
Start: 2022-09-01

## 2022-09-01 RX ORDER — NIFEDIPINE 60 MG/1
60 TABLET, EXTENDED RELEASE ORAL DAILY
Qty: 90 TABLET | Refills: 0 | Status: SHIPPED | OUTPATIENT
Start: 2022-09-01

## 2022-09-01 RX ORDER — LOSARTAN POTASSIUM 100 MG/1
100 TABLET ORAL DAILY
Qty: 90 TABLET | Refills: 0 | Status: SHIPPED | OUTPATIENT
Start: 2022-09-01

## 2022-09-01 RX ORDER — ATENOLOL 50 MG/1
50 TABLET ORAL DAILY
Qty: 90 TABLET | Refills: 0 | Status: SHIPPED | OUTPATIENT
Start: 2022-09-01 | End: 2022-11-30

## 2022-09-01 RX ORDER — ALLOPURINOL 100 MG/1
100 TABLET ORAL DAILY
Qty: 90 TABLET | Refills: 0 | Status: SHIPPED | OUTPATIENT
Start: 2022-09-01

## 2022-09-01 RX ORDER — HYDROCHLOROTHIAZIDE 12.5 MG/1
12.5 TABLET ORAL DAILY
Qty: 90 TABLET | Refills: 0 | Status: SHIPPED
Start: 2022-09-01 | End: 2022-09-26

## 2022-09-26 ENCOUNTER — OFFICE VISIT (OUTPATIENT)
Dept: FAMILY MEDICINE CLINIC | Age: 77
End: 2022-09-26
Payer: MEDICARE

## 2022-09-26 ENCOUNTER — APPOINTMENT (OUTPATIENT)
Dept: FAMILY MEDICINE CLINIC | Age: 77
End: 2022-09-26

## 2022-09-26 ENCOUNTER — HOSPITAL ENCOUNTER (OUTPATIENT)
Dept: LAB | Age: 77
Discharge: HOME | End: 2022-09-26
Payer: MEDICARE

## 2022-09-26 VITALS
DIASTOLIC BLOOD PRESSURE: 60 MMHG | BODY MASS INDEX: 24.56 KG/M2 | HEART RATE: 81 BPM | TEMPERATURE: 97.6 F | HEIGHT: 63 IN | SYSTOLIC BLOOD PRESSURE: 108 MMHG | WEIGHT: 138.6 LBS | RESPIRATION RATE: 15 BRPM | OXYGEN SATURATION: 98 %

## 2022-09-26 DIAGNOSIS — Z13.1 SCREENING FOR DIABETES MELLITUS (DM): ICD-10-CM

## 2022-09-26 DIAGNOSIS — E55.9 VITAMIN D DEFICIENCY: ICD-10-CM

## 2022-09-26 DIAGNOSIS — Z00.00 MEDICARE ANNUAL WELLNESS VISIT, SUBSEQUENT: Primary | ICD-10-CM

## 2022-09-26 DIAGNOSIS — C20 RECTAL CANCER (HCC): ICD-10-CM

## 2022-09-26 DIAGNOSIS — Z12.5 SCREENING FOR MALIGNANT NEOPLASM OF PROSTATE: ICD-10-CM

## 2022-09-26 DIAGNOSIS — Z00.00 MEDICARE ANNUAL WELLNESS VISIT, SUBSEQUENT: ICD-10-CM

## 2022-09-26 DIAGNOSIS — I10 ESSENTIAL HYPERTENSION: ICD-10-CM

## 2022-09-26 DIAGNOSIS — N18.31 STAGE 3A CHRONIC KIDNEY DISEASE (HCC): ICD-10-CM

## 2022-09-26 DIAGNOSIS — Z23 NEEDS FLU SHOT: ICD-10-CM

## 2022-09-26 DIAGNOSIS — M10.9 GOUT, ARTHRITIS: ICD-10-CM

## 2022-09-26 DIAGNOSIS — R73.09 ABNORMAL GLUCOSE: ICD-10-CM

## 2022-09-26 DIAGNOSIS — R56.9 SEIZURE (HCC): ICD-10-CM

## 2022-09-26 LAB
25(OH)D3 SERPL-MCNC: >150 NG/ML (ref 30–100)
ALBUMIN SERPL-MCNC: 4 G/DL (ref 3.4–5)
ALBUMIN/GLOB SERPL: 1.3 {RATIO} (ref 0.8–1.7)
ALP SERPL-CCNC: 80 U/L (ref 45–117)
ALT SERPL-CCNC: 19 U/L (ref 16–61)
ANION GAP SERPL CALC-SCNC: 7 MMOL/L (ref 3–18)
AST SERPL-CCNC: 17 U/L (ref 10–38)
BASOPHILS # BLD: 0 K/UL (ref 0–0.1)
BASOPHILS NFR BLD: 1 % (ref 0–2)
BILIRUB SERPL-MCNC: 0.6 MG/DL (ref 0.2–1)
BUN SERPL-MCNC: 29 MG/DL (ref 7–18)
BUN/CREAT SERPL: 16 (ref 12–20)
CALCIUM SERPL-MCNC: 9.7 MG/DL (ref 8.5–10.1)
CHLORIDE SERPL-SCNC: 107 MMOL/L (ref 100–111)
CHOLEST SERPL-MCNC: 140 MG/DL
CO2 SERPL-SCNC: 28 MMOL/L (ref 21–32)
CREAT SERPL-MCNC: 1.84 MG/DL (ref 0.6–1.3)
DIFFERENTIAL METHOD BLD: ABNORMAL
EOSINOPHIL # BLD: 0.1 K/UL (ref 0–0.4)
EOSINOPHIL NFR BLD: 2 % (ref 0–5)
ERYTHROCYTE [DISTWIDTH] IN BLOOD BY AUTOMATED COUNT: 14.2 % (ref 11.6–14.5)
GLOBULIN SER CALC-MCNC: 3 G/DL (ref 2–4)
GLUCOSE SERPL-MCNC: 128 MG/DL (ref 74–99)
HBA1C MFR BLD: 5.9 % (ref 4.2–5.6)
HCT VFR BLD AUTO: 40.3 % (ref 36–48)
HDLC SERPL-MCNC: 36 MG/DL (ref 40–60)
HDLC SERPL: 3.9 {RATIO} (ref 0–5)
HGB BLD-MCNC: 13 G/DL (ref 13–16)
IMM GRANULOCYTES # BLD AUTO: 0 K/UL (ref 0–0.04)
IMM GRANULOCYTES NFR BLD AUTO: 0 % (ref 0–0.5)
LDLC SERPL CALC-MCNC: 61.8 MG/DL (ref 0–100)
LIPID PROFILE,FLP: ABNORMAL
LYMPHOCYTES # BLD: 0.6 K/UL (ref 0.9–3.6)
LYMPHOCYTES NFR BLD: 7 % (ref 21–52)
MCH RBC QN AUTO: 30 PG (ref 24–34)
MCHC RBC AUTO-ENTMCNC: 32.3 G/DL (ref 31–37)
MCV RBC AUTO: 92.9 FL (ref 78–100)
MONOCYTES # BLD: 0.7 K/UL (ref 0.05–1.2)
MONOCYTES NFR BLD: 9 % (ref 3–10)
NEUTS SEG # BLD: 6.5 K/UL (ref 1.8–8)
NEUTS SEG NFR BLD: 81 % (ref 40–73)
NRBC # BLD: 0 K/UL (ref 0–0.01)
NRBC BLD-RTO: 0 PER 100 WBC
PLATELET # BLD AUTO: 252 K/UL (ref 135–420)
PMV BLD AUTO: 11.3 FL (ref 9.2–11.8)
POTASSIUM SERPL-SCNC: 4 MMOL/L (ref 3.5–5.5)
PROT SERPL-MCNC: 7 G/DL (ref 6.4–8.2)
PSA SERPL-MCNC: 2.8 NG/ML (ref 0–4)
RBC # BLD AUTO: 4.34 M/UL (ref 4.35–5.65)
SODIUM SERPL-SCNC: 142 MMOL/L (ref 136–145)
TRIGL SERPL-MCNC: 211 MG/DL (ref ?–150)
URATE SERPL-MCNC: 7.5 MG/DL (ref 2.6–7.2)
VLDLC SERPL CALC-MCNC: 42.2 MG/DL
WBC # BLD AUTO: 8 K/UL (ref 4.6–13.2)

## 2022-09-26 PROCEDURE — 1123F ACP DISCUSS/DSCN MKR DOCD: CPT | Performed by: LEGAL MEDICINE

## 2022-09-26 PROCEDURE — 80061 LIPID PANEL: CPT

## 2022-09-26 PROCEDURE — 84153 ASSAY OF PSA TOTAL: CPT

## 2022-09-26 PROCEDURE — G8420 CALC BMI NORM PARAMETERS: HCPCS | Performed by: LEGAL MEDICINE

## 2022-09-26 PROCEDURE — 83036 HEMOGLOBIN GLYCOSYLATED A1C: CPT

## 2022-09-26 PROCEDURE — 80053 COMPREHEN METABOLIC PANEL: CPT

## 2022-09-26 PROCEDURE — 84550 ASSAY OF BLOOD/URIC ACID: CPT

## 2022-09-26 PROCEDURE — G8754 DIAS BP LESS 90: HCPCS | Performed by: LEGAL MEDICINE

## 2022-09-26 PROCEDURE — G8536 NO DOC ELDER MAL SCRN: HCPCS | Performed by: LEGAL MEDICINE

## 2022-09-26 PROCEDURE — 82306 VITAMIN D 25 HYDROXY: CPT

## 2022-09-26 PROCEDURE — G8752 SYS BP LESS 140: HCPCS | Performed by: LEGAL MEDICINE

## 2022-09-26 PROCEDURE — 1101F PT FALLS ASSESS-DOCD LE1/YR: CPT | Performed by: LEGAL MEDICINE

## 2022-09-26 PROCEDURE — G8432 DEP SCR NOT DOC, RNG: HCPCS | Performed by: LEGAL MEDICINE

## 2022-09-26 PROCEDURE — 90694 VACC AIIV4 NO PRSRV 0.5ML IM: CPT | Performed by: LEGAL MEDICINE

## 2022-09-26 PROCEDURE — G0439 PPPS, SUBSEQ VISIT: HCPCS | Performed by: LEGAL MEDICINE

## 2022-09-26 PROCEDURE — G8427 DOCREV CUR MEDS BY ELIG CLIN: HCPCS | Performed by: LEGAL MEDICINE

## 2022-09-26 PROCEDURE — G0008 ADMIN INFLUENZA VIRUS VAC: HCPCS | Performed by: LEGAL MEDICINE

## 2022-09-26 PROCEDURE — 85025 COMPLETE CBC W/AUTO DIFF WBC: CPT

## 2022-09-26 PROCEDURE — 36415 COLL VENOUS BLD VENIPUNCTURE: CPT

## 2022-09-26 NOTE — PROGRESS NOTES
(AWV) The Initial Medicare Annual Wellness Exam PROGRESS NOTE    This is an Initial Medicare Annual Wellness Exam (AWV) (Performed 12 months after IPPE or effective date of Medicare Part B enrollment, Once in a lifetime)    I have reviewed the patient's medical history in detail and updated the computerized patient record. Leanne Verduzco is a 76 y.o.  male and presents for an annual wellness exam   He has no acute complaint today  Will be receiving flu vaccine today    Patient Active Problem List   Diagnosis Code    Gastroesophageal reflux disease without esophagitis K21.9    Essential hypertension I10    Seizure ([address] [address] R56.9    Mixed hyperlipidemia E78.2    Gout, arthritis M10.9    CKD (chronic kidney disease) stage 3, GFR 30-59 ml/min ([facility]) N18.30    Vitamin D deficiency E55.9    History of rectal cancer Z85.048     Patient Active Problem List    Diagnosis Date Noted    Gastroesophageal reflux disease without esophagitis 01/04/2019    Essential hypertension 01/04/2019    Seizure ([facility] [address] 01/04/2019    Mixed hyperlipidemia 01/04/2019    Gout, arthritis 01/04/2019    CKD (chronic kidney disease) stage 3, GFR 30-59 ml/min (HCC) 01/04/2019    Vitamin D deficiency 01/04/2019    History of rectal cancer 05/31/2013     Current Outpatient Medications   Medication Sig Dispense Refill    atenoloL (TENORMIN) 50 mg tablet Take 1 Tablet by mouth daily for 90 days. 90 Tablet 0    NIFEdipine ER (ADALAT CC) 60 mg ER tablet Take 1 Tablet by mouth daily. 90 Tablet 0    losartan (COZAAR) 100 mg tablet Take 1 Tablet by mouth daily. 90 Tablet 0    omeprazole (PRILOSEC) 20 mg capsule Take 1 Capsule by mouth daily. 90 Capsule 0    hydroCHLOROthiazide (HYDRODIURIL) 12.5 mg tablet Take 1 Tablet by mouth daily. 90 Tablet 0    simvastatin (Zocor) 40 mg tablet Take 1 Tablet by mouth nightly. On behalf of Dr. Emile Greer. Needs appt for further refills. 90 Tablet 0    allopurinoL (ZYLOPRIM) 100 mg tablet Take 1 Tablet by mouth daily. 90 Tablet 0    ergocalciferol (ERGOCALCIFEROL) 1,250 mcg (50,000 unit) capsule Take 1 Capsule by mouth every seven (7) days. 12 Capsule 0    levETIRAcetam (KEPPRA) 250 mg tablet Take 250 mg by mouth two (2) times a day. acetaminophen (TYLENOL) 500 mg tablet Take  by mouth every six (6) hours as needed for Pain. No Known Allergies  Past Medical History:   Diagnosis Date    Arthritis     Cancer ([facility]     rectal    GERD (gastroesophageal reflux disease)     Hypercholesterolemia     Hypertension      No past surgical history on file.   Family History   Problem Relation Age of Onset    No Known Problems Mother     No Known Problems Father      Social History     Tobacco Use    Smoking status: Former     Packs/day: 1.00     Types: Cigarettes     Quit date: 1983     Years since quittin.7    Smokeless tobacco: Never   Substance Use Topics    Alcohol use: No       ROS   History obtained from the patient  General ROS: negative for - chills, fatigue or hot flashes  Psychological ROS: negative for - anxiety or depression   Ophthalmic ROS: negative for - blurry vision, decreased vision or double vision  ENT ROS: positive for - hearing change  negative for - epistaxis or headaches  Allergy and Immunology ROS: negative  negative for - hives or insect bite sensitivity  Hematological and Lymphatic ROS: negative for - bleeding problems or blood clots  Endocrine ROS: negative for - malaise/lethargy or mood swings  [id number]}  Respiratory ROS: no cough, shortness of breath, or wheezing  Cardiovascular ROS: no chest pain or dyspnea on exertion  Gastrointestinal ROS: no abdominal pain, change in bowel habits, or black or bloody stools  Genito-Urinary ROS: no dysuria, trouble voiding, or hematuria  Musculoskeletal ROS: negative  Neurological ROS: no TIA or stroke symptoms  Dermatological ROS: negative for changing moles        History     Past Medical History:   Diagnosis Date    Arthritis     Cancer ([facility]     rectal GERD (gastroesophageal reflux disease)     Hypercholesterolemia     Hypertension       No past surgical history on file. Current Outpatient Medications   Medication Sig Dispense Refill    atenoloL (TENORMIN) 50 mg tablet Take 1 Tablet by mouth daily for 90 days. 90 Tablet 0    NIFEdipine ER (ADALAT CC) 60 mg ER tablet Take 1 Tablet by mouth daily. 90 Tablet 0    losartan (COZAAR) 100 mg tablet Take 1 Tablet by mouth daily. 90 Tablet 0    omeprazole (PRILOSEC) 20 mg capsule Take 1 Capsule by mouth daily. 90 Capsule 0    hydroCHLOROthiazide (HYDRODIURIL) 12.5 mg tablet Take 1 Tablet by mouth daily. 90 Tablet 0    simvastatin (Zocor) 40 mg tablet Take 1 Tablet by mouth nightly. On behalf of Dr. Williams Scott. Needs appt for further refills. 90 Tablet 0    allopurinoL (ZYLOPRIM) 100 mg tablet Take 1 Tablet by mouth daily. 90 Tablet 0    ergocalciferol (ERGOCALCIFEROL) 1,250 mcg (50,000 unit) capsule Take 1 Capsule by mouth every seven (7) days. 12 Capsule 0    levETIRAcetam (KEPPRA) 250 mg tablet Take 250 mg by mouth two (2) times a day. acetaminophen (TYLENOL) 500 mg tablet Take  by mouth every six (6) hours as needed for Pain.        No Known Allergies  Family History   Problem Relation Age of Onset    No Known Problems Mother     No Known Problems Father      Social History     Tobacco Use    Smoking status: Former     Packs/day: 1.00     Types: Cigarettes     Quit date: 1983     Years since quittin.7    Smokeless tobacco: Never   Substance Use Topics    Alcohol use: No     Patient Active Problem List   Diagnosis Code    Gastroesophageal reflux disease without esophagitis K21.9    Essential hypertension I10    Seizure ([facility] R56.9    Mixed hyperlipidemia E78.2    Gout, arthritis M10.9    CKD (chronic kidney disease) stage 3, GFR 30-59 ml/min ([facility]) N18.30    Vitamin D deficiency E55.9    History of rectal cancer Z85.048       Health Maintenance History  Immunizations reviewed, he will be getting p flu vaccine today  colonoscopy:last coloscopy  2017  he decided to stop screening     Eye exam: has appointment coming up           Depression Risk Factor Screening:      Patient Health Questionnaire (PHQ-2)   Over the last 2 weeks, how often have you been bothered by any of the following problems? Little interest or pleasure in doing things? Not at all. [0]  Feeling down, depressed, or hopeless? Not at all. [0]    Total Score: 0/6  PHQ-2 Assessment Scoring:   A score of 2 or more requires further screening with the PHQ-9    Alcohol Risk Factor Screening:     Women: On any occasion during the past 3 months, have you had more than 3 drinks containing alcohol? Do you average more than 7 drinks per week? Men: On any occasion during the past 3 months, have you had more than 4 drinks containing alcohol? No   Do you average more than 14 drinks per week? No     Functional Ability and Level of Safety:     Hearing Loss    The patient needs further evaluation. Activities of Daily Living   Self-care. Requires assistance with: no ADLs    Fall Risk   No fall risk factors    Abuse Screen   Patient is not abused  None    Examination   Physical Examination  Vitals:    09/26/22 1008   BP: 108/60   Pulse: 81   Resp: 15   Temp: 97.6 °F (36.4 °C)   TempSrc: Temporal   SpO2: 98%   Weight: 138 lb 9.6 oz (62.9 kg)   Height: 5' 3\" (1.6 m)   PainSc:   0 - No pain      Body mass index is 24.55 kg/m².      Evaluation of Cognitive Function:  Mood/affect:Great   Appearance:well groomed   Family member/caregiver input:not accompanied at this visitr     alert, well appearing, and in no distress, oriented to person, place, and time, and normal appearing weight    Patient Care Team:  Alma Rosa Aranda MD as PCP - General (Internal Medicine Physician)  Alma Rosa Aranda MD as PCP - [facility]  Maco Tapia MD (Internal Medicine Physician)  Rowena Layton MD (Neurology)  Adan Michaels MD (Proctology)  Radha Casillas MD as Physician (Ophthalmology)    End-of-life planning  Advanced Directive in the case than an injury or illness causes the patient to be unable to make health care decisions    Health Care Directive or Living Will: no    Advice/Referrals/Counselling/Plan:   Education and counseling provided:  Are appropriate based on today's review and evaluation  Pneumococcal Vaccine  Influenza Vaccine  Prostate cancer screening tests (PSA, covered annually)  Colorectal cancer screening tests  Screening for glaucoma  Diabetes screening test  Include in education list (weight loss, physical activity, smoking cessation, fall prevention, and nutrition)    ICD-10-CM ICD-9-CM    1. Medicare annual wellness visit, subsequent  Z00.00 V70.0 LIPID PANEL      METABOLIC PANEL, COMPREHENSIVE      CBC WITH AUTOMATED DIFF      2. Needs flu shot  Z23 V04.81 INFLUENZA, FLUAD, (AGE 65 Y+), IM, PF, 0.5 ML      3. Essential hypertension  I10 401.9       4. Vitamin D deficiency  E55.9 268.9 VITAMIN D, 25 HYDROXY      5. Stage 3a chronic kidney disease (HCC)  N18.31 585.3 HEMOGLOBIN A1C W/O EAG      6. Rectal cancer (HCC)  C20 154.1       7. Seizure ([facility]  R56.9 780.39       8. Screening for malignant neoplasm of prostate  Z12.5 V76.44 PSA SCREENING (SCREENING)      9. Gout, arthritis  M10.9 274.00 URIC ACID      10. Screening for diabetes mellitus (DM)  Z13.1 V77.1 HEMOGLOBIN A1C W/O EAG      11. Abnormal glucose  R73.09 790.29 HEMOGLOBIN A1C W/O EAG        Encounter Diagnoses   Name Primary?     Medicare annual wellness visit, subsequent Yes    Needs flu shot     Essential hypertension     Vitamin D deficiency     Stage 3a chronic kidney disease ([address]     Rectal cancer ([address]     Seizure ([facility]     Screening for malignant neoplasm of prostate     Gout, arthritis     Screening for diabetes mellitus (DM)     Abnormal glucose      Orders Placed This Encounter    Influenza Vaccine, QUAD, 65 Yrs +  IM  (Fluad 45723 ) LIPID PANEL    METABOLIC PANEL, COMPREHENSIVE    URIC ACID    PSA SCREENING (SCREENING)    CBC WITH AUTOMATED DIFF    VITAMIN D, 25 HYDROXY    HEMOGLOBIN A1C W/O EAG     Orders Placed This Encounter    Influenza Vaccine, QUAD, 65 Yrs +  IM  (Fluad [id number] )    LIPID PANEL     Standing Status:   Future     Standing Expiration Date:   4/67/1604    METABOLIC PANEL, COMPREHENSIVE     Standing Status:   Future     Standing Expiration Date:   9/27/2023    URIC ACID     Standing Status:   Future     Standing Expiration Date:   9/27/2023    PSA SCREENING (SCREENING)     Standing Status:   Future     Standing Expiration Date:   9/27/2023    CBC WITH AUTOMATED DIFF     Standing Status:   Future     Standing Expiration Date:   9/27/2023    VITAMIN D, 25 HYDROXY     Standing Status:   Future     Standing Expiration Date:   9/27/2023    HEMOGLOBIN A1C W/O EAG     Standing Status:   Future     Standing Expiration Date:   9/27/2023     Orders Placed This Encounter    Influenza Vaccine, QUAD, 65 Yrs +  IM  (Fluad 60775 )    LIPID PANEL    METABOLIC PANEL, COMPREHENSIVE    URIC ACID    PSA SCREENING (SCREENING)    CBC WITH AUTOMATED DIFF    VITAMIN D, 25 HYDROXY    HEMOGLOBIN A1C W/O EAG     Diagnoses and all orders for this visit:    1. Medicare annual wellness visit, subsequent  -     LIPID PANEL; Future  -     METABOLIC PANEL, COMPREHENSIVE; Future  -     CBC WITH AUTOMATED DIFF; Future    2. Needs flu shot  Vaccine was given is no complications  -     INFLUENZA, FLUAD, (AGE 65 Y+), IM, PF, 0.5 ML    3. Essential hypertension  Blood pressures well controlled actually is a lower range of normal to stop hydrochlorothiazide  4. Vitamin D deficiency  -     VITAMIN D, 25 HYDROXY; Future    5. Stage 3a chronic kidney disease (HCC)  Continue avoiding all NSAIDs    -     HEMOGLOBIN A1C W/O EAG; Future    6. Rectal cancer [facility])  He is not following up with oncologist anymore  7.  Seizure ([facility]  Stable on current medication and he does follow-up with neurologist  8. Screening for malignant neoplasm of prostate  -     PSA SCREENING (SCREENING); Future    9. Gout, arthritis  He is adherent to allopurinol no recent exacerbations  -     URIC ACID; Future    10. Screening for diabetes mellitus (DM)  -     HEMOGLOBIN A1C W/O EAG; Future    11. Abnormal glucose    -     HEMOGLOBIN A1C W/O EAG; Future      Follow-up and Dispositions    Return in about 6 months (around 3/26/2023). current treatment plan is effective, no change in therapy  the following changes in treatment are made: to stop HCTZ due to low BP   lab results and schedule of future lab studies reviewed with patient  reviewed diet, exercise and weight control. Brief written plan, checklist    I have discussed the diagnosis with the patient and the intended plan as seen in the above orders. The patient has received an after-visit summary and questions were answered concerning future plans. I have discussed medication side effects and warnings with the patient as well. I have reviewed the plan of care with the patient, accepted their input and they are in agreement with the treatment goals.                ____________________________________________________________    Problem Assessment    for treatment of   Chief Complaint   Patient presents with    Annual Wellness Visit

## 2022-09-26 NOTE — PROGRESS NOTES
Fran Kearns is a 76 y.o. male (: 1945) presenting to address:    Chief Complaint   Patient presents with    Annual Wellness Visit       Vitals:    22 1008   BP: 108/60   Pulse: 81   Resp: 15   Temp: 97.6 °F (36.4 °C)   TempSrc: Temporal   SpO2: 98%   Weight: 138 lb 9.6 oz (62.9 kg)   Height: 5' 3\" (1.6 m)   PainSc:   0 - No pain       Hearing/Vision:   No results found. Learning Assessment:     Learning Assessment 2020   PRIMARY LEARNER Patient   PRIMARY LANGUAGE ENGLISH   LEARNER PREFERENCE PRIMARY DEMONSTRATION     READING     VIDEOS     LISTENING     PICTURES   ANSWERED BY patient   RELATIONSHIP SELF     Depression Screening:     3 most recent PHQ Screens 2022   Little interest or pleasure in doing things Not at all   Feeling down, depressed, irritable, or hopeless Not at all   Total Score PHQ 2 0     Fall Risk Assessment:     Fall Risk Assessment, last 12 mths 2022   Able to walk? Yes   Fall in past 12 months? 0   Do you feel unsteady? 0   Are you worried about falling 0   Fall with injury? -     Abuse Screening:     Abuse Screening Questionnaire 2022   Do you ever feel afraid of your partner? N   Are you in a relationship with someone who physically or mentally threatens you? N   Is it safe for you to go home?  Y     ADL Assessment:     ADL Assessment 2020   Feeding yourself No Help Needed   Getting from bed to chair No Help Needed   Getting dressed No Help Needed   Bathing or showering No Help Needed   Walk across the room (includes cane/walker) No Help Needed   Using the telphone No Help Needed   Taking your medications No Help Needed   Preparing meals No Help Needed   Managing money (expenses/bills) No Help Needed   Moderately strenuous housework (laundry) No Help Needed   Shopping for personal items (toiletries/medicines) No Help Needed   Shopping for groceries No Help Needed   Driving No Help Needed   Climbing a flight of stairs No Help Needed   Getting to places beyond walking distances No Help Needed        Coordination of Care Questionaire:   1. \"Have you been to the ER, urgent care clinic since your last visit? Hospitalized since your last visit? \" No    2. \"Have you seen or consulted any other health care providers outside of the [address] [place] [address] since your last visit? \" No     3. For patients aged 39-70: Has the patient had a colonoscopy / FIT/ Cologuard? NA - based on age    If the patient is female:    4. For patients aged 41-77: Has the patient had a mammogram within the past 2 years? NA - based on age or sex  See top three    5. For patients aged 21-65: Has the patient had a pap smear? NA - based on age or sex    Advanced Directive:   1. Do you have an Advanced Directive? NO    2. Would you like information on Advanced Directives? NO    Pt requests flu vaccine. Immunization of high dose flu vaccine was administered 9/26/2022 by Jane Rowley LPN. Patient tolerated procedure well. No reactions noted.

## 2022-09-28 NOTE — PROGRESS NOTES
Elevated vitamin D to stop all vitamin D supplements until further notice  Normal prostate test  Hemoglobin A1c is 5.9  Cholesterol panel is normal except for slightly elevated triglyceride that can improve by decreasing sugar intake  Also good cholesterol is low that can improve his eating healthy fats and physical activities  Slight decrease in kidney function, continue to avoid all NSAIDs, recheck blood work in 3 months

## 2022-09-30 NOTE — PROGRESS NOTES
Informed pt of lab results and tx plan; pt voiced understanding and will call back to schedule repeat lab appt for Dec 2022.

## 2022-11-28 DIAGNOSIS — K21.9 GASTROESOPHAGEAL REFLUX DISEASE WITHOUT ESOPHAGITIS: ICD-10-CM

## 2022-11-28 DIAGNOSIS — E78.00 HIGH CHOLESTEROL: ICD-10-CM

## 2022-11-28 DIAGNOSIS — M10.00 IDIOPATHIC GOUT, UNSPECIFIED CHRONICITY, UNSPECIFIED SITE: ICD-10-CM

## 2022-11-28 DIAGNOSIS — I10 ESSENTIAL HYPERTENSION: ICD-10-CM

## 2022-11-28 NOTE — TELEPHONE ENCOUNTER
Patient called to request medication refill. No future appointments. Last seen 09/26/22  Requested Prescriptions     Pending Prescriptions Disp Refills    losartan (COZAAR) 100 mg tablet 90 Tablet 0     Sig: Take 1 Tablet by mouth daily. omeprazole (PRILOSEC) 20 mg capsule 90 Capsule 0     Sig: Take 1 Capsule by mouth daily. atenoloL (TENORMIN) 50 mg tablet 90 Tablet 0     Sig: Take 1 Tablet by mouth daily for 90 days. NIFEdipine ER (ADALAT CC) 60 mg ER tablet 90 Tablet 0     Sig: Take 1 Tablet by mouth daily. simvastatin (Zocor) 40 mg tablet 90 Tablet 0     Sig: Take 1 Tablet by mouth nightly. On behalf of Dr. Jose Elias Vergara. Needs appt for further refills. allopurinoL (ZYLOPRIM) 100 mg tablet 90 Tablet 0     Sig: Take 1 Tablet by mouth daily.

## 2022-11-29 RX ORDER — NIFEDIPINE 60 MG/1
60 TABLET, EXTENDED RELEASE ORAL DAILY
Qty: 90 TABLET | Refills: 0 | Status: SHIPPED | OUTPATIENT
Start: 2022-11-29

## 2022-11-29 RX ORDER — ALLOPURINOL 100 MG/1
100 TABLET ORAL DAILY
Qty: 90 TABLET | Refills: 0 | Status: SHIPPED | OUTPATIENT
Start: 2022-11-29

## 2022-11-29 RX ORDER — LOSARTAN POTASSIUM 100 MG/1
100 TABLET ORAL DAILY
Qty: 90 TABLET | Refills: 0 | Status: SHIPPED | OUTPATIENT
Start: 2022-11-29

## 2022-11-29 RX ORDER — ATENOLOL 50 MG/1
50 TABLET ORAL DAILY
Qty: 90 TABLET | Refills: 0 | Status: SHIPPED | OUTPATIENT
Start: 2022-11-29 | End: 2023-02-27

## 2022-11-29 RX ORDER — OMEPRAZOLE 20 MG/1
20 CAPSULE, DELAYED RELEASE ORAL DAILY
Qty: 90 CAPSULE | Refills: 0 | Status: SHIPPED | OUTPATIENT
Start: 2022-11-29

## 2022-11-29 RX ORDER — SIMVASTATIN 40 MG/1
40 TABLET, FILM COATED ORAL
Qty: 90 TABLET | Refills: 0 | Status: SHIPPED | OUTPATIENT
Start: 2022-11-29

## 2023-01-01 NOTE — TELEPHONE ENCOUNTER
Please get patient recent lab from Luis Earl MD Problem: NORMAL   Goal: Experiences normal transition  Description: INTERVENTIONS:  - Monitor vital signs  - Maintain thermoregulation  - Assess for hypoglycemia risk factors or signs and symptoms  - Assess for sepsis risk factors or signs and symptoms  - Assess for jaundice risk and/or signs and symptoms  Outcome: Completed  Goal: Total weight loss less than 10% of birth weight  Description: INTERVENTIONS:  - Assess feeding patterns  - Weigh daily  Outcome: Completed     Problem: Adequate NUTRIENT INTAKE -   Goal: Nutrient/Hydration intake appropriate for improving, restoring or maintaining nutritional needs  Description: INTERVENTIONS:  - Assess growth and nutritional status of patients and recommend course of action  - Monitor nutrient intake, labs, and treatment plans  - Recommend appropriate diets and vitamin/mineral supplements  - Monitor and recommend adjustments to tube feedings and TPN/PPN based on assessed needs  - Provide specific nutrition education as appropriate  Outcome: Completed  Goal: Breast feeding baby will demonstrate adequate intake  Description: Interventions:  - Monitor/record daily weights and I&O  - Monitor milk transfer  - Increase maternal fluid intake  - Increase breastfeeding frequency and duration  - Teach mother to massage breast before feeding/during infant pauses during feeding  - Pump breast after feeding  - Review breastfeeding discharge plan with mother.  Refer to breast feeding support groups  - Initiate discussion/inform physician of weight loss and interventions taken  - Help mother initiate breast feeding within an hour of birth  - Encourage skin to skin time with  within 5 minutes of birth  - Give  no food or drink other than breast milk  - Encourage rooming in  - Encourage breast feeding on demand  - Initiate SLP consult as needed  Outcome: Completed Problem: NORMAL   Goal: Experiences normal transition  Description: INTERVENTIONS:  - Monitor vital signs  - Maintain thermoregulation  - Assess for hypoglycemia risk factors or signs and symptoms  - Assess for sepsis risk factors or signs and symptoms  - Assess for jaundice risk and/or signs and symptoms  Outcome: Progressing  Goal: Total weight loss less than 10% of birth weight  Description: INTERVENTIONS:  - Assess feeding patterns  - Weigh daily  Outcome: Progressing     Problem: Adequate NUTRIENT INTAKE -   Goal: Nutrient/Hydration intake appropriate for improving, restoring or maintaining nutritional needs  Description: INTERVENTIONS:  - Assess growth and nutritional status of patients and recommend course of action  - Monitor nutrient intake, labs, and treatment plans  - Recommend appropriate diets and vitamin/mineral supplements  - Monitor and recommend adjustments to tube feedings and TPN/PPN based on assessed needs  - Provide specific nutrition education as appropriate  Outcome: Progressing  Goal: Breast feeding baby will demonstrate adequate intake  Description: Interventions:  - Monitor/record daily weights and I&O  - Monitor milk transfer  - Increase maternal fluid intake  - Increase breastfeeding frequency and duration  - Teach mother to massage breast before feeding/during infant pauses during feeding  - Pump breast after feeding  - Review breastfeeding discharge plan with mother.  Refer to breast feeding support groups  - Initiate discussion/inform physician of weight loss and interventions taken  - Help mother initiate breast feeding within an hour of birth  - Encourage skin to skin time with  within 5 minutes of birth  - Give  no food or drink other than breast milk  - Encourage rooming in  - Encourage breast feeding on demand  - Initiate SLP consult as needed  Outcome: Progressing Problem: NORMAL   Goal: Experiences normal transition  Description: INTERVENTIONS:  - Monitor vital signs  - Maintain thermoregulation  - Assess for hypoglycemia risk factors or signs and symptoms  - Assess for sepsis risk factors or signs and symptoms  - Assess for jaundice risk and/or signs and symptoms  Outcome: Progressing  Goal: Total weight loss less than 10% of birth weight  Description: INTERVENTIONS:  - Assess feeding patterns  - Weigh daily  Outcome: Progressing     Problem: Adequate NUTRIENT INTAKE -   Goal: Nutrient/Hydration intake appropriate for improving, restoring or maintaining nutritional needs  Description: INTERVENTIONS:  - Assess growth and nutritional status of patients and recommend course of action  - Monitor nutrient intake, labs, and treatment plans  - Recommend appropriate diets and vitamin/mineral supplements  - Monitor and recommend adjustments to tube feedings and TPN/PPN based on assessed needs  - Provide specific nutrition education as appropriate  Outcome: Progressing  Goal: Breast feeding baby will demonstrate adequate intake  Description: Interventions:  - Monitor/record daily weights and I&O  - Monitor milk transfer  - Increase maternal fluid intake  - Increase breastfeeding frequency and duration  - Teach mother to massage breast before feeding/during infant pauses during feeding  - Pump breast after feeding  - Review breastfeeding discharge plan with mother.  Refer to breast feeding support groups  - Initiate discussion/inform physician of weight loss and interventions taken  - Help mother initiate breast feeding within an hour of birth  - Encourage skin to skin time with  within 5 minutes of birth  - Give  no food or drink other than breast milk  - Encourage rooming in  - Encourage breast feeding on demand  - Initiate SLP consult as needed  Outcome: Progressing Problem: NORMAL   Goal: Experiences normal transition  Description: INTERVENTIONS:  - Monitor vital signs  - Maintain thermoregulation  - Assess for hypoglycemia risk factors or signs and symptoms  - Assess for sepsis risk factors or signs and symptoms  - Assess for jaundice risk and/or signs and symptoms  Outcome: Progressing  Goal: Total weight loss less than 10% of birth weight  Description: INTERVENTIONS:  - Assess feeding patterns  - Weigh daily  Outcome: Progressing     Problem: Adequate NUTRIENT INTAKE -   Goal: Nutrient/Hydration intake appropriate for improving, restoring or maintaining nutritional needs  Description: INTERVENTIONS:  - Assess growth and nutritional status of patients and recommend course of action  - Monitor nutrient intake, labs, and treatment plans  - Recommend appropriate diets and vitamin/mineral supplements  - Monitor and recommend adjustments to tube feedings and TPN/PPN based on assessed needs  - Provide specific nutrition education as appropriate  Outcome: Progressing  Goal: Breast feeding baby will demonstrate adequate intake  Description: Interventions:  - Monitor/record daily weights and I&O  - Monitor milk transfer  - Increase maternal fluid intake  - Increase breastfeeding frequency and duration  - Teach mother to massage breast before feeding/during infant pauses during feeding  - Pump breast after feeding  - Review breastfeeding discharge plan with mother.  Refer to breast feeding support groups  - Initiate discussion/inform physician of weight loss and interventions taken  - Help mother initiate breast feeding within an hour of birth  - Encourage skin to skin time with  within 5 minutes of birth  - Give  no food or drink other than breast milk  - Encourage rooming in  - Encourage breast feeding on demand  - Initiate SLP consult as needed  Outcome: Progressing Problem: NORMAL   Goal: Experiences normal transition  Description: INTERVENTIONS:  - Monitor vital signs  - Maintain thermoregulation  - Assess for hypoglycemia risk factors or signs and symptoms  - Assess for sepsis risk factors or signs and symptoms  - Assess for jaundice risk and/or signs and symptoms  Outcome: Progressing  Goal: Total weight loss less than 10% of birth weight  Description: INTERVENTIONS:  - Assess feeding patterns  - Weigh daily  Outcome: Progressing     Problem: Adequate NUTRIENT INTAKE -   Goal: Nutrient/Hydration intake appropriate for improving, restoring or maintaining nutritional needs  Description: INTERVENTIONS:  - Assess growth and nutritional status of patients and recommend course of action  - Monitor nutrient intake, labs, and treatment plans  - Recommend appropriate diets and vitamin/mineral supplements  - Monitor and recommend adjustments to tube feedings and TPN/PPN based on assessed needs  - Provide specific nutrition education as appropriate  Outcome: Progressing  Goal: Breast feeding baby will demonstrate adequate intake  Description: Interventions:  - Monitor/record daily weights and I&O  - Monitor milk transfer  - Increase maternal fluid intake  - Increase breastfeeding frequency and duration  - Teach mother to massage breast before feeding/during infant pauses during feeding  - Pump breast after feeding  - Review breastfeeding discharge plan with mother.  Refer to breast feeding support groups  - Initiate discussion/inform physician of weight loss and interventions taken  - Help mother initiate breast feeding within an hour of birth  - Encourage skin to skin time with  within 5 minutes of birth  - Give  no food or drink other than breast milk  - Encourage rooming in  - Encourage breast feeding on demand  - Initiate SLP consult as needed  Outcome: Progressing Cardiac

## 2023-03-08 ENCOUNTER — HOSPITAL ENCOUNTER (OUTPATIENT)
Facility: HOSPITAL | Age: 78
Setting detail: SPECIMEN
Discharge: HOME | End: 2023-03-11
Payer: MEDICARE

## 2023-03-08 ENCOUNTER — OFFICE VISIT (OUTPATIENT)
Dept: FAMILY MEDICINE CLINIC | Facility: CLINIC | Age: 78
End: 2023-03-08
Payer: MEDICARE

## 2023-03-08 VITALS
TEMPERATURE: 97.6 F | RESPIRATION RATE: 14 BRPM | HEART RATE: 94 BPM | SYSTOLIC BLOOD PRESSURE: 118 MMHG | DIASTOLIC BLOOD PRESSURE: 78 MMHG | BODY MASS INDEX: 24.98 KG/M2 | OXYGEN SATURATION: 98 % | WEIGHT: 141 LBS | HEIGHT: 63 IN

## 2023-03-08 DIAGNOSIS — N18.31 CHRONIC KIDNEY DISEASE, STAGE 3A (HCC): ICD-10-CM

## 2023-03-08 DIAGNOSIS — I10 ESSENTIAL (PRIMARY) HYPERTENSION: ICD-10-CM

## 2023-03-08 DIAGNOSIS — R73.03 PREDIABETES: ICD-10-CM

## 2023-03-08 DIAGNOSIS — I10 ESSENTIAL (PRIMARY) HYPERTENSION: Primary | ICD-10-CM

## 2023-03-08 DIAGNOSIS — E78.2 MIXED HYPERLIPIDEMIA: ICD-10-CM

## 2023-03-08 DIAGNOSIS — C20 MALIGNANT NEOPLASM OF RECTUM (HCC): ICD-10-CM

## 2023-03-08 DIAGNOSIS — M10.9 GOUT, ARTHRITIS: ICD-10-CM

## 2023-03-08 DIAGNOSIS — K21.9 GASTROESOPHAGEAL REFLUX DISEASE WITHOUT ESOPHAGITIS: ICD-10-CM

## 2023-03-08 DIAGNOSIS — D69.6 THROMBOCYTOPENIA, UNSPECIFIED (HCC): ICD-10-CM

## 2023-03-08 DIAGNOSIS — R56.9 CONVULSIONS, UNSPECIFIED CONVULSION TYPE (HCC): ICD-10-CM

## 2023-03-08 LAB
ALBUMIN SERPL-MCNC: 4 G/DL (ref 3.4–5)
ALBUMIN/GLOB SERPL: 1.3 (ref 0.8–1.7)
ALP SERPL-CCNC: 97 U/L (ref 45–117)
ALT SERPL-CCNC: 20 U/L (ref 16–61)
ANION GAP SERPL CALC-SCNC: 4 MMOL/L (ref 3–18)
AST SERPL-CCNC: 16 U/L (ref 10–38)
BASOPHILS # BLD: 0.1 K/UL (ref 0–0.1)
BASOPHILS NFR BLD: 1 % (ref 0–2)
BILIRUB SERPL-MCNC: 0.5 MG/DL (ref 0.2–1)
BUN SERPL-MCNC: 25 MG/DL (ref 7–18)
BUN/CREAT SERPL: 16 (ref 12–20)
CALCIUM SERPL-MCNC: 8.9 MG/DL (ref 8.5–10.1)
CHLORIDE SERPL-SCNC: 110 MMOL/L (ref 100–111)
CO2 SERPL-SCNC: 26 MMOL/L (ref 21–32)
CREAT SERPL-MCNC: 1.54 MG/DL (ref 0.6–1.3)
DIFFERENTIAL METHOD BLD: ABNORMAL
EOSINOPHIL # BLD: 0.2 K/UL (ref 0–0.4)
EOSINOPHIL NFR BLD: 2 % (ref 0–5)
ERYTHROCYTE [DISTWIDTH] IN BLOOD BY AUTOMATED COUNT: 13.7 % (ref 11.6–14.5)
EST. AVERAGE GLUCOSE BLD GHB EST-MCNC: 117 MG/DL
GLOBULIN SER CALC-MCNC: 3.2 G/DL (ref 2–4)
GLUCOSE SERPL-MCNC: 118 MG/DL (ref 74–99)
HBA1C MFR BLD: 5.7 % (ref 4.2–5.6)
HCT VFR BLD AUTO: 39.9 % (ref 36–48)
HGB BLD-MCNC: 12.9 G/DL (ref 13–16)
IMM GRANULOCYTES # BLD AUTO: 0 K/UL (ref 0–0.04)
IMM GRANULOCYTES NFR BLD AUTO: 1 % (ref 0–0.5)
LYMPHOCYTES # BLD: 0.7 K/UL (ref 0.9–3.6)
LYMPHOCYTES NFR BLD: 9 % (ref 21–52)
MCH RBC QN AUTO: 29.9 PG (ref 24–34)
MCHC RBC AUTO-ENTMCNC: 32.3 G/DL (ref 31–37)
MCV RBC AUTO: 92.4 FL (ref 78–100)
MONOCYTES # BLD: 0.7 K/UL (ref 0.05–1.2)
MONOCYTES NFR BLD: 8 % (ref 3–10)
NEUTS SEG # BLD: 7 K/UL (ref 1.8–8)
NEUTS SEG NFR BLD: 81 % (ref 40–73)
NRBC # BLD: 0 K/UL (ref 0–0.01)
NRBC BLD-RTO: 0 PER 100 WBC
PLATELET # BLD AUTO: 249 K/UL (ref 135–420)
PMV BLD AUTO: 10.6 FL (ref 9.2–11.8)
POTASSIUM SERPL-SCNC: 4.4 MMOL/L (ref 3.5–5.5)
PROT SERPL-MCNC: 7.2 G/DL (ref 6.4–8.2)
RBC # BLD AUTO: 4.32 M/UL (ref 4.35–5.65)
SODIUM SERPL-SCNC: 140 MMOL/L (ref 136–145)
WBC # BLD AUTO: 8.6 K/UL (ref 4.6–13.2)

## 2023-03-08 PROCEDURE — G8427 DOCREV CUR MEDS BY ELIG CLIN: HCPCS | Performed by: LEGAL MEDICINE

## 2023-03-08 PROCEDURE — 3078F DIAST BP <80 MM HG: CPT | Performed by: LEGAL MEDICINE

## 2023-03-08 PROCEDURE — 99214 OFFICE O/P EST MOD 30 MIN: CPT | Performed by: LEGAL MEDICINE

## 2023-03-08 PROCEDURE — G8484 FLU IMMUNIZE NO ADMIN: HCPCS | Performed by: LEGAL MEDICINE

## 2023-03-08 PROCEDURE — 83036 HEMOGLOBIN GLYCOSYLATED A1C: CPT

## 2023-03-08 PROCEDURE — 85025 COMPLETE CBC W/AUTO DIFF WBC: CPT

## 2023-03-08 PROCEDURE — 1123F ACP DISCUSS/DSCN MKR DOCD: CPT | Performed by: LEGAL MEDICINE

## 2023-03-08 PROCEDURE — 80053 COMPREHEN METABOLIC PANEL: CPT

## 2023-03-08 PROCEDURE — 3074F SYST BP LT 130 MM HG: CPT | Performed by: LEGAL MEDICINE

## 2023-03-08 PROCEDURE — G8420 CALC BMI NORM PARAMETERS: HCPCS | Performed by: LEGAL MEDICINE

## 2023-03-08 PROCEDURE — 36415 COLL VENOUS BLD VENIPUNCTURE: CPT

## 2023-03-08 PROCEDURE — 1036F TOBACCO NON-USER: CPT | Performed by: LEGAL MEDICINE

## 2023-03-08 RX ORDER — ALLOPURINOL 100 MG/1
100 TABLET ORAL DAILY
Qty: 90 TABLET | Refills: 3 | Status: SHIPPED | OUTPATIENT
Start: 2023-03-08 | End: 2023-06-06

## 2023-03-08 RX ORDER — LATANOPROST 50 UG/ML
SOLUTION/ DROPS OPHTHALMIC
COMMUNITY
Start: 2022-10-06 | End: 2023-10-06

## 2023-03-08 RX ORDER — NIFEDIPINE 60 MG/1
60 TABLET, FILM COATED, EXTENDED RELEASE ORAL DAILY
Qty: 90 TABLET | Refills: 3 | Status: SHIPPED | OUTPATIENT
Start: 2023-03-08 | End: 2023-06-06

## 2023-03-08 RX ORDER — LOSARTAN POTASSIUM 100 MG/1
100 TABLET ORAL DAILY
Qty: 90 TABLET | Refills: 3 | Status: SHIPPED | OUTPATIENT
Start: 2023-03-08 | End: 2023-06-06

## 2023-03-08 RX ORDER — CARBOXYMETHYLCELLULOSE SODIUM 5 MG/ML
SOLUTION/ DROPS OPHTHALMIC
COMMUNITY
Start: 2022-08-29 | End: 2023-03-08 | Stop reason: SDUPTHER

## 2023-03-08 RX ORDER — CARBOXYMETHYLCELLULOSE SODIUM 5 MG/ML
SOLUTION/ DROPS OPHTHALMIC
COMMUNITY
Start: 2023-03-03

## 2023-03-08 RX ORDER — ATENOLOL 50 MG/1
50 TABLET ORAL DAILY
Qty: 30 TABLET | Refills: 3 | Status: SHIPPED | OUTPATIENT
Start: 2023-03-08 | End: 2023-06-15

## 2023-03-08 RX ORDER — OMEPRAZOLE 20 MG/1
20 CAPSULE, DELAYED RELEASE ORAL DAILY
Qty: 90 CAPSULE | Refills: 3 | Status: SHIPPED | OUTPATIENT
Start: 2023-03-08 | End: 2023-06-06

## 2023-03-08 RX ORDER — SIMVASTATIN 40 MG
40 TABLET ORAL NIGHTLY
Qty: 90 TABLET | Refills: 3 | Status: SHIPPED | OUTPATIENT
Start: 2023-03-08 | End: 2023-06-06

## 2023-03-08 SDOH — ECONOMIC STABILITY: FOOD INSECURITY: WITHIN THE PAST 12 MONTHS, YOU WORRIED THAT YOUR FOOD WOULD RUN OUT BEFORE YOU GOT MONEY TO BUY MORE.: PATIENT DECLINED

## 2023-03-08 SDOH — ECONOMIC STABILITY: HOUSING INSECURITY
IN THE LAST 12 MONTHS, WAS THERE A TIME WHEN YOU DID NOT HAVE A STEADY PLACE TO SLEEP OR SLEPT IN A SHELTER (INCLUDING NOW)?: NO

## 2023-03-08 SDOH — ECONOMIC STABILITY: FOOD INSECURITY: WITHIN THE PAST 12 MONTHS, THE FOOD YOU BOUGHT JUST DIDN'T LAST AND YOU DIDN'T HAVE MONEY TO GET MORE.: PATIENT DECLINED

## 2023-03-08 SDOH — ECONOMIC STABILITY: INCOME INSECURITY: HOW HARD IS IT FOR YOU TO PAY FOR THE VERY BASICS LIKE FOOD, HOUSING, MEDICAL CARE, AND HEATING?: SOMEWHAT HARD

## 2023-03-08 ASSESSMENT — PATIENT HEALTH QUESTIONNAIRE - PHQ9
SUM OF ALL RESPONSES TO PHQ9 QUESTIONS 1 & 2: 0
1. LITTLE INTEREST OR PLEASURE IN DOING THINGS: 0
SUM OF ALL RESPONSES TO PHQ QUESTIONS 1-9: 0
2. FEELING DOWN, DEPRESSED OR HOPELESS: 0

## 2023-03-08 ASSESSMENT — ENCOUNTER SYMPTOMS
EYE DISCHARGE: 0
WHEEZING: 0
COUGH: 0
EYE ITCHING: 0
SORE THROAT: 0
NAUSEA: 0
BACK PAIN: 0
CHOKING: 0
EYE REDNESS: 0
ABDOMINAL PAIN: 0
FACIAL SWELLING: 0
RECTAL PAIN: 0
VOMITING: 0
CONSTIPATION: 0
DIARRHEA: 0
ANAL BLEEDING: 0
APNEA: 0
BLOOD IN STOOL: 0
EYE PAIN: 0
SHORTNESS OF BREATH: 0
CHEST TIGHTNESS: 0

## 2023-03-08 NOTE — PROGRESS NOTES
Osmel Crawford     Chief Complaint   Patient presents with    Follow-up     6 month follow up     /78 (Site: Right Upper Arm, Position: Sitting, Cuff Size: Medium Adult)   Pulse 94   Temp 97.6 °F (36.4 °C) (Temporal)   Resp 14   Ht 5' 3\" (1.6 m)   Wt 141 lb (64 kg)   SpO2 98%   BMI 24.98 kg/m²         HPI:  Osmel Crawford is here for follow up and medications  refill has been stable with no ER or Urgent call visits     Past Medical History:   Diagnosis Date    Arthritis     Cancer ([address]     rectal    GERD (gastroesophageal reflux disease)     Hypercholesterolemia     Hypertension      No past surgical history on file. Social History     Tobacco Use    Smoking status: Former     Packs/day: 1.00     Years: 10.00     Pack years: 10.00     Types: Cigarettes     Quit date: 1983     Years since quittin.2    Smokeless tobacco: Never   Substance Use Topics    Alcohol use: No       Family History   Problem Relation Age of Onset    No Known Problems Father     No Known Problems Mother        Review of Systems   Constitutional:  Negative for activity change, appetite change, chills, diaphoresis, fatigue and fever. HENT:  Negative for congestion, ear discharge, ear pain, facial swelling, hearing loss and sore throat. Eyes:  Negative for pain, discharge, redness and itching. Respiratory:  Negative for apnea, cough, choking, chest tightness, shortness of breath and wheezing. Gastrointestinal:  Negative for abdominal pain, anal bleeding, blood in stool, constipation, diarrhea, nausea, rectal pain and vomiting. Endocrine: Negative for cold intolerance and heat intolerance. Genitourinary:  Negative for decreased urine volume, difficulty urinating, dysuria, flank pain, frequency, hematuria, penile swelling and urgency. Musculoskeletal:  Negative for arthralgias, back pain, gait problem, joint swelling, myalgias, neck pain and neck stiffness. Skin:  Negative for rash. Neurological:  Negative for dizziness, weakness, light-headedness and headaches. Psychiatric/Behavioral:  Positive for sleep disturbance. Negative for agitation, behavioral problems, confusion and suicidal ideas. The patient is not nervous/anxious. Physical Exam  Vitals and nursing note reviewed. Constitutional:       General: He is not in acute distress. Appearance: Normal appearance. He is normal weight. He is not ill-appearing, toxic-appearing or diaphoretic. HENT:      Right Ear: There is no impacted cerumen. Left Ear: There is no impacted cerumen. Nose: No congestion or rhinorrhea. Mouth/Throat:      Pharynx: No oropharyngeal exudate or posterior oropharyngeal erythema. Eyes:      General: No scleral icterus. Right eye: No discharge. Left eye: No discharge. Cardiovascular:      Pulses: Normal pulses. Heart sounds: Normal heart sounds. No murmur heard. Pulmonary:      Effort: Pulmonary effort is normal. No respiratory distress. Breath sounds: Normal breath sounds. No stridor. No wheezing or rhonchi. Chest:      Chest wall: No tenderness. Abdominal:      General: There is no distension. Palpations: There is no mass. Tenderness: There is no abdominal tenderness. There is no right CVA tenderness, left CVA tenderness, guarding or rebound. Hernia: No hernia is present. Musculoskeletal:         General: No swelling, tenderness or deformity. Normal range of motion. Cervical back: Normal range of motion and neck supple. No rigidity or tenderness. Right lower leg: No edema. Left lower leg: No edema. Skin:     General: Skin is warm and dry. Coloration: Skin is not jaundiced. Findings: No bruising, erythema, lesion or rash. Neurological:      General: No focal deficit present. Mental Status: He is alert and oriented to person, place, and time. Cranial Nerves: No cranial nerve deficit.       Sensory: No sensory deficit. Motor: No weakness. Coordination: Coordination normal.      Gait: Gait normal.      Deep Tendon Reflexes: Reflexes normal.   Psychiatric:         Mood and Affect: Mood normal.         Behavior: Behavior normal.         Thought Content: Thought content normal.         Judgment: Judgment normal.        Assessment and plan     Plan of care has been discussed with the patient, he agrees to the plan and verbalized understanding. All his questions were answered  More than 50% of the time spent in this visit was counseling the patient about  illness and treatment options         ICD-10-CM    1. Essential (primary) hypertension  I10 Comprehensive Metabolic Panel   Blood pressures well controlled on current medications  NIFEdipine (ADALAT CC) 60 MG extended release tablet     losartan (COZAAR) 100 MG tablet     atenolol (TENORMIN) 50 MG tablet      2. Malignant neoplasm of rectum ([facility] [address]  C20    He has been in remission is not following up with oncologist   3. Thrombocytopenia, unspecified (HCC)  D69.6 Hemoglobin A1C      4. Convulsions, unspecified convulsion type ([facility] [address]  R56.9    Is stable on current medication and following up with neurologist   5. Chronic kidney disease, stage 3a ([facility] [address]  N18.31       6. Gastroesophageal reflux disease without esophagitis  K21.9 omeprazole (PRILOSEC) 20 MG delayed release capsule   Stable on omeprazole 20 mg daily   7. Mixed hyperlipidemia  E78.2 simvastatin (ZOCOR) 40 MG tablet   He is adherent to simvastatin 40 mg daily   8. Gout, arthritis  M10.9 allopurinol (ZYLOPRIM) 100 MG tablet   He is on allopurinol with no recent exacerbations   9.  Prediabetes   R73.03 Hemoglobin A1C   He is not on any medication on lifestyle modifications      Current Outpatient Medications   Medication Sig Dispense Refill    REFRESH PLUS 0.5 % SOLN ophthalmic solution       latanoprost (XALATAN) 0.005 % ophthalmic solution Apply to eye      acetaminophen (TYLENOL) 500 MG tablet Take by mouth every 6 hours as needed      levETIRAcetam (KEPPRA) 250 MG tablet Take 250 mg by mouth 2 times daily      simvastatin (ZOCOR) 40 MG tablet Take 1 tablet by mouth nightly 90 tablet 3    omeprazole (PRILOSEC) 20 MG delayed release capsule Take 1 capsule by mouth daily 90 capsule 3    NIFEdipine (ADALAT CC) 60 MG extended release tablet Take 1 tablet by mouth daily 90 tablet 3    losartan (COZAAR) 100 MG tablet Take 1 tablet by mouth daily 90 tablet 3    atenolol (TENORMIN) 50 MG tablet Take 1 tablet by mouth daily 30 tablet 3    allopurinol (ZYLOPRIM) 100 MG tablet Take 1 tablet by mouth daily 90 tablet 3    ergocalciferol (ERGOCALCIFEROL) 1.25 MG (82056 UT) capsule Take 50,000 Units by mouth every 7 days (Patient not taking: Reported on 3/8/2023)       No current facility-administered medications for this visit. Patient Active Problem List    Diagnosis Date Noted    Malignant neoplasm of rectum ([facility] 03/08/2023    Thrombocytopenia, unspecified ([facility] 03/08/2023    CKD (chronic kidney disease) stage 3, GFR 30-59 ml/min ([facility]) 01/04/2019    Essential hypertension 01/04/2019    Gastroesophageal reflux disease without esophagitis 01/04/2019    Seizure ([facility] 01/04/2019    Mixed hyperlipidemia 01/04/2019    Gout, arthritis 01/04/2019    Vitamin D deficiency 01/04/2019    History of rectal cancer 05/31/2013     No results found for this visit on 03/08/23. No visits with results within 3 Month(s) from this visit.    Latest known visit with results is:   Orders Only on 09/26/2022   Component Date Value Ref Range Status    Sodium 09/26/2022 142  136 - 145 mmol/L Final    Potassium 09/26/2022 4.0  3.5 - 5.5 mmol/L Final    Chloride 09/26/2022 107  100 - 111 mmol/L Final    CO2 09/26/2022 28  21 - 32 mmol/L Final    Anion Gap 09/26/2022 7  3.0 - 18 mmol/L Final    Glucose 09/26/2022 128 (A)  74 - 99 mg/dL Final    BUN 09/26/2022 29 (A)  7.0 - 18 MG/DL Final    Creatinine 09/26/2022 1.84 (A)  0.6 - 1.3 MG/DL Final Bun/Cre Ratio 09/26/2022 16  12 - 20   Final    GFR  09/26/2022 43 (A)  >60 ml/min/1.73m2 Final    EGFR IF NonAfrican American 09/26/2022 36 (A)  >60 ml/min/1.73m2 Final    Comment: (NOTE)  Estimated GFR is calculated using the Modification of Diet in Renal   Disease (MDRD) Study equation, reported for both  Americans   (GFRAA) and non- Americans (GFRNA), and normalized to 1.73m2   body surface area. The physician must decide which value applies to   the patient. The MDRD study equation should only be used in   individuals age 25 or older. It has not been validated for the   following: pregnant women, patients with serious comorbid conditions,   or on certain medications, or persons with extremes of body size,   muscle mass, or nutritional status. Calcium 09/26/2022 9.7  8.5 - 10.1 MG/DL Final    Total Bilirubin 09/26/2022 0.6  0.2 - 1.0 MG/DL Final    ALT 09/26/2022 19  16 - 61 U/L Final    AST 09/26/2022 17  10 - 38 U/L Final    Alkaline Phosphatase 09/26/2022 80  45 - 117 U/L Final    Total Protein 09/26/2022 7.0  6.4 - 8.2 g/dL Final    Albumin 09/26/2022 4.0  3.4 - 5.0 g/dL Final    Globulin 09/26/2022 3.0  2.0 - 4.0 g/dL Final    Albumin/Globulin Ratio 09/26/2022 1.3  0.8 - 1.7   Final    Uric Acid, Serum 09/26/2022 7.5 (A)  2.6 - 7.2 MG/DL Final    Comment: The drugs N-acetylcysteine (NAC) and  Metamiszole have been found to cause falsely  low results in this chemical assay. Please  be sure to submit blood samples obtained  BEFORE administration of either of these  drugs to assure correct results.       WBC 09/26/2022 8.0  4.6 - 13.2 K/uL Final    RBC 09/26/2022 4.34 (A)  4.35 - 5.65 M/uL Final    Hemoglobin 09/26/2022 13.0  13.0 - 16.0 g/dL Final    Hematocrit 09/26/2022 40.3  36.0 - 48.0 % Final    MCV 09/26/2022 92.9  78.0 - 100.0 FL Final    MCH 09/26/2022 30.0  24.0 - 34.0 PG Final    MCHC 09/26/2022 32.3  31.0 - 37.0 g/dL Final    RDW 09/26/2022 14.2  11.6 - 14.5 % Final    Platelets 09/26/2022 252  135 - 420 K/uL Final    MPV 09/26/2022 11.3  9.2 - 11.8 FL Final    Nucleated RBCs 09/26/2022 0.0  0  WBC Final    NRBC Absolute 09/26/2022 0.00  0.00 - 0.01 K/uL Final    Neutrophils % 09/26/2022 81 (A)  40 - 73 % Final    Lymphocytes % 09/26/2022 7 (A)  21 - 52 % Final    Monocytes % 09/26/2022 9  3 - 10 % Final    Eosinophils % 09/26/2022 2  0 - 5 % Final    Basophils % 09/26/2022 1  0 - 2 % Final    Immature Granulocytes 09/26/2022 0  0.0 - 0.5 % Final    Neutrophils Absolute 09/26/2022 6.5  1.8 - 8.0 K/UL Final    Lymphocytes Absolute 09/26/2022 0.6 (A)  0.9 - 3.6 K/UL Final    Monocytes Absolute 09/26/2022 0.7  0.05 - 1.2 K/UL Final    Eosinophils Absolute 09/26/2022 0.1  0.0 - 0.4 K/UL Final    Basophils Absolute 09/26/2022 0.0  0.0 - 0.1 K/UL Final    Granulocyte Absolute Count 09/26/2022 0.0  0.00 - 0.04 K/UL Final    Differential Type 09/26/2022 AUTOMATED    Final    Vit D, 25-Hydroxy 09/26/2022 >150.0 (A)  30 - 100 ng/mL Final    Comment: (NOTE)  Deficiency               <20 ng/mL  Insufficiency          20-30 ng/mL  Sufficient             ng/mL  Possible toxicity       >100 ng/mL    The Method used is Siemens Advia Centaur currently standardized to a   Center of Disease Control and Prevention (CDC) certified reference   method. Samples containing fluorescein dye can produce falsely   elevated values when tested with the ADVIA Centaur Vitamin D Assay.   It is recommended that results in the toxic range, >100 ng/mL, be   retested 72 hours post fluorescein exposure.      LIPID PANEL 09/26/2022        Final    Cholesterol, Total 09/26/2022 140  <200 MG/DL Final    Triglycerides 09/26/2022 211 (A)  <150 MG/DL Final    Comment: The drugs N-acetylcysteine (NAC) and  Metamiszole have been found to cause falsely  low results in this chemical assay. Please  be sure to submit blood samples obtained  BEFORE administration of either of these  drugs to assure correct  results. HDL 09/26/2022 36 (A)  40 - 60 MG/DL Final    LDL Calculated 09/26/2022 61.8  0 - 100 MG/DL Final    VLDL Cholesterol Calculated 09/26/2022 42.2  MG/DL Final    Chol/HDL Ratio 09/26/2022 3.9  0 - 5.0   Final    Pros. Spec. Antigen 09/26/2022 2.8  0.0 - 4.0 ng/mL Final    Hemoglobin A1C 09/26/2022 5.9 (A)  4.2 - 5.6 % Final    Comment: (NOTE)  HbA1C Interpretive Ranges  <5.7              Normal  5.7 - 6.4         Consider Prediabetes  >6.5              Consider Diabetes            Return in about 6 months (around 9/8/2023) for medicare wellness.

## 2023-03-08 NOTE — PROGRESS NOTES
Rica Barnes is a [de-identified] y.o. male (: 1945) presenting to address:    Chief Complaint   Patient presents with    Follow-up     6 month follow up       Vitals:    23 0936   BP: 118/78   Pulse: 94   Resp: 14   Temp: 97.6 °F (36.4 °C)   SpO2: 98%       Coordination of Care Questionaire:   1. \"Have you been to the ER, urgent care clinic since your last visit? Hospitalized since your last visit? \" No    2. \"Have you seen or consulted any other health care providers outside of the [address] [place] [address] since your last visit? \" No     3. For patients aged 39-70: Has the patient had a colonoscopy / FIT/ Cologuard? Yes - Care Gap present. Most recent result on file      If the patient is female:    4. For patients aged 41-77: Has the patient had a mammogram within the past 2 years? NA - based on age or sex      11. For patients aged 21-65: Has the patient had a pap smear? NA - based on age or sex    Advanced Directive:   1. Do you have an Advanced Directive? No    2. Would you like information on Advanced Directives?  No

## 2023-07-31 DIAGNOSIS — I10 ESSENTIAL (PRIMARY) HYPERTENSION: ICD-10-CM

## 2023-07-31 RX ORDER — ATENOLOL 50 MG/1
50 TABLET ORAL DAILY
Qty: 90 TABLET | Refills: 2 | Status: SHIPPED | OUTPATIENT
Start: 2023-07-31 | End: 2024-04-26

## 2023-07-31 NOTE — TELEPHONE ENCOUNTER
Pt Called with Rx Refill Request.  Requested Prescriptions     Pending Prescriptions Disp Refills    atenolol (TENORMIN) 50 MG tablet 30 tablet 3     Sig: Take 1 tablet by mouth daily

## 2023-11-30 ENCOUNTER — OFFICE VISIT (OUTPATIENT)
Dept: FAMILY MEDICINE CLINIC | Facility: CLINIC | Age: 78
End: 2023-11-30

## 2023-11-30 VITALS
SYSTOLIC BLOOD PRESSURE: 124 MMHG | DIASTOLIC BLOOD PRESSURE: 74 MMHG | TEMPERATURE: 97.6 F | HEART RATE: 73 BPM | OXYGEN SATURATION: 100 % | HEIGHT: 63 IN | WEIGHT: 141 LBS | BODY MASS INDEX: 24.98 KG/M2 | RESPIRATION RATE: 14 BRPM

## 2023-11-30 DIAGNOSIS — Z00.00 ENCOUNTER FOR MEDICAL EXAMINATION TO ESTABLISH CARE: ICD-10-CM

## 2023-11-30 DIAGNOSIS — I10 ESSENTIAL (PRIMARY) HYPERTENSION: Primary | ICD-10-CM

## 2023-11-30 ASSESSMENT — ENCOUNTER SYMPTOMS
SHORTNESS OF BREATH: 0
CHEST TIGHTNESS: 0
VOMITING: 0
COUGH: 0
DIARRHEA: 0
NAUSEA: 0

## 2023-11-30 NOTE — PROGRESS NOTES
[address]    HISTORY OF PRESENT ILLNESS  Myles Blanco  is a 78 y.o. y.o. male    HYPERTENSION, Essential  Reports Compliance with meds  Denies Chest pain, shortness of breath, lower extremity edema, vision changes, change in urination. Saw nephrology long time ago  Labs stable from     Lab Results   Component Value Date     2023    K 4.4 2023     2023    CO2 26 2023    GLUCOSE 118 (H) 2023    BUN 25 (H) 2023    CREATININE 1.54 (H) 2023    CALCIUM 8.9 2023    ALT 20 2023    AST 16 2023    ALKPHOS 97 2023    LABALBU 4.0 2023             Health Maintenance:    Depression Screen:       3/8/2023     9:41 AM   PHQ-9    Little interest or pleasure in doing things 0   Feeling down, depressed, or hopeless 0   PHQ-2 Score 0   PHQ-9 Total Score 0        HCV Screen:   Lab Results   Component Value Date    HEPCAB <0.02 2021    HEPCAB Negative 2021        Colon Cancer Screening: n/a  Prostate Cancer Screening:   Lab Results   Component Value Date    PSA 2.8 2022        Smoking Status:   Tobacco Use      Smoking status: Former        Packs/day: 1.00        Years: 10.00        Additional pack years: 0.00        Total pack years: 10.00        Types: Cigarettes        Quit date: 1983        Years since quittin.9      Smokeless tobacco: Never     Lung Cancer Screening:  CT Low Dose n/a  AAA Screening: n/a    Immunizations:  Flu vaccine- Recommended every , needs today  COVID vaccine primary series- complete, needs booster  Tetanus- Tdap   Shingrix- series not completed  Pneumovax 23-  N/A  Prevnar 21- at age 70        Mr#: [id number]      Past Medical History:   Diagnosis Date    Arthritis     Cancer ([address])     rectal    GERD (gastroesophageal reflux disease)     Hypercholesterolemia     Hypertension        History reviewed. No pertinent surgical history.     Family History   Problem

## 2023-12-30 PROBLEM — Z00.00 ENCOUNTER FOR MEDICAL EXAMINATION TO ESTABLISH CARE: Status: RESOLVED | Noted: 2023-11-30 | Resolved: 2023-12-30

## 2024-03-04 DIAGNOSIS — K21.9 GASTROESOPHAGEAL REFLUX DISEASE WITHOUT ESOPHAGITIS: ICD-10-CM

## 2024-03-04 DIAGNOSIS — I10 ESSENTIAL (PRIMARY) HYPERTENSION: ICD-10-CM

## 2024-03-04 NOTE — TELEPHONE ENCOUNTER
Pt called with Rx Refill Request.  Requested Prescriptions     Pending Prescriptions Disp Refills    losartan (COZAAR) 100 MG tablet 90 tablet 3     Sig: Take 1 tablet by mouth daily    omeprazole (PRILOSEC) 20 MG delayed release capsule 90 capsule 3     Sig: Take 1 capsule by mouth daily

## 2024-03-08 RX ORDER — LOSARTAN POTASSIUM 100 MG/1
100 TABLET ORAL DAILY
Qty: 90 TABLET | Refills: 1 | Status: SHIPPED | OUTPATIENT
Start: 2024-03-08 | End: 2024-06-06

## 2024-03-08 RX ORDER — OMEPRAZOLE 20 MG/1
20 CAPSULE, DELAYED RELEASE ORAL DAILY
Qty: 90 CAPSULE | Refills: 1 | Status: SHIPPED | OUTPATIENT
Start: 2024-03-08 | End: 2024-06-06

## 2024-04-01 DIAGNOSIS — M10.9 GOUT, ARTHRITIS: ICD-10-CM

## 2024-04-01 DIAGNOSIS — E78.2 MIXED HYPERLIPIDEMIA: ICD-10-CM

## 2024-04-01 DIAGNOSIS — I10 ESSENTIAL (PRIMARY) HYPERTENSION: ICD-10-CM

## 2024-04-01 NOTE — TELEPHONE ENCOUNTER
Pt called requesting refills to be sent to [facility].    Requested Prescriptions     Pending Prescriptions Disp Refills    NIFEdipine (ADALAT CC) 60 MG extended release tablet 90 tablet 3     Sig: Take 1 tablet by mouth daily    allopurinol (ZYLOPRIM) 100 MG tablet 90 tablet 3     Sig: Take 1 tablet by mouth daily    simvastatin (ZOCOR) 40 MG tablet 90 tablet 3     Sig: Take 1 tablet by mouth nightly

## 2024-04-09 RX ORDER — ALLOPURINOL 100 MG/1
50 TABLET ORAL DAILY
Qty: 60 TABLET | Refills: 1 | Status: SHIPPED | OUTPATIENT
Start: 2024-04-09 | End: 2024-12-05

## 2024-04-09 RX ORDER — SIMVASTATIN 40 MG
40 TABLET ORAL NIGHTLY
Qty: 90 TABLET | Refills: 1 | Status: SHIPPED | OUTPATIENT
Start: 2024-04-09 | End: 2024-10-06

## 2024-04-30 DIAGNOSIS — I10 ESSENTIAL (PRIMARY) HYPERTENSION: ICD-10-CM

## 2024-04-30 RX ORDER — ATENOLOL 50 MG/1
50 TABLET ORAL DAILY
Qty: 90 TABLET | Refills: 2 | Status: SHIPPED | OUTPATIENT
Start: 2024-04-30 | End: 2025-01-25

## 2024-04-30 NOTE — TELEPHONE ENCOUNTER
Med Refill Request    Medication:atenolol (TENORMIN) 50 MG tablet [[id number]]  ENDED   Last filled: 07/31/2023  Qty: 90    RF:2   Sig: Take 1 tablet by mouth daily     Future Appointments   Date Time Provider Department Center   5/30/2024 11:00 AM Rosy Purdy DO [facility]

## 2024-05-30 ENCOUNTER — HOSPITAL ENCOUNTER (OUTPATIENT)
Facility: HOSPITAL | Age: 79
Setting detail: SPECIMEN
Discharge: HOME | End: 2024-05-30
Payer: MEDICARE

## 2024-05-30 ENCOUNTER — OFFICE VISIT (OUTPATIENT)
Dept: FAMILY MEDICINE CLINIC | Facility: CLINIC | Age: 79
End: 2024-05-30

## 2024-05-30 VITALS
BODY MASS INDEX: 25.52 KG/M2 | HEIGHT: 63 IN | DIASTOLIC BLOOD PRESSURE: 64 MMHG | SYSTOLIC BLOOD PRESSURE: 115 MMHG | WEIGHT: 144 LBS | RESPIRATION RATE: 14 BRPM | HEART RATE: 67 BPM | TEMPERATURE: 97.4 F | OXYGEN SATURATION: 96 %

## 2024-05-30 DIAGNOSIS — R56.9 CONVULSIONS, UNSPECIFIED CONVULSION TYPE (HCC): ICD-10-CM

## 2024-05-30 DIAGNOSIS — I10 ESSENTIAL HYPERTENSION: ICD-10-CM

## 2024-05-30 DIAGNOSIS — Z00.00 MEDICARE ANNUAL WELLNESS VISIT, SUBSEQUENT: Primary | ICD-10-CM

## 2024-05-30 DIAGNOSIS — M10.9 GOUT, ARTHRITIS: ICD-10-CM

## 2024-05-30 DIAGNOSIS — N18.31 CHRONIC KIDNEY DISEASE, STAGE 3A (HCC): ICD-10-CM

## 2024-05-30 DIAGNOSIS — Z13.6 SCREENING FOR CARDIOVASCULAR CONDITION: ICD-10-CM

## 2024-05-30 DIAGNOSIS — R73.03 PREDIABETES: ICD-10-CM

## 2024-05-30 DIAGNOSIS — D69.6 THROMBOCYTOPENIA, UNSPECIFIED (HCC): ICD-10-CM

## 2024-05-30 DIAGNOSIS — Z23 NEED FOR PROPHYLACTIC VACCINATION AND INOCULATION AGAINST VARICELLA: ICD-10-CM

## 2024-05-30 PROBLEM — C20 MALIGNANT NEOPLASM OF RECTUM (HCC): Status: RESOLVED | Noted: 2023-03-08 | Resolved: 2024-05-30

## 2024-05-30 LAB
ANION GAP SERPL CALC-SCNC: 6 MMOL/L (ref 3–18)
BUN SERPL-MCNC: 30 MG/DL (ref 7–18)
BUN/CREAT SERPL: 19 (ref 12–20)
CALCIUM SERPL-MCNC: 9.2 MG/DL (ref 8.5–10.1)
CHLORIDE SERPL-SCNC: 111 MMOL/L (ref 100–111)
CHOLEST SERPL-MCNC: 126 MG/DL
CO2 SERPL-SCNC: 24 MMOL/L (ref 21–32)
CREAT SERPL-MCNC: 1.55 MG/DL (ref 0.6–1.3)
CREAT UR-MCNC: 154 MG/DL (ref 30–125)
ERYTHROCYTE [DISTWIDTH] IN BLOOD BY AUTOMATED COUNT: 13.7 % (ref 11.6–14.5)
EST. AVERAGE GLUCOSE BLD GHB EST-MCNC: 120 MG/DL
GLUCOSE SERPL-MCNC: 121 MG/DL (ref 74–99)
HBA1C MFR BLD: 5.8 % (ref 4.2–5.6)
HCT VFR BLD AUTO: 39.6 % (ref 36–48)
HDLC SERPL-MCNC: 41 MG/DL (ref 40–60)
HDLC SERPL: 3.1 (ref 0–5)
HGB BLD-MCNC: 12.7 G/DL (ref 13–16)
LDLC SERPL CALC-MCNC: 49.4 MG/DL (ref 0–100)
LIPID PANEL: ABNORMAL
MCH RBC QN AUTO: 29.5 PG (ref 24–34)
MCHC RBC AUTO-ENTMCNC: 32.1 G/DL (ref 31–37)
MCV RBC AUTO: 91.9 FL (ref 78–100)
MICROALBUMIN UR-MCNC: 1.68 MG/DL (ref 0–3)
MICROALBUMIN/CREAT UR-RTO: 11 MG/G (ref 0–30)
NRBC # BLD: 0 K/UL (ref 0–0.01)
NRBC BLD-RTO: 0 PER 100 WBC
PLATELET # BLD AUTO: 235 K/UL (ref 135–420)
PMV BLD AUTO: 10.7 FL (ref 9.2–11.8)
POTASSIUM SERPL-SCNC: 4.4 MMOL/L (ref 3.5–5.5)
RBC # BLD AUTO: 4.31 M/UL (ref 4.35–5.65)
SODIUM SERPL-SCNC: 141 MMOL/L (ref 136–145)
TRIGL SERPL-MCNC: 178 MG/DL
VLDLC SERPL CALC-MCNC: 35.6 MG/DL
WBC # BLD AUTO: 7.9 K/UL (ref 4.6–13.2)

## 2024-05-30 PROCEDURE — 83036 HEMOGLOBIN GLYCOSYLATED A1C: CPT

## 2024-05-30 PROCEDURE — 82570 ASSAY OF URINE CREATININE: CPT

## 2024-05-30 PROCEDURE — 85027 COMPLETE CBC AUTOMATED: CPT

## 2024-05-30 PROCEDURE — 80061 LIPID PANEL: CPT

## 2024-05-30 PROCEDURE — 80048 BASIC METABOLIC PNL TOTAL CA: CPT

## 2024-05-30 PROCEDURE — 82043 UR ALBUMIN QUANTITATIVE: CPT

## 2024-05-30 PROCEDURE — 36415 COLL VENOUS BLD VENIPUNCTURE: CPT

## 2024-05-30 RX ORDER — ALLOPURINOL 100 MG/1
50 TABLET ORAL DAILY
Qty: 90 TABLET | Refills: 1 | Status: SHIPPED | OUTPATIENT
Start: 2024-05-30 | End: 2025-05-25

## 2024-05-30 SDOH — ECONOMIC STABILITY: FOOD INSECURITY: WITHIN THE PAST 12 MONTHS, YOU WORRIED THAT YOUR FOOD WOULD RUN OUT BEFORE YOU GOT MONEY TO BUY MORE.: PATIENT DECLINED

## 2024-05-30 SDOH — ECONOMIC STABILITY: FOOD INSECURITY: WITHIN THE PAST 12 MONTHS, THE FOOD YOU BOUGHT JUST DIDN'T LAST AND YOU DIDN'T HAVE MONEY TO GET MORE.: PATIENT DECLINED

## 2024-05-30 SDOH — ECONOMIC STABILITY: HOUSING INSECURITY
IN THE LAST 12 MONTHS, WAS THERE A TIME WHEN YOU DID NOT HAVE A STEADY PLACE TO SLEEP OR SLEPT IN A SHELTER (INCLUDING NOW)?: PATIENT DECLINED

## 2024-05-30 SDOH — ECONOMIC STABILITY: INCOME INSECURITY: HOW HARD IS IT FOR YOU TO PAY FOR THE VERY BASICS LIKE FOOD, HOUSING, MEDICAL CARE, AND HEATING?: PATIENT DECLINED

## 2024-05-30 ASSESSMENT — ENCOUNTER SYMPTOMS
CHEST TIGHTNESS: 0
VOMITING: 0
SHORTNESS OF BREATH: 0
DIARRHEA: 0
COUGH: 0
NAUSEA: 0

## 2024-05-30 ASSESSMENT — PATIENT HEALTH QUESTIONNAIRE - PHQ9
SUM OF ALL RESPONSES TO PHQ9 QUESTIONS 1 & 2: 0
2. FEELING DOWN, DEPRESSED OR HOPELESS: NOT AT ALL
1. LITTLE INTEREST OR PLEASURE IN DOING THINGS: NOT AT ALL
SUM OF ALL RESPONSES TO PHQ QUESTIONS 1-9: 0

## 2024-05-30 ASSESSMENT — LIFESTYLE VARIABLES
HOW MANY STANDARD DRINKS CONTAINING ALCOHOL DO YOU HAVE ON A TYPICAL DAY: PATIENT DOES NOT DRINK
HOW OFTEN DO YOU HAVE A DRINK CONTAINING ALCOHOL: NEVER

## 2024-05-30 NOTE — PATIENT INSTRUCTIONS
Learning About Being Active as an Older Adult  Why is being active important as you get older?     Being active is one of the best things you can do for your health. And it's never too late to start. Being active--or getting active, if you aren't already--has definite benefits. It can:  Give you more energy,  Keep your mind sharp.  Improve balance to reduce your risk of falls.  Help you manage chronic illness with fewer medicines.  No matter how old you are, how fit you are, or what health problems you have, there is a form of activity that will work for you. And the more physical activity you can do, the better your overall health will be.  What kinds of activity can help you stay healthy?  Being more active will make your daily activities easier. Physical activity includes planned exercise and things you do in daily life. There are four types of activity:  Aerobic.  Doing aerobic activity makes your heart and lungs strong.  Includes walking, dancing, and gardening.  Aim for at least 2½ hours spread throughout the week.  It improves your energy and can help you sleep better.  Muscle-strengthening.  This type of activity can help maintain muscle and strengthen bones.  Includes climbing stairs, using resistance bands, and lifting or carrying heavy loads.  Aim for at least twice a week.  It can help protect the knees and other joints.  Stretching.  Stretching gives you better range of motion in joints and muscles.  Includes upper arm stretches, calf stretches, and gentle yoga.  Aim for at least twice a week, preferably after your muscles are warmed up from other activities.  It can help you function better in daily life.  Balancing.  This helps you stay coordinated and have good posture.  Includes heel-to-toe walking, emmy chi, and certain types of yoga.  Aim for at least 3 days a week.  It can reduce your risk of falling.  Even if you have a hard time meeting the recommendations, it's better to be more active

## 2024-05-30 NOTE — PROGRESS NOTES
Mich Hernandez is a 79 y.o. male (: 1945) presenting to address:    Chief Complaint   Patient presents with    Medicare AWV       Vitals:    24 1104   BP: 115/64   Pulse: 67   Resp: 14   Temp: 97.4 °F (36.3 °C)   SpO2: 96%       \"Have you been to the ER, urgent care clinic since your last visit?  Hospitalized since your last visit?\"    NO    “Have you seen or consulted any other health care providers outside of [facility] since your last visit?”    NO

## 2024-05-30 NOTE — PROGRESS NOTES
Medicare Annual Wellness Visit    Mich Hernandez is here for Medicare AWV    Assessment & Plan   Medicare annual wellness visit, subsequent  Need for prophylactic vaccination and inoculation against varicella  -     zoster recombinant adjuvanted vaccine (SHINGRIX) 50 MCG/0.5ML SUSR injection; Inject 0.5 mLs into the muscle once for 1 dose, Disp-0.5 mL, R-0Print  Screening for cardiovascular condition  -     Lipid Panel; Future  Recommendations for Preventive Services Due: see orders and patient instructions/AVS.  Recommended screening schedule for the next 5-10 years is provided to the patient in written form: see Patient Instructions/AVS.     Return in 6 months (on 11/30/2024).     Subjective       Patient's complete Health Risk Assessment and screening values have been reviewed and are found in Flowsheets. The following problems were reviewed today and where indicated follow up appointments were made and/or referrals ordered.    Positive Risk Factor Screenings with Interventions:                Activity, Diet, and Weight:  On average, how many days per week do you engage in moderate to strenuous exercise (like a brisk walk)?: 0 days  On average, how many minutes do you engage in exercise at this level?: 0 min    Do you eat balanced/healthy meals regularly?: Yes    Body mass index is 25.51 kg/m².      Inactivity Interventions:  See AVS for additional education material        Dentist Screen:  Have you seen the dentist within the past year?: (!) No    Intervention:  Advised to schedule with their dentist        Advanced Directives:  Do you have a Living Will?: (!) No    Intervention:                       Objective   Vitals:    05/30/24 1104   BP: 115/64   Site: Right Upper Arm   Position: Sitting   Cuff Size: Medium Adult   Pulse: 67   Resp: 14   Temp: 97.4 °F (36.3 °C)   TempSrc: Temporal   SpO2: 96%   Weight: 65.3 kg (144 lb)   Height: 1.6 m (5' 3\")      Body mass index is 25.51 kg/m².               No Known

## 2024-06-05 ENCOUNTER — TELEPHONE (OUTPATIENT)
Dept: FAMILY MEDICINE CLINIC | Facility: CLINIC | Age: 79
End: 2024-06-05

## 2024-06-05 NOTE — TELEPHONE ENCOUNTER
Left message for pt to return call to discuss. Discussing in result management.   Referral has been pended please approve if appropriate    Managed care please assist with referral

## 2024-08-14 ENCOUNTER — OFFICE VISIT (OUTPATIENT)
Dept: FAMILY MEDICINE CLINIC | Facility: CLINIC | Age: 79
End: 2024-08-14
Payer: MEDICARE

## 2024-08-14 VITALS
RESPIRATION RATE: 14 BRPM | OXYGEN SATURATION: 98 % | SYSTOLIC BLOOD PRESSURE: 119 MMHG | HEIGHT: 63 IN | DIASTOLIC BLOOD PRESSURE: 68 MMHG | HEART RATE: 72 BPM | TEMPERATURE: 97.2 F | WEIGHT: 145 LBS | BODY MASS INDEX: 25.69 KG/M2

## 2024-08-14 DIAGNOSIS — Z01.818 PREOP EXAMINATION: Primary | ICD-10-CM

## 2024-08-14 PROCEDURE — 1123F ACP DISCUSS/DSCN MKR DOCD: CPT | Performed by: STUDENT IN AN ORGANIZED HEALTH CARE EDUCATION/TRAINING PROGRAM

## 2024-08-14 PROCEDURE — 99213 OFFICE O/P EST LOW 20 MIN: CPT | Performed by: STUDENT IN AN ORGANIZED HEALTH CARE EDUCATION/TRAINING PROGRAM

## 2024-08-14 PROCEDURE — 1036F TOBACCO NON-USER: CPT | Performed by: STUDENT IN AN ORGANIZED HEALTH CARE EDUCATION/TRAINING PROGRAM

## 2024-08-14 PROCEDURE — 3078F DIAST BP <80 MM HG: CPT | Performed by: STUDENT IN AN ORGANIZED HEALTH CARE EDUCATION/TRAINING PROGRAM

## 2024-08-14 PROCEDURE — 3074F SYST BP LT 130 MM HG: CPT | Performed by: STUDENT IN AN ORGANIZED HEALTH CARE EDUCATION/TRAINING PROGRAM

## 2024-08-14 PROCEDURE — G8427 DOCREV CUR MEDS BY ELIG CLIN: HCPCS | Performed by: STUDENT IN AN ORGANIZED HEALTH CARE EDUCATION/TRAINING PROGRAM

## 2024-08-14 PROCEDURE — G8419 CALC BMI OUT NRM PARAM NOF/U: HCPCS | Performed by: STUDENT IN AN ORGANIZED HEALTH CARE EDUCATION/TRAINING PROGRAM

## 2024-08-14 ASSESSMENT — ENCOUNTER SYMPTOMS
DIARRHEA: 0
COUGH: 0
SHORTNESS OF BREATH: 0
CHEST TIGHTNESS: 0
NAUSEA: 0
VOMITING: 0

## 2024-08-14 NOTE — PROGRESS NOTES
Mich Hernandez is a 79 y.o. male (: 1945) presenting to address:    Chief Complaint   Patient presents with    Pre-op Exam       Vitals:    24 1023   BP: 119/68   Pulse: 72   Resp: 14   Temp: 97.2 °F (36.2 °C)   SpO2: 98%       \"Have you been to the ER, urgent care clinic since your last visit?  Hospitalized since your last visit?\"    NO    “Have you seen or consulted any other health care providers outside of [facility] since your last visit?”    Yes, Eye Doctor and Keppra Doctor

## 2024-08-14 NOTE — PROGRESS NOTES
[facility]    HISTORY OF PRESENT ILLNESS  Mich Hernandez  is a 79 y.o. y.o. male here for preop.    Preop  -sx date:24  -type of sx: cataract sx  -surgeon name: Dr. Horne  -PMH: HTN, CKD stage 3, seizures  -medcations that need to stop prior to sx: none        Mr#: [id number]      Past Medical History:   Diagnosis Date    Arthritis     Cancer (HCC)     rectal    GERD (gastroesophageal reflux disease)     Hypercholesterolemia     Hypertension     Malignant neoplasm of rectum (HCC) 2023       History reviewed. No pertinent surgical history.    Family History   Problem Relation Age of Onset    No Known Problems Father     No Known Problems Mother        No Known Allergies    Social History     Tobacco Use   Smoking Status Former    Current packs/day: 0.00    Average packs/day: 1 pack/day for 10.0 years (10.0 ttl pk-yrs)    Types: Cigarettes    Start date: 1973    Quit date: 1983    Years since quittin.6   Smokeless Tobacco Never       Social History     Substance and Sexual Activity   Alcohol Use No       Immunization History   Administered Date(s) Administered    COVID-19, PFIZER PURPLE top, DILUTE for use, (age 12 y+), 30mcg/0.3mL 2021, 2021, 2021    Influenza, FLUAD, (age 65 y+), Adjuvanted, 0.5mL 2022, 2023    Influenza, High Dose (Fluzone 65 yrs and older) 2021    Influenza, Triv, inactivated, subunit, adjuvanted, IM (Fluad 65 yrs and older) 10/23/2019    Pneumococcal, PCV-13, PREVNAR 13, (age 6w+), IM, 0.5mL 2019    Pneumococcal, PPSV23, PNEUMOVAX 23, (age 2y+), SC/IM, 0.5mL 2021    TDaP, ADACEL (age 10y-64y), BOOSTRIX (age 10y+), IM, 0.5mL 2023    Zoster Recombinant (Shingrix) 2024       Patient Active Problem List   Diagnosis    CKD (chronic kidney disease) stage 3, GFR 30-59 ml/min (HCC)    Essential hypertension    History of rectal cancer    Gastroesophageal reflux disease without esophagitis

## 2024-08-27 DIAGNOSIS — K21.9 GASTROESOPHAGEAL REFLUX DISEASE WITHOUT ESOPHAGITIS: ICD-10-CM

## 2024-08-27 DIAGNOSIS — I10 ESSENTIAL (PRIMARY) HYPERTENSION: ICD-10-CM

## 2024-08-27 NOTE — TELEPHONE ENCOUNTER
Pt called with Rx Refill Request.  Requested Prescriptions     Pending Prescriptions Disp Refills    losartan (COZAAR) 100 MG tablet 90 tablet 1     Sig: Take 1 tablet by mouth daily    omeprazole (PRILOSEC) 20 MG delayed release capsule 90 capsule 1     Sig: Take 1 capsule by mouth daily

## 2024-08-28 RX ORDER — LOSARTAN POTASSIUM 100 MG/1
100 TABLET ORAL DAILY
Qty: 90 TABLET | Refills: 1 | Status: SHIPPED | OUTPATIENT
Start: 2024-08-28 | End: 2024-11-26

## 2024-09-04 NOTE — TELEPHONE ENCOUNTER
Patient called requesting medication refill, please advise. Requested Prescriptions     Pending Prescriptions Disp Refills    allopurinoL (ZYLOPRIM) 100 mg tablet 90 Tab 1     Sig: Take 1 Tab by mouth daily.  atenoloL (Tenormin) 100 mg tablet 90 Tab 1     Sig: Take 1 Tab by mouth daily.  simvastatin (Zocor) 40 mg tablet 90 Tab 1     Sig: Take 1 Tab by mouth nightly.  ergocalciferol (ERGOCALCIFEROL) 1,250 mcg (50,000 unit) capsule 12 Cap 1     Sig: Take 1 Cap by mouth every seven (7) days. Results have been released via "[employer] Please verify that these have been viewed by patient. If not, please call patient with results.    I have reviewed your recent results.    TSH NORMAL-ON MED-The TSH is normal. Continue current dose of thyroid medication.    Please let me know if you have any additional questions or concerns about above.

## 2024-10-01 ENCOUNTER — OFFICE VISIT (OUTPATIENT)
Dept: FAMILY MEDICINE CLINIC | Facility: CLINIC | Age: 79
End: 2024-10-01
Payer: MEDICARE

## 2024-10-01 VITALS — HEIGHT: 63 IN | BODY MASS INDEX: 25.69 KG/M2 | TEMPERATURE: 97.6 F | WEIGHT: 145 LBS

## 2024-10-01 DIAGNOSIS — I10 ESSENTIAL (PRIMARY) HYPERTENSION: ICD-10-CM

## 2024-10-01 DIAGNOSIS — E78.2 MIXED HYPERLIPIDEMIA: ICD-10-CM

## 2024-10-01 DIAGNOSIS — Z23 NEEDS FLU SHOT: Primary | ICD-10-CM

## 2024-10-01 PROCEDURE — G0008 ADMIN INFLUENZA VIRUS VAC: HCPCS | Performed by: STUDENT IN AN ORGANIZED HEALTH CARE EDUCATION/TRAINING PROGRAM

## 2024-10-01 PROCEDURE — 90653 IIV ADJUVANT VACCINE IM: CPT | Performed by: STUDENT IN AN ORGANIZED HEALTH CARE EDUCATION/TRAINING PROGRAM

## 2024-10-01 RX ORDER — SIMVASTATIN 40 MG
40 TABLET ORAL NIGHTLY
Qty: 90 TABLET | Refills: 1 | Status: SHIPPED | OUTPATIENT
Start: 2024-10-01 | End: 2025-03-30

## 2024-10-01 NOTE — PROGRESS NOTES
Immunizations Administered       Name Date Dose Route    Influenza, FLUAD, (age 65 y+), IM, Trivalent PF, 0.5mL 10/1/2024 0.5 mL Intramuscular    Site: Deltoid- Left    Lot: [id number]    NDC: 83394-198-29

## 2024-12-02 ENCOUNTER — OFFICE VISIT (OUTPATIENT)
Dept: FAMILY MEDICINE CLINIC | Facility: CLINIC | Age: 79
End: 2024-12-02
Payer: MEDICARE

## 2024-12-02 VITALS
DIASTOLIC BLOOD PRESSURE: 80 MMHG | SYSTOLIC BLOOD PRESSURE: 135 MMHG | RESPIRATION RATE: 16 BRPM | HEART RATE: 71 BPM | BODY MASS INDEX: 26.13 KG/M2 | OXYGEN SATURATION: 99 % | WEIGHT: 142 LBS | TEMPERATURE: 97.6 F | HEIGHT: 62 IN

## 2024-12-02 DIAGNOSIS — N18.31 STAGE 3A CHRONIC KIDNEY DISEASE (HCC): ICD-10-CM

## 2024-12-02 DIAGNOSIS — I10 ESSENTIAL (PRIMARY) HYPERTENSION: ICD-10-CM

## 2024-12-02 DIAGNOSIS — E78.2 MIXED HYPERLIPIDEMIA: ICD-10-CM

## 2024-12-02 DIAGNOSIS — R56.9 SEIZURE (HCC): Primary | ICD-10-CM

## 2024-12-02 PROCEDURE — 1126F AMNT PAIN NOTED NONE PRSNT: CPT | Performed by: STUDENT IN AN ORGANIZED HEALTH CARE EDUCATION/TRAINING PROGRAM

## 2024-12-02 PROCEDURE — 1160F RVW MEDS BY RX/DR IN RCRD: CPT | Performed by: STUDENT IN AN ORGANIZED HEALTH CARE EDUCATION/TRAINING PROGRAM

## 2024-12-02 PROCEDURE — 1036F TOBACCO NON-USER: CPT | Performed by: STUDENT IN AN ORGANIZED HEALTH CARE EDUCATION/TRAINING PROGRAM

## 2024-12-02 PROCEDURE — G8419 CALC BMI OUT NRM PARAM NOF/U: HCPCS | Performed by: STUDENT IN AN ORGANIZED HEALTH CARE EDUCATION/TRAINING PROGRAM

## 2024-12-02 PROCEDURE — 1159F MED LIST DOCD IN RCRD: CPT | Performed by: STUDENT IN AN ORGANIZED HEALTH CARE EDUCATION/TRAINING PROGRAM

## 2024-12-02 PROCEDURE — G8482 FLU IMMUNIZE ORDER/ADMIN: HCPCS | Performed by: STUDENT IN AN ORGANIZED HEALTH CARE EDUCATION/TRAINING PROGRAM

## 2024-12-02 PROCEDURE — 3075F SYST BP GE 130 - 139MM HG: CPT | Performed by: STUDENT IN AN ORGANIZED HEALTH CARE EDUCATION/TRAINING PROGRAM

## 2024-12-02 PROCEDURE — G8427 DOCREV CUR MEDS BY ELIG CLIN: HCPCS | Performed by: STUDENT IN AN ORGANIZED HEALTH CARE EDUCATION/TRAINING PROGRAM

## 2024-12-02 PROCEDURE — 3079F DIAST BP 80-89 MM HG: CPT | Performed by: STUDENT IN AN ORGANIZED HEALTH CARE EDUCATION/TRAINING PROGRAM

## 2024-12-02 PROCEDURE — 99214 OFFICE O/P EST MOD 30 MIN: CPT | Performed by: STUDENT IN AN ORGANIZED HEALTH CARE EDUCATION/TRAINING PROGRAM

## 2024-12-02 PROCEDURE — 1123F ACP DISCUSS/DSCN MKR DOCD: CPT | Performed by: STUDENT IN AN ORGANIZED HEALTH CARE EDUCATION/TRAINING PROGRAM

## 2024-12-02 RX ORDER — TIMOLOL MALEATE 5 MG/ML
1 SOLUTION/ DROPS OPHTHALMIC 2 TIMES DAILY
COMMUNITY
Start: 2024-12-01

## 2024-12-02 RX ORDER — PREDNISOLONE ACETATE 10 MG/ML
1 SUSPENSION/ DROPS OPHTHALMIC
COMMUNITY
Start: 2024-11-21

## 2024-12-02 RX ORDER — MOMETASONE FUROATE 1 MG/G
CREAM TOPICAL
Qty: 45 G | Refills: 1 | Status: SHIPPED | OUTPATIENT
Start: 2024-12-02

## 2024-12-02 RX ORDER — HYDROCORTISONE 25 MG/G
CREAM TOPICAL
Qty: 20 G | Refills: 1 | Status: SHIPPED | OUTPATIENT
Start: 2024-12-02 | End: 2024-12-02

## 2024-12-02 ASSESSMENT — ENCOUNTER SYMPTOMS
COUGH: 0
SHORTNESS OF BREATH: 0
DIARRHEA: 0
CHEST TIGHTNESS: 0
NAUSEA: 0
VOMITING: 0

## 2024-12-02 NOTE — PROGRESS NOTES
Mich Hernandez is a 79 y.o. male (: 1945) presenting to address:    Chief Complaint   Patient presents with    Follow-up       Vitals:    24 1112   BP: 135/80   Pulse: 71   Resp: 16   Temp: 97.6 °F (36.4 °C)   SpO2: 99%       \"Have you been to the ER, urgent care clinic since your last visit?  Hospitalized since your last visit?\"    NO    “Have you seen or consulted any other health care providers outside of [facility] since your last visit?”    Yes, [facility]

## 2024-12-02 NOTE — PROGRESS NOTES
[facility]    HISTORY OF PRESENT ILLNESS  Mich Hernandez  is a 79 y.o. y.o. male here for FU.    HYPERTENSION, Essential  Reports Compliance with meds atenolol 50, losartan 100, nifedipine 60  Denies Chest pain, shortness of breath, lower extremity edema, vision changes, change in urination.    Lab Results   Component Value Date    MALBCR 11 2024     2024    K 4.4 2024     2024    CO2 24 2024    GLUCOSE 121 (H) 2024    BUN 30 (H) 2024    CREATININE 1.55 (H) 2024    CALCIUM 9.2 2024    ALT 20 2023    AST 16 2023    ALKPHOS 97 2023       HLD  -simvastatin 40    Seizures  -taking Keppra 250  -last seizure about 30 years  -follows a neurologist, was told could stop it but scared to stop medication    CKD  -not following nephrology  -last GFR  45  Mr#: [id number]      Past Medical History:   Diagnosis Date    Arthritis     Cancer (HCC)     rectal    GERD (gastroesophageal reflux disease)     Hypercholesterolemia     Hypertension     Malignant neoplasm of rectum (HCC) 2023       History reviewed. No pertinent surgical history.    Family History   Problem Relation Age of Onset    No Known Problems Father     No Known Problems Mother        No Known Allergies    Social History     Tobacco Use   Smoking Status Former    Current packs/day: 0.00    Average packs/day: 1 pack/day for 10.0 years (10.0 ttl pk-yrs)    Types: Cigarettes    Start date: 1973    Quit date: 1983    Years since quittin.9   Smokeless Tobacco Never       Social History     Substance and Sexual Activity   Alcohol Use No       Immunization History   Administered Date(s) Administered    COVID-19, PFIZER PURPLE top, DILUTE for use, (age 12 y+), 30mcg/0.3mL 2021, 2021, 2021    Influenza, FLUAD, (age 65 y+), IM, Quadv, 0.5mL 2022, 2023    Influenza, FLUAD, (age 65 y+), IM, Trivalent PF, 0.5mL

## 2025-01-28 DIAGNOSIS — I10 ESSENTIAL (PRIMARY) HYPERTENSION: ICD-10-CM

## 2025-01-28 RX ORDER — ATENOLOL 50 MG/1
50 TABLET ORAL DAILY
Qty: 90 TABLET | Refills: 1 | Status: SHIPPED | OUTPATIENT
Start: 2025-01-28 | End: 2026-01-23

## 2025-01-28 RX ORDER — ALLOPURINOL 100 MG/1
100 TABLET ORAL DAILY
Qty: 90 TABLET | Refills: 1 | Status: SHIPPED | OUTPATIENT
Start: 2025-01-28 | End: 2026-01-23

## 2025-01-28 NOTE — TELEPHONE ENCOUNTER
Last seen: 10/1/24  Next seen: 6/2/25    Last filled: 4/30/24 Qty 90 w/2 refills         6/5/24 Qty 90 w/1 refills

## 2025-02-14 DIAGNOSIS — K21.9 GASTROESOPHAGEAL REFLUX DISEASE WITHOUT ESOPHAGITIS: ICD-10-CM

## 2025-02-14 DIAGNOSIS — I10 ESSENTIAL (PRIMARY) HYPERTENSION: ICD-10-CM

## 2025-02-14 RX ORDER — LOSARTAN POTASSIUM 100 MG/1
100 TABLET ORAL DAILY
Qty: 90 TABLET | Refills: 1 | Status: SHIPPED | OUTPATIENT
Start: 2025-02-14 | End: 2025-05-15

## 2025-04-01 DIAGNOSIS — E78.2 MIXED HYPERLIPIDEMIA: ICD-10-CM

## 2025-04-01 NOTE — TELEPHONE ENCOUNTER
Pt called requesting refills to be sent to [facility].    Requested Prescriptions     Pending Prescriptions Disp Refills    REFRESH PLUS 0.5 % SOLN ophthalmic solution      simvastatin (ZOCOR) 40 MG tablet 90 tablet 1     Sig: Take 1 tablet by mouth nightly

## 2025-04-02 RX ORDER — SIMVASTATIN 40 MG
40 TABLET ORAL NIGHTLY
Qty: 90 TABLET | Refills: 1 | Status: SHIPPED | OUTPATIENT
Start: 2025-04-02 | End: 2025-09-29

## 2025-04-02 RX ORDER — CARBOXYMETHYLCELLULOSE SODIUM 5 MG/ML
2 SOLUTION/ DROPS OPHTHALMIC 2 TIMES DAILY
Qty: 50 EACH | Refills: 1 | Status: SHIPPED | OUTPATIENT
Start: 2025-04-02

## 2025-04-07 DIAGNOSIS — I10 ESSENTIAL (PRIMARY) HYPERTENSION: ICD-10-CM

## 2025-04-09 NOTE — TELEPHONE ENCOUNTER
Requested Prescriptions     Pending Prescriptions Disp Refills    NIFEdipine (ADALAT CC) 60 MG extended release tablet 90 tablet 1     Sig: Take 1 tablet by mouth daily

## 2025-06-02 ENCOUNTER — HOSPITAL ENCOUNTER (OUTPATIENT)
Facility: HOSPITAL | Age: 80
Setting detail: SPECIMEN
Discharge: HOME | End: 2025-06-05
Payer: MEDICARE

## 2025-06-02 ENCOUNTER — OFFICE VISIT (OUTPATIENT)
Dept: FAMILY MEDICINE CLINIC | Facility: CLINIC | Age: 80
End: 2025-06-02
Payer: MEDICARE

## 2025-06-02 VITALS
SYSTOLIC BLOOD PRESSURE: 126 MMHG | DIASTOLIC BLOOD PRESSURE: 74 MMHG | WEIGHT: 140.8 LBS | HEART RATE: 64 BPM | TEMPERATURE: 98.2 F | HEIGHT: 62 IN | OXYGEN SATURATION: 95 % | BODY MASS INDEX: 25.91 KG/M2 | RESPIRATION RATE: 16 BRPM

## 2025-06-02 DIAGNOSIS — R56.9 SEIZURE (HCC): ICD-10-CM

## 2025-06-02 DIAGNOSIS — Z00.00 MEDICARE ANNUAL WELLNESS VISIT, SUBSEQUENT: ICD-10-CM

## 2025-06-02 DIAGNOSIS — Z13.29 SCREENING FOR ENDOCRINE DISORDER: ICD-10-CM

## 2025-06-02 DIAGNOSIS — Z71.89 ADVANCE CARE PLANNING: ICD-10-CM

## 2025-06-02 DIAGNOSIS — Z13.220 SCREENING FOR LIPID DISORDERS: ICD-10-CM

## 2025-06-02 DIAGNOSIS — Z23 NEED FOR PROPHYLACTIC VACCINATION AND INOCULATION AGAINST VARICELLA: ICD-10-CM

## 2025-06-02 DIAGNOSIS — K21.9 GASTROESOPHAGEAL REFLUX DISEASE WITHOUT ESOPHAGITIS: ICD-10-CM

## 2025-06-02 DIAGNOSIS — N18.31 STAGE 3A CHRONIC KIDNEY DISEASE (HCC): ICD-10-CM

## 2025-06-02 DIAGNOSIS — I10 ESSENTIAL HYPERTENSION: ICD-10-CM

## 2025-06-02 DIAGNOSIS — Z00.00 MEDICARE ANNUAL WELLNESS VISIT, SUBSEQUENT: Primary | ICD-10-CM

## 2025-06-02 LAB
ANION GAP SERPL CALC-SCNC: 12 MMOL/L (ref 3–18)
BUN SERPL-MCNC: 27 MG/DL (ref 6–23)
BUN/CREAT SERPL: 18 (ref 12–20)
CALCIUM SERPL-MCNC: 9.6 MG/DL (ref 8.5–10.1)
CHLORIDE SERPL-SCNC: 107 MMOL/L (ref 98–107)
CHOLEST SERPL-MCNC: 120 MG/DL
CO2 SERPL-SCNC: 23 MMOL/L (ref 21–32)
CREAT SERPL-MCNC: 1.49 MG/DL (ref 0.6–1.3)
CREAT UR-MCNC: 172 MG/DL (ref 30–125)
ERYTHROCYTE [DISTWIDTH] IN BLOOD BY AUTOMATED COUNT: 13.6 % (ref 11.6–14.5)
EST. AVERAGE GLUCOSE BLD GHB EST-MCNC: 132 MG/DL
GLUCOSE SERPL-MCNC: 119 MG/DL (ref 74–108)
HBA1C MFR BLD: 6.2 % (ref 4.2–5.6)
HCT VFR BLD AUTO: 40.6 % (ref 36–48)
HDLC SERPL-MCNC: 37 MG/DL (ref 40–60)
HDLC SERPL: 3.3 (ref 0–5)
HGB BLD-MCNC: 12.8 G/DL (ref 13–16)
LDLC SERPL CALC-MCNC: 62 MG/DL (ref 0–100)
MCH RBC QN AUTO: 28.8 PG (ref 24–34)
MCHC RBC AUTO-ENTMCNC: 31.5 G/DL (ref 31–37)
MCV RBC AUTO: 91.2 FL (ref 78–100)
MICROALBUMIN UR-MCNC: 1.31 MG/DL (ref 0–3)
MICROALBUMIN/CREAT UR-RTO: 8 MG/G (ref 0–30)
NRBC # BLD: 0 K/UL (ref 0–0.01)
NRBC BLD-RTO: 0 PER 100 WBC
PLATELET # BLD AUTO: 376 K/UL (ref 135–420)
PMV BLD AUTO: 10.2 FL (ref 9.2–11.8)
POTASSIUM SERPL-SCNC: 4.8 MMOL/L (ref 3.5–5.5)
RBC # BLD AUTO: 4.45 M/UL (ref 4.35–5.65)
SODIUM SERPL-SCNC: 142 MMOL/L (ref 136–145)
TRIGL SERPL-MCNC: 105 MG/DL (ref 0–150)
VLDLC SERPL CALC-MCNC: 21 MG/DL
WBC # BLD AUTO: 9.6 K/UL (ref 4.6–13.2)

## 2025-06-02 PROCEDURE — 80061 LIPID PANEL: CPT

## 2025-06-02 PROCEDURE — G0439 PPPS, SUBSEQ VISIT: HCPCS | Performed by: STUDENT IN AN ORGANIZED HEALTH CARE EDUCATION/TRAINING PROGRAM

## 2025-06-02 PROCEDURE — 99497 ADVNCD CARE PLAN 30 MIN: CPT | Performed by: STUDENT IN AN ORGANIZED HEALTH CARE EDUCATION/TRAINING PROGRAM

## 2025-06-02 PROCEDURE — 1159F MED LIST DOCD IN RCRD: CPT | Performed by: STUDENT IN AN ORGANIZED HEALTH CARE EDUCATION/TRAINING PROGRAM

## 2025-06-02 PROCEDURE — 85027 COMPLETE CBC AUTOMATED: CPT

## 2025-06-02 PROCEDURE — 80048 BASIC METABOLIC PNL TOTAL CA: CPT

## 2025-06-02 PROCEDURE — 3074F SYST BP LT 130 MM HG: CPT | Performed by: STUDENT IN AN ORGANIZED HEALTH CARE EDUCATION/TRAINING PROGRAM

## 2025-06-02 PROCEDURE — 3078F DIAST BP <80 MM HG: CPT | Performed by: STUDENT IN AN ORGANIZED HEALTH CARE EDUCATION/TRAINING PROGRAM

## 2025-06-02 PROCEDURE — 36415 COLL VENOUS BLD VENIPUNCTURE: CPT

## 2025-06-02 PROCEDURE — 82570 ASSAY OF URINE CREATININE: CPT

## 2025-06-02 PROCEDURE — 1126F AMNT PAIN NOTED NONE PRSNT: CPT | Performed by: STUDENT IN AN ORGANIZED HEALTH CARE EDUCATION/TRAINING PROGRAM

## 2025-06-02 PROCEDURE — 82043 UR ALBUMIN QUANTITATIVE: CPT

## 2025-06-02 PROCEDURE — 1123F ACP DISCUSS/DSCN MKR DOCD: CPT | Performed by: STUDENT IN AN ORGANIZED HEALTH CARE EDUCATION/TRAINING PROGRAM

## 2025-06-02 PROCEDURE — 1160F RVW MEDS BY RX/DR IN RCRD: CPT | Performed by: STUDENT IN AN ORGANIZED HEALTH CARE EDUCATION/TRAINING PROGRAM

## 2025-06-02 PROCEDURE — 83036 HEMOGLOBIN GLYCOSYLATED A1C: CPT

## 2025-06-02 RX ORDER — LOSARTAN POTASSIUM 100 MG/1
100 TABLET ORAL DAILY
Qty: 90 TABLET | Refills: 1 | Status: SHIPPED | OUTPATIENT
Start: 2025-06-02 | End: 2025-08-31

## 2025-06-02 RX ORDER — OMEPRAZOLE 20 MG/1
20 CAPSULE, DELAYED RELEASE ORAL DAILY
Qty: 90 CAPSULE | Refills: 1 | Status: SHIPPED | OUTPATIENT
Start: 2025-06-02 | End: 2025-08-31

## 2025-06-02 RX ORDER — ATENOLOL 50 MG/1
50 TABLET ORAL DAILY
Qty: 90 TABLET | Refills: 1 | Status: SHIPPED | OUTPATIENT
Start: 2025-06-02 | End: 2026-05-28

## 2025-06-02 RX ORDER — ALLOPURINOL 100 MG/1
100 TABLET ORAL DAILY
Qty: 90 TABLET | Refills: 1 | Status: SHIPPED | OUTPATIENT
Start: 2025-06-02 | End: 2026-05-28

## 2025-06-02 SDOH — ECONOMIC STABILITY: FOOD INSECURITY: WITHIN THE PAST 12 MONTHS, YOU WORRIED THAT YOUR FOOD WOULD RUN OUT BEFORE YOU GOT MONEY TO BUY MORE.: NEVER TRUE

## 2025-06-02 SDOH — ECONOMIC STABILITY: FOOD INSECURITY: WITHIN THE PAST 12 MONTHS, THE FOOD YOU BOUGHT JUST DIDN'T LAST AND YOU DIDN'T HAVE MONEY TO GET MORE.: NEVER TRUE

## 2025-06-02 ASSESSMENT — LIFESTYLE VARIABLES
HOW OFTEN DO YOU HAVE A DRINK CONTAINING ALCOHOL: NEVER
HOW MANY STANDARD DRINKS CONTAINING ALCOHOL DO YOU HAVE ON A TYPICAL DAY: PATIENT DOES NOT DRINK

## 2025-06-02 ASSESSMENT — ENCOUNTER SYMPTOMS
COUGH: 0
VOMITING: 0
DIARRHEA: 0
SHORTNESS OF BREATH: 0
CHEST TIGHTNESS: 0
NAUSEA: 0

## 2025-06-02 ASSESSMENT — PATIENT HEALTH QUESTIONNAIRE - PHQ9
1. LITTLE INTEREST OR PLEASURE IN DOING THINGS: NOT AT ALL
SUM OF ALL RESPONSES TO PHQ QUESTIONS 1-9: 0
2. FEELING DOWN, DEPRESSED OR HOPELESS: NOT AT ALL
SUM OF ALL RESPONSES TO PHQ QUESTIONS 1-9: 0

## 2025-06-02 NOTE — PATIENT INSTRUCTIONS
run in your family, and various assessments and screenings as appropriate.  After reviewing your medical record and screening and assessments performed today your provider may have ordered immunizations, labs, imaging, and/or referrals for you.  A list of these orders (if applicable) as well as your Preventive Care list are included within your After Visit Summary for your review.

## 2025-06-02 NOTE — PROGRESS NOTES
Medicare Annual Wellness Visit    Mich Hernandez is here for Medicare AWV    Assessment & Plan   Medicare annual wellness visit, subsequent  -     CBC; Future  -     Basic Metabolic Panel; Future  Need for prophylactic vaccination and inoculation against varicella  -     zoster recombinant adjuvanted vaccine (SHINGRIX) 50 MCG/0.5ML SUSR injection; Inject 0.5 mLs into the muscle once for 1 dose, Disp-0.5 mL, R-0Print  Screening for endocrine disorder  -     Hemoglobin A1C; Future  Screening for lipid disorders  -     Lipid Panel; Future  Essential hypertension  Comments:  well controlled, meds refilled, fu in 6 mo  Orders:  -     Albumin/Creatinine Ratio, Urine; Future  Seizure (HCC)  Comments:  well controlled  Stage 3a chronic kidney disease (HCC)  Comments:  check labs today  Gastroesophageal reflux disease without esophagitis  -     omeprazole (PRILOSEC) 20 MG delayed release capsule; Take 1 capsule by mouth daily, Disp-90 capsule, R-1Normal  Advance care planning  Comments:  provided info     Return in about 6 months (around 12/2/2025) for HTN.     Subjective       Patient's complete Health Risk Assessment and screening values have been reviewed and are found in Flowsheets. The following problems were reviewed today and where indicated follow up appointments were made and/or referrals ordered.    Positive Risk Factor Screenings with Interventions:    Fall Risk:  Do you feel unsteady or are you worried about falling? : (!) yes  2 or more falls in past year?: no  Fall with injury in past year?: no     Interventions:    Reviewed medications, home hazards, visual acuity, and co-morbidities that can increase risk for falls             Inactivity:  On average, how many days per week do you engage in moderate to strenuous exercise (like a brisk walk)?: 0 days (!) Abnormal  On average, how many minutes do you engage in exercise at this level?: 0 min  Interventions:  See AVS for additional education material

## 2025-06-02 NOTE — PROGRESS NOTES
[facility]    HISTORY OF PRESENT ILLNESS  Mich Hernandez  is a 80 y.o. y.o. male here for MWV and FU.    HYPERTENSION, Essential  Reports Compliance with meds 50, losartan 100, nifedipine 60   Denies Chest pain, shortness of breath, lower extremity edema, vision changes, change in urination.    Lab Results   Component Value Date     2024    K 4.4 2024     2024    CO2 24 2024    GLUCOSE 121 (H) 2024    BUN 30 (H) 2024    CREATININE 1.55 (H) 2024    CALCIUM 9.2 2024    ALT 20 2023    AST 16 2023    ALKPHOS 97 2023     HLD  -simvastatin 40     Seizures  -taking Keppra 250  -last seizure about 30 years  -follows a neurologist, was told could stop it but scared to stop medication     CKD  -not following nephrology  -last GFR 45    Health Maintenance:    Depression Screen:       2025     1:04 PM   PHQ-9    Little interest or pleasure in doing things 0   Feeling down, depressed, or hopeless 0   PHQ-2 Score 0   PHQ-9 Total Score 0        HCV Screen:   Lab Results   Component Value Date    HEPCAB <0.02 2021    HEPCAB Negative 2021        Colon Cancer Screening: n/a  Prostate Cancer Screening:   Lab Results   Component Value Date    PSA 2.8 2022        Smoking Status:   Tobacco Use      Smoking status: Former        Packs/day: 0.00        Years: 1 pack/day for 10.0 years (10.0 ttl pk-yrs)        Types: Cigarettes        Start date: 1973        Quit date: 1983        Years since quittin.4      Smokeless tobacco: Never     Lung Cancer Screening:  CT Low Dose n/a  AAA Screening: n/a    Exercise:  Diet:    Immunizations:  Flu vaccine- Recommended every fall  COVID vaccine primary series- complete  Tetanus- Tdap   Shingrix- series not completed  Pneumovax 23-  N/A  Prevnar 20- at age 63         Mr#: [id number]      Past Medical History:   Diagnosis Date    Arthritis     Cancer (HCC)

## 2025-06-03 ENCOUNTER — RESULTS FOLLOW-UP (OUTPATIENT)
Dept: FAMILY MEDICINE CLINIC | Facility: CLINIC | Age: 80
End: 2025-06-03

## 2025-08-18 DIAGNOSIS — K21.9 GASTROESOPHAGEAL REFLUX DISEASE WITHOUT ESOPHAGITIS: ICD-10-CM

## 2025-08-20 RX ORDER — OMEPRAZOLE 20 MG/1
20 CAPSULE, DELAYED RELEASE ORAL DAILY
Qty: 90 CAPSULE | Refills: 1 | Status: SHIPPED | OUTPATIENT
Start: 2025-08-20 | End: 2025-11-18

## 2025-08-20 RX ORDER — LOSARTAN POTASSIUM 100 MG/1
100 TABLET ORAL DAILY
Qty: 90 TABLET | Refills: 1 | Status: SHIPPED | OUTPATIENT
Start: 2025-08-20 | End: 2025-11-18